# Patient Record
Sex: FEMALE | Race: WHITE
[De-identification: names, ages, dates, MRNs, and addresses within clinical notes are randomized per-mention and may not be internally consistent; named-entity substitution may affect disease eponyms.]

---

## 2017-05-31 ENCOUNTER — RECORD ABSTRACTING (OUTPATIENT)
Age: 82
End: 2017-05-31

## 2017-05-31 DIAGNOSIS — Z63.4 DISAPPEARANCE AND DEATH OF FAMILY MEMBER: ICD-10-CM

## 2017-05-31 DIAGNOSIS — K21.9 GASTRO-ESOPHAGEAL REFLUX DISEASE W/OUT ESOPHAGITIS: ICD-10-CM

## 2017-05-31 DIAGNOSIS — Z72.0 TOBACCO USE: ICD-10-CM

## 2017-05-31 DIAGNOSIS — Z82.49 FAMILY HISTORY OF ISCHEMIC HEART DISEASE AND OTHER DISEASES OF THE CIRCULATORY SYSTEM: ICD-10-CM

## 2017-05-31 DIAGNOSIS — Z72.3 LACK OF PHYSICAL EXERCISE: ICD-10-CM

## 2017-05-31 DIAGNOSIS — Z82.5 FAMILY HISTORY OF ASTHMA AND OTHER CHRONIC LOWER RESPIRATORY DISEASES: ICD-10-CM

## 2017-05-31 DIAGNOSIS — Z87.891 PERSONAL HISTORY OF NICOTINE DEPENDENCE: ICD-10-CM

## 2017-05-31 SDOH — SOCIAL STABILITY - SOCIAL INSECURITY: DISSAPEARANCE AND DEATH OF FAMILY MEMBER: Z63.4

## 2017-06-01 ENCOUNTER — APPOINTMENT (OUTPATIENT)
Dept: INTERNAL MEDICINE | Facility: CLINIC | Age: 82
End: 2017-06-01

## 2017-06-01 VITALS
OXYGEN SATURATION: 93 % | WEIGHT: 155 LBS | DIASTOLIC BLOOD PRESSURE: 56 MMHG | TEMPERATURE: 97.6 F | RESPIRATION RATE: 16 BRPM | HEIGHT: 58 IN | BODY MASS INDEX: 32.54 KG/M2 | HEART RATE: 66 BPM | SYSTOLIC BLOOD PRESSURE: 108 MMHG

## 2017-06-01 RX ORDER — SIMVASTATIN 20 MG/1
20 TABLET, FILM COATED ORAL
Qty: 90 | Refills: 0 | Status: ACTIVE | COMMUNITY
Start: 2016-04-21

## 2017-06-01 RX ORDER — CARVEDILOL 6.25 MG/1
6.25 TABLET, FILM COATED ORAL
Qty: 180 | Refills: 0 | Status: ACTIVE | COMMUNITY
Start: 2016-04-21

## 2017-06-01 RX ORDER — FAMOTIDINE 40 MG/1
40 TABLET, FILM COATED ORAL
Qty: 90 | Refills: 0 | Status: ACTIVE | COMMUNITY
Start: 2016-10-19

## 2017-12-04 ENCOUNTER — APPOINTMENT (OUTPATIENT)
Dept: INTERNAL MEDICINE | Facility: CLINIC | Age: 82
End: 2017-12-04
Payer: MEDICARE

## 2017-12-04 ENCOUNTER — NON-APPOINTMENT (OUTPATIENT)
Age: 82
End: 2017-12-04

## 2017-12-04 VITALS
SYSTOLIC BLOOD PRESSURE: 120 MMHG | BODY MASS INDEX: 32.53 KG/M2 | RESPIRATION RATE: 16 BRPM | OXYGEN SATURATION: 94 % | TEMPERATURE: 97.7 F | HEIGHT: 58 IN | DIASTOLIC BLOOD PRESSURE: 70 MMHG | WEIGHT: 154.98 LBS | HEART RATE: 78 BPM

## 2017-12-04 DIAGNOSIS — E78.2 MIXED HYPERLIPIDEMIA: ICD-10-CM

## 2017-12-04 PROCEDURE — 94060 EVALUATION OF WHEEZING: CPT

## 2017-12-04 PROCEDURE — 99214 OFFICE O/P EST MOD 30 MIN: CPT | Mod: 25

## 2018-04-09 ENCOUNTER — EMERGENCY (EMERGENCY)
Facility: HOSPITAL | Age: 83
LOS: 0 days | Discharge: HOME | End: 2018-04-09
Attending: EMERGENCY MEDICINE | Admitting: EMERGENCY MEDICINE
Payer: MEDICARE

## 2018-04-09 VITALS
TEMPERATURE: 98 F | OXYGEN SATURATION: 99 % | RESPIRATION RATE: 17 BRPM | DIASTOLIC BLOOD PRESSURE: 76 MMHG | SYSTOLIC BLOOD PRESSURE: 142 MMHG | HEART RATE: 71 BPM

## 2018-04-09 VITALS — WEIGHT: 111.99 LBS | HEIGHT: 61 IN

## 2018-04-09 DIAGNOSIS — I10 ESSENTIAL (PRIMARY) HYPERTENSION: ICD-10-CM

## 2018-04-09 DIAGNOSIS — N39.0 URINARY TRACT INFECTION, SITE NOT SPECIFIED: ICD-10-CM

## 2018-04-09 DIAGNOSIS — R10.32 LEFT LOWER QUADRANT PAIN: ICD-10-CM

## 2018-04-09 DIAGNOSIS — R11.0 NAUSEA: ICD-10-CM

## 2018-04-09 DIAGNOSIS — Z87.19 PERSONAL HISTORY OF OTHER DISEASES OF THE DIGESTIVE SYSTEM: ICD-10-CM

## 2018-04-09 DIAGNOSIS — K59.00 CONSTIPATION, UNSPECIFIED: ICD-10-CM

## 2018-04-09 LAB
ALBUMIN SERPL ELPH-MCNC: 3.4 G/DL — SIGNIFICANT CHANGE UP (ref 3.3–5)
ALP SERPL-CCNC: 55 U/L — SIGNIFICANT CHANGE UP (ref 40–120)
ALT FLD-CCNC: 40 U/L — SIGNIFICANT CHANGE UP (ref 12–78)
ANION GAP SERPL CALC-SCNC: 7 MMOL/L — SIGNIFICANT CHANGE UP (ref 5–17)
APPEARANCE UR: CLEAR — SIGNIFICANT CHANGE UP
AST SERPL-CCNC: 34 U/L — SIGNIFICANT CHANGE UP (ref 15–37)
BACTERIA # UR AUTO: (no result)
BASOPHILS # BLD AUTO: 0.03 K/UL — SIGNIFICANT CHANGE UP (ref 0–0.2)
BASOPHILS NFR BLD AUTO: 0.6 % — SIGNIFICANT CHANGE UP (ref 0–2)
BILIRUB SERPL-MCNC: 0.4 MG/DL — SIGNIFICANT CHANGE UP (ref 0.2–1.2)
BILIRUB UR-MCNC: NEGATIVE — SIGNIFICANT CHANGE UP
BUN SERPL-MCNC: 20 MG/DL — SIGNIFICANT CHANGE UP (ref 7–23)
CALCIUM SERPL-MCNC: 9.2 MG/DL — SIGNIFICANT CHANGE UP (ref 8.5–10.1)
CHLORIDE SERPL-SCNC: 103 MMOL/L — SIGNIFICANT CHANGE UP (ref 96–108)
CO2 SERPL-SCNC: 29 MMOL/L — SIGNIFICANT CHANGE UP (ref 22–31)
COLOR SPEC: YELLOW — SIGNIFICANT CHANGE UP
COMMENT - URINE: SIGNIFICANT CHANGE UP
CREAT SERPL-MCNC: 0.76 MG/DL — SIGNIFICANT CHANGE UP (ref 0.5–1.3)
DIFF PNL FLD: (no result)
EOSINOPHIL # BLD AUTO: 0.17 K/UL — SIGNIFICANT CHANGE UP (ref 0–0.5)
EOSINOPHIL NFR BLD AUTO: 3.2 % — SIGNIFICANT CHANGE UP (ref 0–6)
EPI CELLS # UR: SIGNIFICANT CHANGE UP
GLUCOSE SERPL-MCNC: 91 MG/DL — SIGNIFICANT CHANGE UP (ref 70–99)
GLUCOSE UR QL: NEGATIVE MG/DL — SIGNIFICANT CHANGE UP
HCT VFR BLD CALC: 41.7 % — SIGNIFICANT CHANGE UP (ref 34.5–45)
HGB BLD-MCNC: 14.5 G/DL — SIGNIFICANT CHANGE UP (ref 11.5–15.5)
IMM GRANULOCYTES NFR BLD AUTO: 0.2 % — SIGNIFICANT CHANGE UP (ref 0–1.5)
INR BLD: 1 RATIO — SIGNIFICANT CHANGE UP (ref 0.88–1.16)
KETONES UR-MCNC: (no result)
LEUKOCYTE ESTERASE UR-ACNC: (no result)
LIDOCAIN IGE QN: 127 U/L — SIGNIFICANT CHANGE UP (ref 73–393)
LYMPHOCYTES # BLD AUTO: 1.28 K/UL — SIGNIFICANT CHANGE UP (ref 1–3.3)
LYMPHOCYTES # BLD AUTO: 23.9 % — SIGNIFICANT CHANGE UP (ref 13–44)
MCHC RBC-ENTMCNC: 31.1 PG — SIGNIFICANT CHANGE UP (ref 27–34)
MCHC RBC-ENTMCNC: 34.8 GM/DL — SIGNIFICANT CHANGE UP (ref 32–36)
MCV RBC AUTO: 89.5 FL — SIGNIFICANT CHANGE UP (ref 80–100)
MONOCYTES # BLD AUTO: 0.63 K/UL — SIGNIFICANT CHANGE UP (ref 0–0.9)
MONOCYTES NFR BLD AUTO: 11.8 % — SIGNIFICANT CHANGE UP (ref 2–14)
NEUTROPHILS # BLD AUTO: 3.23 K/UL — SIGNIFICANT CHANGE UP (ref 1.8–7.4)
NEUTROPHILS NFR BLD AUTO: 60.3 % — SIGNIFICANT CHANGE UP (ref 43–77)
NITRITE UR-MCNC: NEGATIVE — SIGNIFICANT CHANGE UP
NRBC # BLD: 0 /100 WBCS — SIGNIFICANT CHANGE UP (ref 0–0)
PH UR: 5 — SIGNIFICANT CHANGE UP (ref 5–8)
PLATELET # BLD AUTO: 210 K/UL — SIGNIFICANT CHANGE UP (ref 150–400)
POTASSIUM SERPL-MCNC: 4.1 MMOL/L — SIGNIFICANT CHANGE UP (ref 3.5–5.3)
POTASSIUM SERPL-SCNC: 4.1 MMOL/L — SIGNIFICANT CHANGE UP (ref 3.5–5.3)
PROT SERPL-MCNC: 7.3 GM/DL — SIGNIFICANT CHANGE UP (ref 6–8.3)
PROT UR-MCNC: 15 MG/DL
PROTHROM AB SERPL-ACNC: 10.8 SEC — SIGNIFICANT CHANGE UP (ref 9.8–12.7)
RBC # BLD: 4.66 M/UL — SIGNIFICANT CHANGE UP (ref 3.8–5.2)
RBC # FLD: 13.1 % — SIGNIFICANT CHANGE UP (ref 10.3–14.5)
RBC CASTS # UR COMP ASSIST: (no result) /HPF (ref 0–4)
SODIUM SERPL-SCNC: 139 MMOL/L — SIGNIFICANT CHANGE UP (ref 135–145)
SP GR SPEC: 1.02 — SIGNIFICANT CHANGE UP (ref 1.01–1.02)
UROBILINOGEN FLD QL: 1 MG/DL
WBC # BLD: 5.35 K/UL — SIGNIFICANT CHANGE UP (ref 3.8–10.5)
WBC # FLD AUTO: 5.35 K/UL — SIGNIFICANT CHANGE UP (ref 3.8–10.5)
WBC UR QL: SIGNIFICANT CHANGE UP

## 2018-04-09 PROCEDURE — 99284 EMERGENCY DEPT VISIT MOD MDM: CPT

## 2018-04-09 PROCEDURE — 74177 CT ABD & PELVIS W/CONTRAST: CPT | Mod: 26

## 2018-04-09 RX ORDER — CEPHALEXIN 500 MG
1 CAPSULE ORAL
Qty: 14 | Refills: 0 | OUTPATIENT
Start: 2018-04-09 | End: 2018-04-15

## 2018-04-09 RX ORDER — CEPHALEXIN 500 MG
500 CAPSULE ORAL ONCE
Qty: 0 | Refills: 0 | Status: COMPLETED | OUTPATIENT
Start: 2018-04-09 | End: 2018-04-09

## 2018-04-09 RX ORDER — SODIUM CHLORIDE 9 MG/ML
1000 INJECTION INTRAMUSCULAR; INTRAVENOUS; SUBCUTANEOUS ONCE
Qty: 0 | Refills: 0 | Status: COMPLETED | OUTPATIENT
Start: 2018-04-09 | End: 2018-04-09

## 2018-04-09 RX ADMIN — SODIUM CHLORIDE 1000 MILLILITER(S): 9 INJECTION INTRAMUSCULAR; INTRAVENOUS; SUBCUTANEOUS at 18:11

## 2018-04-09 RX ADMIN — Medication 500 MILLIGRAM(S): at 19:18

## 2018-04-09 NOTE — ED ADULT NURSE NOTE - OBJECTIVE STATEMENT
Pt reports LLQ abd pain. Denies diarrhea or vomiting, but reports nausea and inability to tolerate food. Pt reports hx of diverticulitis in past. Denies any bloody bm. Denies hematuria or dysuria.

## 2018-04-09 NOTE — ED STATDOCS - OBJECTIVE STATEMENT
87 y/o female with PMHx of diverticulitis, s/p appy, s/p hysterectomy, HTN, unknown cardiac hx presents to the ED c/o LLQ abd pain x5 days. Pain only occurs upon palpitation. +constipation. +nausea. +chills. No fever, vomiting. Last BM was yesterday. Colonoscopy by Dr. Bai few years ago, pt states that "there was blockage." Sent into ED for further workup by PCP Dr. Soto. Cardio/Dr. Mejia.

## 2018-04-09 NOTE — ED ADULT TRIAGE NOTE - CHIEF COMPLAINT QUOTE
abdominal cramping/discomfort x5 days. +constipation. denies n/v/d. referred from primary care physician for further work up.

## 2018-04-09 NOTE — ED STATDOCS - PROGRESS NOTE DETAILS
Patient seen and evaluated, ED attending note and orders reviewed, will continue with patient follow up and care -Rajinder Rivero PA-C Reviewed all lab and CT results with patient.  No acute findings on CT.  Pain likely due to constipation from dehydration as apparent in ketones in urine.  Patient got some IV hydration, there is also some blood a leukocytes in urine, will treat for UTI and she will follow up outpatient -Rajinder Rivero PA-C

## 2018-04-09 NOTE — ED STATDOCS - CARE PLAN
Principal Discharge DX:	Left lower quadrant abdominal pain of unknown etiology  Secondary Diagnosis:	Urinary tract infection

## 2018-06-20 ENCOUNTER — CHART COPY (OUTPATIENT)
Age: 83
End: 2018-06-20

## 2018-06-27 ENCOUNTER — OTHER (OUTPATIENT)
Age: 83
End: 2018-06-27

## 2018-07-12 ENCOUNTER — APPOINTMENT (OUTPATIENT)
Dept: INTERNAL MEDICINE | Facility: CLINIC | Age: 83
End: 2018-07-12
Payer: MEDICARE

## 2018-07-12 ENCOUNTER — NON-APPOINTMENT (OUTPATIENT)
Age: 83
End: 2018-07-12

## 2018-07-12 VITALS
OXYGEN SATURATION: 96 % | TEMPERATURE: 98.3 F | HEART RATE: 72 BPM | WEIGHT: 156 LBS | RESPIRATION RATE: 16 BRPM | HEIGHT: 58 IN | DIASTOLIC BLOOD PRESSURE: 62 MMHG | BODY MASS INDEX: 32.75 KG/M2 | SYSTOLIC BLOOD PRESSURE: 110 MMHG

## 2018-07-12 PROCEDURE — 94060 EVALUATION OF WHEEZING: CPT

## 2018-07-12 PROCEDURE — 99214 OFFICE O/P EST MOD 30 MIN: CPT | Mod: 25

## 2018-07-12 RX ORDER — MONTELUKAST 10 MG/1
10 TABLET, FILM COATED ORAL
Qty: 90 | Refills: 2 | Status: DISCONTINUED | COMMUNITY
Start: 2017-12-04 | End: 2018-07-12

## 2018-07-12 RX ORDER — FLUTICASONE PROPIONATE AND SALMETEROL 50; 250 UG/1; UG/1
250-50 POWDER RESPIRATORY (INHALATION) TWICE DAILY
Qty: 3 | Refills: 3 | Status: DISCONTINUED | COMMUNITY
Start: 2017-12-04 | End: 2018-07-12

## 2018-09-14 ENCOUNTER — EMERGENCY (EMERGENCY)
Facility: HOSPITAL | Age: 83
LOS: 0 days | Discharge: HOME | End: 2018-09-14
Attending: EMERGENCY MEDICINE | Admitting: EMERGENCY MEDICINE
Payer: MEDICARE

## 2018-09-14 VITALS
DIASTOLIC BLOOD PRESSURE: 79 MMHG | TEMPERATURE: 98 F | HEART RATE: 83 BPM | WEIGHT: 169.98 LBS | SYSTOLIC BLOOD PRESSURE: 166 MMHG | RESPIRATION RATE: 18 BRPM | OXYGEN SATURATION: 97 % | HEIGHT: 60 IN

## 2018-09-14 VITALS
SYSTOLIC BLOOD PRESSURE: 133 MMHG | DIASTOLIC BLOOD PRESSURE: 71 MMHG | HEART RATE: 80 BPM | RESPIRATION RATE: 18 BRPM | OXYGEN SATURATION: 95 %
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
## 2018-09-14 DIAGNOSIS — S81.811A LACERATION WITHOUT FOREIGN BODY, RIGHT LOWER LEG, INITIAL ENCOUNTER: ICD-10-CM
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
## 2018-09-14 DIAGNOSIS — I10 ESSENTIAL (PRIMARY) HYPERTENSION: ICD-10-CM

## 2018-09-14 DIAGNOSIS — Z79.899 OTHER LONG TERM (CURRENT) DRUG THERAPY: ICD-10-CM

## 2018-09-14 PROBLEM — K57.92 DIVERTICULITIS OF INTESTINE, PART UNSPECIFIED, WITHOUT PERFORATION OR ABSCESS WITHOUT BLEEDING: Chronic | Status: ACTIVE | Noted: 2018-04-09

## 2018-09-14 PROCEDURE — 99284 EMERGENCY DEPT VISIT MOD MDM: CPT

## 2018-09-14 PROCEDURE — 12004 RPR S/N/AX/GEN/TRK7.6-12.5CM: CPT | Mod: RT

## 2018-09-14 RX ORDER — OXYCODONE HYDROCHLORIDE 5 MG/1
1 TABLET ORAL
Qty: 12 | Refills: 0 | OUTPATIENT
Start: 2018-09-14 | End: 2018-09-16

## 2018-09-14 RX ORDER — TETANUS TOXOID, REDUCED DIPHTHERIA TOXOID AND ACELLULAR PERTUSSIS VACCINE, ADSORBED 5; 2.5; 8; 8; 2.5 [IU]/.5ML; [IU]/.5ML; UG/.5ML; UG/.5ML; UG/.5ML
0.5 SUSPENSION INTRAMUSCULAR ONCE
Qty: 0 | Refills: 0 | Status: COMPLETED | OUTPATIENT
Start: 2018-09-14 | End: 2018-09-14

## 2018-09-14 RX ORDER — CEPHALEXIN 500 MG
1 CAPSULE ORAL
Qty: 21 | Refills: 0 | OUTPATIENT
Start: 2018-09-14 | End: 2018-09-20

## 2018-09-14 RX ORDER — ONDANSETRON 8 MG/1
4 TABLET, FILM COATED ORAL ONCE
Qty: 0 | Refills: 0 | Status: COMPLETED | OUTPATIENT
Start: 2018-09-14 | End: 2018-09-14

## 2018-09-14 RX ORDER — ONDANSETRON 8 MG/1
4 TABLET, FILM COATED ORAL ONCE
Qty: 0 | Refills: 0 | Status: DISCONTINUED | OUTPATIENT
Start: 2018-09-14 | End: 2018-09-14

## 2018-09-14 RX ORDER — OXYCODONE AND ACETAMINOPHEN 5; 325 MG/1; MG/1
2 TABLET ORAL ONCE
Qty: 0 | Refills: 0 | Status: DISCONTINUED | OUTPATIENT
Start: 2018-09-14 | End: 2018-09-14

## 2018-09-14 RX ADMIN — TETANUS TOXOID, REDUCED DIPHTHERIA TOXOID AND ACELLULAR PERTUSSIS VACCINE, ADSORBED 0.5 MILLILITER(S): 5; 2.5; 8; 8; 2.5 SUSPENSION INTRAMUSCULAR at 12:09

## 2018-09-14 RX ADMIN — OXYCODONE AND ACETAMINOPHEN 2 TABLET(S): 5; 325 TABLET ORAL at 12:09

## 2018-09-14 RX ADMIN — ONDANSETRON 4 MILLIGRAM(S): 8 TABLET, FILM COATED ORAL at 12:09

## 2018-09-14 NOTE — ED ADULT NURSE NOTE - NSIMPLEMENTINTERV_GEN_ALL_ED
Implemented All Fall with Harm Risk Interventions:  [place] to call system. Call bell, personal items and telephone within reach. Instruct patient to call for assistance. Room bathroom lighting operational. Non-slip footwear when patient is off stretcher. Physically safe environment: no spills, clutter or unnecessary equipment. Stretcher in lowest position, wheels locked, appropriate side rails in place. Provide visual cue, wrist band, yellow gown, etc. Monitor gait and stability. Monitor for mental status changes and reorient to person, place, and time. Review medications for side effects contributing to fall risk. Reinforce activity limits and safety measures with patient and family. Provide visual clues: red socks.

## 2018-09-14 NOTE — ED PROVIDER NOTE - PROGRESS NOTE DETAILS
Wound cleansed with copious NS , laceration repaired. ice to area x 24 hours; dressing to remain in place x 24 hours; then wound is to be cleaned with soap and water, dried well and rebandaged, after 48 hours patient can get wound wet in the shower, but sutures can never be drenched, they need to be completely dried after getting them wet; keep wound dry and clean, during daily activities try and be mindful that sutures are in place so that you do not reopen wound, wound should be checked in 2 days by your primary care doctor or return to the clinic and suture removal should be done in 8-12 days by your regular doctor or you may return to ED for suture removal; if any significant redness, swelling, purulent drainage, or any severe increase in pain or if fever occurs, please return to ED immediately. if there are any other changes or worsening of symptoms patient is to return to ED. -Radames Mohan PA-C

## 2018-09-14 NOTE — ED PROVIDER NOTE - CARDIAC, MLM
Normal rate, regular rhythm.  Heart sounds S1, S2.  No murmurs, rubs or gallops. +2 dorsalis pedis pulse.

## 2018-09-14 NOTE — ED ADULT TRIAGE NOTE - CHIEF COMPLAINT QUOTE
Patient comes to ED from MD office, pt was trying to get onto exam table, slipped and hit right shin causing laceration. no fall

## 2018-09-14 NOTE — ED PROVIDER NOTE - SKIN, MLM
Skin normal color for race, warm, dry and intact. No evidence of rash. 8 cm V shaped laceration. No active bleeding.

## 2018-09-14 NOTE — ED PROVIDER NOTE - OBJECTIVE STATEMENT
89 y/o female with a PMHx of diverticulitis, HTN presents to the ED c/o lacerations. Pt was at MD office, got up to sit on exam table, slipped, and fell. Pt hit right shin. +laceration on right shin. No other complaints at this time.

## 2018-09-14 NOTE — ED PROVIDER NOTE - ATTENDING CONTRIBUTION TO CARE
Attending Contribution to Care: I, Candi Flores, performed the initial face to face bedside interview with this patient regarding history of present illness, review of symptoms and relevant past medical, social and family history.  I completed an independent physical examination.  I was the initial provider who evaluated this patient and the history, physical, and MDM reflect this intial assessment. I have signed out the follow up of any pending tests after the original (i.e. labs, radiological studies) to the ACP with instructions to review any with instructions to review any concerning findings to me prior to discharge.  I have communicated the patient’s plan of care and disposition with the ACP.

## 2018-09-14 NOTE — ED ADULT NURSE NOTE - OBJECTIVE STATEMENT
Pt states she was at MD office when she banged right shin against exam table - bleeding controlled with dressing prior to arrival. Pt denies any other injuries.

## 2018-11-20 ENCOUNTER — INPATIENT (INPATIENT)
Facility: HOSPITAL | Age: 83
LOS: 4 days | Discharge: HOME | End: 2018-11-25
Attending: FAMILY MEDICINE | Admitting: FAMILY MEDICINE
Payer: MEDICARE

## 2018-11-20 VITALS — HEIGHT: 59 IN | WEIGHT: 149.91 LBS

## 2018-11-20 DIAGNOSIS — R09.89 OTHER SPECIFIED SYMPTOMS AND SIGNS INVOLVING THE CIRCULATORY AND RESPIRATORY SYSTEMS: ICD-10-CM

## 2018-11-20 DIAGNOSIS — Z90.89 ACQUIRED ABSENCE OF OTHER ORGANS: Chronic | ICD-10-CM

## 2018-11-20 DIAGNOSIS — Z90.49 ACQUIRED ABSENCE OF OTHER SPECIFIED PARTS OF DIGESTIVE TRACT: Chronic | ICD-10-CM

## 2018-11-20 LAB
ADD ON TEST-SPECIMEN IN LAB: SIGNIFICANT CHANGE UP
ALBUMIN SERPL ELPH-MCNC: 3.6 G/DL — SIGNIFICANT CHANGE UP (ref 3.3–5)
ALP SERPL-CCNC: 49 U/L — SIGNIFICANT CHANGE UP (ref 40–120)
ALT FLD-CCNC: 54 U/L — SIGNIFICANT CHANGE UP (ref 12–78)
ANION GAP SERPL CALC-SCNC: 7 MMOL/L — SIGNIFICANT CHANGE UP (ref 5–17)
AST SERPL-CCNC: 28 U/L — SIGNIFICANT CHANGE UP (ref 15–37)
BILIRUB SERPL-MCNC: 0.5 MG/DL — SIGNIFICANT CHANGE UP (ref 0.2–1.2)
BUN SERPL-MCNC: 32 MG/DL — HIGH (ref 7–23)
CALCIUM SERPL-MCNC: 8.9 MG/DL — SIGNIFICANT CHANGE UP (ref 8.5–10.1)
CHLORIDE SERPL-SCNC: 105 MMOL/L — SIGNIFICANT CHANGE UP (ref 96–108)
CO2 SERPL-SCNC: 29 MMOL/L — SIGNIFICANT CHANGE UP (ref 22–31)
CREAT SERPL-MCNC: 0.68 MG/DL — SIGNIFICANT CHANGE UP (ref 0.5–1.3)
ERYTHROCYTE [SEDIMENTATION RATE] IN BLOOD: 12 MM/HR — SIGNIFICANT CHANGE UP (ref 0–20)
GLUCOSE SERPL-MCNC: 92 MG/DL — SIGNIFICANT CHANGE UP (ref 70–99)
HCT VFR BLD CALC: 43.5 % — SIGNIFICANT CHANGE UP (ref 34.5–45)
HGB BLD-MCNC: 14.6 G/DL — SIGNIFICANT CHANGE UP (ref 11.5–15.5)
MCHC RBC-ENTMCNC: 31.9 PG — SIGNIFICANT CHANGE UP (ref 27–34)
MCHC RBC-ENTMCNC: 33.6 GM/DL — SIGNIFICANT CHANGE UP (ref 32–36)
MCV RBC AUTO: 95 FL — SIGNIFICANT CHANGE UP (ref 80–100)
NRBC # BLD: 0 /100 WBCS — SIGNIFICANT CHANGE UP (ref 0–0)
PLATELET # BLD AUTO: 221 K/UL — SIGNIFICANT CHANGE UP (ref 150–400)
POTASSIUM SERPL-MCNC: 4.7 MMOL/L — SIGNIFICANT CHANGE UP (ref 3.5–5.3)
POTASSIUM SERPL-SCNC: 4.7 MMOL/L — SIGNIFICANT CHANGE UP (ref 3.5–5.3)
PROT SERPL-MCNC: 7.3 GM/DL — SIGNIFICANT CHANGE UP (ref 6–8.3)
RBC # BLD: 4.58 M/UL — SIGNIFICANT CHANGE UP (ref 3.8–5.2)
RBC # FLD: 13 % — SIGNIFICANT CHANGE UP (ref 10.3–14.5)
SODIUM SERPL-SCNC: 141 MMOL/L — SIGNIFICANT CHANGE UP (ref 135–145)
WBC # BLD: 13.1 K/UL — HIGH (ref 3.8–10.5)
WBC # FLD AUTO: 13.1 K/UL — HIGH (ref 3.8–10.5)

## 2018-11-20 PROCEDURE — 73590 X-RAY EXAM OF LOWER LEG: CPT | Mod: 26,RT

## 2018-11-20 PROCEDURE — 93971 EXTREMITY STUDY: CPT | Mod: 26,RT

## 2018-11-20 PROCEDURE — 93010 ELECTROCARDIOGRAM REPORT: CPT

## 2018-11-20 PROCEDURE — 71045 X-RAY EXAM CHEST 1 VIEW: CPT | Mod: 26

## 2018-11-20 PROCEDURE — 99285 EMERGENCY DEPT VISIT HI MDM: CPT

## 2018-11-20 RX ORDER — PANTOPRAZOLE SODIUM 20 MG/1
40 TABLET, DELAYED RELEASE ORAL
Qty: 0 | Refills: 0 | Status: DISCONTINUED | OUTPATIENT
Start: 2018-11-20 | End: 2018-11-25

## 2018-11-20 RX ORDER — SODIUM CHLORIDE 9 MG/ML
1000 INJECTION INTRAMUSCULAR; INTRAVENOUS; SUBCUTANEOUS
Qty: 0 | Refills: 0 | Status: DISCONTINUED | OUTPATIENT
Start: 2018-11-20 | End: 2018-11-22

## 2018-11-20 RX ORDER — CEFTRIAXONE 500 MG/1
1 INJECTION, POWDER, FOR SOLUTION INTRAMUSCULAR; INTRAVENOUS EVERY 24 HOURS
Qty: 0 | Refills: 0 | Status: DISCONTINUED | OUTPATIENT
Start: 2018-11-21 | End: 2018-11-22

## 2018-11-20 RX ORDER — PYRIDOXINE HCL (VITAMIN B6) 100 MG
100 TABLET ORAL DAILY
Qty: 0 | Refills: 0 | Status: DISCONTINUED | OUTPATIENT
Start: 2018-11-20 | End: 2018-11-25

## 2018-11-20 RX ORDER — ACETAMINOPHEN 500 MG
650 TABLET ORAL EVERY 8 HOURS
Qty: 0 | Refills: 0 | Status: DISCONTINUED | OUTPATIENT
Start: 2018-11-20 | End: 2018-11-25

## 2018-11-20 RX ORDER — ONDANSETRON 8 MG/1
4 TABLET, FILM COATED ORAL EVERY 6 HOURS
Qty: 0 | Refills: 0 | Status: DISCONTINUED | OUTPATIENT
Start: 2018-11-20 | End: 2018-11-25

## 2018-11-20 RX ORDER — CEFTRIAXONE 500 MG/1
1 INJECTION, POWDER, FOR SOLUTION INTRAMUSCULAR; INTRAVENOUS ONCE
Qty: 0 | Refills: 0 | Status: COMPLETED | OUTPATIENT
Start: 2018-11-20 | End: 2018-11-20

## 2018-11-20 RX ORDER — LACTOBACILLUS ACIDOPHILUS 100MM CELL
1 CAPSULE ORAL DAILY
Qty: 0 | Refills: 0 | Status: DISCONTINUED | OUTPATIENT
Start: 2018-11-20 | End: 2018-11-25

## 2018-11-20 RX ORDER — OXYBUTYNIN CHLORIDE 5 MG
5 TABLET ORAL
Qty: 0 | Refills: 0 | Status: DISCONTINUED | OUTPATIENT
Start: 2018-11-20 | End: 2018-11-25

## 2018-11-20 RX ORDER — CARVEDILOL PHOSPHATE 80 MG/1
6.25 CAPSULE, EXTENDED RELEASE ORAL EVERY 12 HOURS
Qty: 0 | Refills: 0 | Status: DISCONTINUED | OUTPATIENT
Start: 2018-11-20 | End: 2018-11-25

## 2018-11-20 RX ORDER — CEFTRIAXONE 500 MG/1
INJECTION, POWDER, FOR SOLUTION INTRAMUSCULAR; INTRAVENOUS
Qty: 0 | Refills: 0 | Status: DISCONTINUED | OUTPATIENT
Start: 2018-11-20 | End: 2018-11-22

## 2018-11-20 RX ORDER — DONEPEZIL HYDROCHLORIDE 10 MG/1
10 TABLET, FILM COATED ORAL DAILY
Qty: 0 | Refills: 0 | Status: DISCONTINUED | OUTPATIENT
Start: 2018-11-20 | End: 2018-11-25

## 2018-11-20 RX ORDER — ENOXAPARIN SODIUM 100 MG/ML
40 INJECTION SUBCUTANEOUS EVERY 24 HOURS
Qty: 0 | Refills: 0 | Status: DISCONTINUED | OUTPATIENT
Start: 2018-11-20 | End: 2018-11-25

## 2018-11-20 RX ORDER — ACETAMINOPHEN 500 MG
1000 TABLET ORAL ONCE
Qty: 0 | Refills: 0 | Status: COMPLETED | OUTPATIENT
Start: 2018-11-20 | End: 2018-11-20

## 2018-11-20 RX ORDER — VANCOMYCIN HCL 1 G
1000 VIAL (EA) INTRAVENOUS ONCE
Qty: 0 | Refills: 0 | Status: COMPLETED | OUTPATIENT
Start: 2018-11-20 | End: 2018-11-20

## 2018-11-20 RX ORDER — SIMVASTATIN 20 MG/1
20 TABLET, FILM COATED ORAL AT BEDTIME
Qty: 0 | Refills: 0 | Status: DISCONTINUED | OUTPATIENT
Start: 2018-11-20 | End: 2018-11-25

## 2018-11-20 RX ORDER — FAMOTIDINE 10 MG/ML
40 INJECTION INTRAVENOUS AT BEDTIME
Qty: 0 | Refills: 0 | Status: DISCONTINUED | OUTPATIENT
Start: 2018-11-20 | End: 2018-11-25

## 2018-11-20 RX ORDER — CHOLECALCIFEROL (VITAMIN D3) 125 MCG
400 CAPSULE ORAL DAILY
Qty: 0 | Refills: 0 | Status: DISCONTINUED | OUTPATIENT
Start: 2018-11-20 | End: 2018-11-25

## 2018-11-20 RX ADMIN — Medication 650 MILLIGRAM(S): at 23:08

## 2018-11-20 RX ADMIN — Medication 1000 MILLIGRAM(S): at 17:30

## 2018-11-20 RX ADMIN — Medication 600 MILLIGRAM(S): at 17:20

## 2018-11-20 RX ADMIN — Medication 1000 MILLIGRAM(S): at 17:00

## 2018-11-20 RX ADMIN — CEFTRIAXONE 100 GRAM(S): 500 INJECTION, POWDER, FOR SOLUTION INTRAMUSCULAR; INTRAVENOUS at 21:00

## 2018-11-20 RX ADMIN — SIMVASTATIN 20 MILLIGRAM(S): 20 TABLET, FILM COATED ORAL at 23:10

## 2018-11-20 RX ADMIN — Medication 100 MILLIGRAM(S): at 16:50

## 2018-11-20 RX ADMIN — FAMOTIDINE 40 MILLIGRAM(S): 10 INJECTION INTRAVENOUS at 23:11

## 2018-11-20 RX ADMIN — ENOXAPARIN SODIUM 40 MILLIGRAM(S): 100 INJECTION SUBCUTANEOUS at 23:11

## 2018-11-20 RX ADMIN — Medication 250 MILLIGRAM(S): at 23:51

## 2018-11-20 NOTE — ED STATDOCS - SKIN, MLM
skin normal color for race, warm, dry and intact. RLE minimally erythematous, blanching, neuro intact. No vesicles. Oozing minimal purulent drainage.

## 2018-11-20 NOTE — H&P ADULT - NSHPPHYSICALEXAM_GEN_ALL_CORE
PHYSICAL EXAM:    Constitutional: NAD, awake and alert, well-developed  HEENT: PERR, EOMI, Normal Hearing, MMM  Neck: Soft and supple, No LAD, No JVD  Respiratory: Breath sounds are clear bilaterally, No wheezing, rales or rhonchi  Cardiovascular: S1 and S2, regular rate and rhythm, no Murmurs, gallops or rubs  Gastrointestinal: Bowel Sounds present, soft, nontender, nondistended, no guarding, no rebound  Extremities: No peripheral edema  Vascular: 2+ peripheral pulses  Neurological: A/O x 3, no focal deficits  Musculoskeletal: 5/5 strength b/l upper and lower extremities  Skin: No rashes, RLE shin erythema with some open wound and yellow discharge  rectal : deferred, not indicated

## 2018-11-20 NOTE — H&P ADULT - ASSESSMENT
89 y/o F with PMHx of CAD, h/o right sided Lung ca s/p RT , HLD, and GERD, CBP, overactive bladder presenting to the  with 1 days history of  lower right leg pain and redness .  Pt was seen in ED in 09/2018 with laceration to right shin s/p slip and fall. 19 stitches were placed which have since been removed. Last night, pt started to experience erythema and pain to site of wound on RLE. Seen by PMD Dr. Cuevas today who advised pt to come into ED for evaluation, pt was taking abx for recently dx UTI.  No falls or other trauma to the leg since original injury in September. Denies any abd pain, nausea, vomiting, but has poor appetite, no fevers, chills, no CP, SOB, numbness, or weakness. Patient reports occasional dry cough and some lung sounds on and off in last couple of weeks.    in ED - T(F): 98, Max: 98.2 HR: 67 ( (67 - 77) BP: 134/67  (134/67 - 147/72) RR: 19 (19 - 19) SpO2: 100%  (100% - 100%)WBC 13, BUN 32, Cr 0.68 EKG - sinus , RBBB, no ischemic changes, CXR - no infiltrates, s/p Clindamycin in ED     * RLE erythema and pain suspected acute cellulitis r/o DVT  - med-surg  - X ray - no fx  - doppler - pending  - IV fluids  - s/p Clinda in ED, c/w IV Ceftriaxone and Vanco  - f/u cultures , check UA, urine cx  - ID consult  - local wound care  - elevation  - trend WBC    * cough h/o lung CA s/p RT  - CXR - clear  - follows with Dr. Giron for serial CT , next schedule for december    * HTN  - c/w coreg    * HLD   - c/w statins    * overactive bladder, recent UTI  - c/w oxybutinin  - f/u UA, urine cx  - ceftriaxone will cover UTI     * Dementia  - c/w aricept    * GERD  - c/w PPI and h2 blocker    Advanced directives  - spend 15 min  - FULL code    DVT proph - lovenox  IMPROVE VTE Individual Risk Assessment    RISK                                                                Points    [  ] Previous VTE                                                  3    [  ] Thrombophilia                                               2    [  ] Lower limb paralysis                                      2        (unable to hold up >15 seconds)      [ x ] Current Cancer                                              2         (within 6 months)    [  ] Immobilization > 24 hrs                                1    [  ] ICU/CCU stay > 24 hours                              1    [ x ] Age > 60                                                      1    IMPROVE VTE Score _____3___    Time spend 72 minutes

## 2018-11-20 NOTE — ED STATDOCS - PROGRESS NOTE DETAILS
[id number]  wound care at [place] MARIAH Messer:   Patient has been seen, evaluated and orders have been written by the attending in intake. Patient is stable.  I will follow up the results of orders written and I will continue to evaluate/observe the patient.  Pt. to be DC with Clindamycin and wound care will contact her at home as per attending.  Line of demarkation drawn to site.  Olga Messer PA-C Dr. Wellington consulted and wants patient admitted cellulitis vs vasculitis.  Pt. agreeable.  Olga Messer PA-C

## 2018-11-20 NOTE — ED STATDOCS - MEDICAL DECISION MAKING DETAILS
89 y/o F with RLE mild cellulitis, no fevers or chills, otherwise feels well, sent in by PMD. Will obtain blood work, give IV Abx, and discuss with plastics or wound care.

## 2018-11-20 NOTE — H&P ADULT - NSHPREVIEWOFSYSTEMS_GEN_ALL_CORE
REVIEW OF SYSTEMS:    CONSTITUTIONAL: No weakness, fevers or chills  EYES/ENT: No visual changes;  No vertigo or throat pain   NECK: No pain or stiffness  RESPIRATORY: No cough, wheezing, hemoptysis; No shortness of breath  CARDIOVASCULAR: No chest pain or palpitations  GASTROINTESTINAL: No abdominal or epigastric pain. No nausea, vomiting, or hematemesis; No diarrhea or constipation. No melena or hematochezia.  GENITOURINARY: No dysuria, frequency or hematuria  NEUROLOGICAL: No numbness or weakness  SKIN: No itching, burning, rashes, right lower leg redness and pain   All other review of systems is negative unless indicated above.

## 2018-11-20 NOTE — ED STATDOCS - OBJECTIVE STATEMENT
89 y/o F with PMHx of CAD, HLD, and GERD presenting to the ED c/o lower right leg pain and erythema starting last night. Pt was seen in ED in 09/2018 with laceration to right shin s/p slip and fall. 19 stitches were placed which have since been removed. Last night, pt started to experience erythema and pain to site of wound on RLE. Seen by PMD Dr. Cuevas today who advised pt to come into ED for evaluation. No falls or other trauma to the leg since original injury in September. Denies any abd pain, N/V/D, fevers, chills, CP, SOB, numbness, or weakness. Pt has been in compliance with an Abx for the past few days to treat UTI. Pt unsure name of Abx but believes it may be Macrobid. Allergic to Lotrel and Sulfa.

## 2018-11-20 NOTE — H&P ADULT - PMH
CAD (coronary artery disease)    Chronic back pain    Diverticulitis    GERD (gastroesophageal reflux disease)    HTN (hypertension)    Lung cancer    Overactive bladder

## 2018-11-20 NOTE — ED STATDOCS - ATTENDING CONTRIBUTION TO CARE
I, Shayy Morales MD, personally saw the patient with ACP.  I have personally performed a face to face diagnostic evaluation on this patient.  I have reviewed the ACP note and agree with the history, exam, and plan of care, except as noted.

## 2018-11-21 LAB
ALBUMIN SERPL ELPH-MCNC: 2.7 G/DL — LOW (ref 3.3–5)
ALP SERPL-CCNC: 39 U/L — LOW (ref 40–120)
ALT FLD-CCNC: 39 U/L — SIGNIFICANT CHANGE UP (ref 12–78)
ANION GAP SERPL CALC-SCNC: 10 MMOL/L — SIGNIFICANT CHANGE UP (ref 5–17)
APPEARANCE UR: CLEAR — SIGNIFICANT CHANGE UP
AST SERPL-CCNC: 23 U/L — SIGNIFICANT CHANGE UP (ref 15–37)
BASOPHILS # BLD AUTO: 0.01 K/UL — SIGNIFICANT CHANGE UP (ref 0–0.2)
BASOPHILS NFR BLD AUTO: 0.1 % — SIGNIFICANT CHANGE UP (ref 0–2)
BILIRUB SERPL-MCNC: 0.5 MG/DL — SIGNIFICANT CHANGE UP (ref 0.2–1.2)
BILIRUB UR-MCNC: NEGATIVE — SIGNIFICANT CHANGE UP
BUN SERPL-MCNC: 26 MG/DL — HIGH (ref 7–23)
CALCIUM SERPL-MCNC: 8 MG/DL — LOW (ref 8.5–10.1)
CHLORIDE SERPL-SCNC: 106 MMOL/L — SIGNIFICANT CHANGE UP (ref 96–108)
CO2 SERPL-SCNC: 25 MMOL/L — SIGNIFICANT CHANGE UP (ref 22–31)
COLOR SPEC: YELLOW — SIGNIFICANT CHANGE UP
CREAT SERPL-MCNC: 0.6 MG/DL — SIGNIFICANT CHANGE UP (ref 0.5–1.3)
CRP SERPL-MCNC: <0.1 MG/DL — SIGNIFICANT CHANGE UP (ref 0–0.4)
DIFF PNL FLD: NEGATIVE — SIGNIFICANT CHANGE UP
EOSINOPHIL # BLD AUTO: 0.03 K/UL — SIGNIFICANT CHANGE UP (ref 0–0.5)
EOSINOPHIL NFR BLD AUTO: 0.3 % — SIGNIFICANT CHANGE UP (ref 0–6)
GLUCOSE SERPL-MCNC: 97 MG/DL — SIGNIFICANT CHANGE UP (ref 70–99)
GLUCOSE UR QL: NEGATIVE MG/DL — SIGNIFICANT CHANGE UP
HCT VFR BLD CALC: 36.8 % — SIGNIFICANT CHANGE UP (ref 34.5–45)
HGB BLD-MCNC: 12.4 G/DL — SIGNIFICANT CHANGE UP (ref 11.5–15.5)
IMM GRANULOCYTES NFR BLD AUTO: 0.3 % — SIGNIFICANT CHANGE UP (ref 0–1.5)
KETONES UR-MCNC: NEGATIVE — SIGNIFICANT CHANGE UP
LEUKOCYTE ESTERASE UR-ACNC: NEGATIVE — SIGNIFICANT CHANGE UP
LYMPHOCYTES # BLD AUTO: 1.74 K/UL — SIGNIFICANT CHANGE UP (ref 1–3.3)
LYMPHOCYTES # BLD AUTO: 17.3 % — SIGNIFICANT CHANGE UP (ref 13–44)
MAGNESIUM SERPL-MCNC: 2 MG/DL — SIGNIFICANT CHANGE UP (ref 1.6–2.6)
MCHC RBC-ENTMCNC: 31.3 PG — SIGNIFICANT CHANGE UP (ref 27–34)
MCHC RBC-ENTMCNC: 33.7 GM/DL — SIGNIFICANT CHANGE UP (ref 32–36)
MCV RBC AUTO: 92.9 FL — SIGNIFICANT CHANGE UP (ref 80–100)
MONOCYTES # BLD AUTO: 0.86 K/UL — SIGNIFICANT CHANGE UP (ref 0–0.9)
MONOCYTES NFR BLD AUTO: 8.5 % — SIGNIFICANT CHANGE UP (ref 2–14)
NEUTROPHILS # BLD AUTO: 7.39 K/UL — SIGNIFICANT CHANGE UP (ref 1.8–7.4)
NEUTROPHILS NFR BLD AUTO: 73.5 % — SIGNIFICANT CHANGE UP (ref 43–77)
NITRITE UR-MCNC: NEGATIVE — SIGNIFICANT CHANGE UP
NRBC # BLD: 0 /100 WBCS — SIGNIFICANT CHANGE UP (ref 0–0)
PH UR: 5 — SIGNIFICANT CHANGE UP (ref 5–8)
PHOSPHATE SERPL-MCNC: 3.1 MG/DL — SIGNIFICANT CHANGE UP (ref 2.5–4.5)
PLATELET # BLD AUTO: 179 K/UL — SIGNIFICANT CHANGE UP (ref 150–400)
POTASSIUM SERPL-MCNC: 4 MMOL/L — SIGNIFICANT CHANGE UP (ref 3.5–5.3)
POTASSIUM SERPL-SCNC: 4 MMOL/L — SIGNIFICANT CHANGE UP (ref 3.5–5.3)
PROT SERPL-MCNC: 5.9 GM/DL — LOW (ref 6–8.3)
PROT UR-MCNC: NEGATIVE MG/DL — SIGNIFICANT CHANGE UP
RBC # BLD: 3.96 M/UL — SIGNIFICANT CHANGE UP (ref 3.8–5.2)
RBC # FLD: 13 % — SIGNIFICANT CHANGE UP (ref 10.3–14.5)
SODIUM SERPL-SCNC: 141 MMOL/L — SIGNIFICANT CHANGE UP (ref 135–145)
SP GR SPEC: 1.02 — SIGNIFICANT CHANGE UP (ref 1.01–1.02)
TSH SERPL-MCNC: 1.4 UU/ML — SIGNIFICANT CHANGE UP (ref 0.34–4.82)
UROBILINOGEN FLD QL: NEGATIVE MG/DL — SIGNIFICANT CHANGE UP
WBC # BLD: 10.06 K/UL — SIGNIFICANT CHANGE UP (ref 3.8–10.5)
WBC # FLD AUTO: 10.06 K/UL — SIGNIFICANT CHANGE UP (ref 3.8–10.5)

## 2018-11-21 PROCEDURE — 73718 MRI LOWER EXTREMITY W/O DYE: CPT | Mod: 26,LT

## 2018-11-21 RX ORDER — ACETAMINOPHEN 500 MG
650 TABLET ORAL EVERY 6 HOURS
Qty: 0 | Refills: 0 | Status: DISCONTINUED | OUTPATIENT
Start: 2018-11-21 | End: 2018-11-25

## 2018-11-21 RX ORDER — METHENAMINE MANDELATE 1 G
1 TABLET ORAL
Qty: 0 | Refills: 0 | COMMUNITY

## 2018-11-21 RX ORDER — LANOLIN ALCOHOL/MO/W.PET/CERES
3 CREAM (GRAM) TOPICAL ONCE
Qty: 0 | Refills: 0 | Status: COMPLETED | OUTPATIENT
Start: 2018-11-21 | End: 2018-11-21

## 2018-11-21 RX ORDER — VANCOMYCIN HCL 1 G
750 VIAL (EA) INTRAVENOUS EVERY 12 HOURS
Qty: 0 | Refills: 0 | Status: DISCONTINUED | OUTPATIENT
Start: 2018-11-21 | End: 2018-11-25

## 2018-11-21 RX ADMIN — CARVEDILOL PHOSPHATE 6.25 MILLIGRAM(S): 80 CAPSULE, EXTENDED RELEASE ORAL at 18:27

## 2018-11-21 RX ADMIN — SIMVASTATIN 20 MILLIGRAM(S): 20 TABLET, FILM COATED ORAL at 23:24

## 2018-11-21 RX ADMIN — Medication 400 UNIT(S): at 12:14

## 2018-11-21 RX ADMIN — Medication 650 MILLIGRAM(S): at 23:55

## 2018-11-21 RX ADMIN — Medication 650 MILLIGRAM(S): at 23:25

## 2018-11-21 RX ADMIN — Medication 5 MILLIGRAM(S): at 06:44

## 2018-11-21 RX ADMIN — Medication 650 MILLIGRAM(S): at 06:47

## 2018-11-21 RX ADMIN — CARVEDILOL PHOSPHATE 6.25 MILLIGRAM(S): 80 CAPSULE, EXTENDED RELEASE ORAL at 06:44

## 2018-11-21 RX ADMIN — Medication 100 MILLIGRAM(S): at 12:18

## 2018-11-21 RX ADMIN — Medication 5 MILLIGRAM(S): at 18:27

## 2018-11-21 RX ADMIN — Medication 650 MILLIGRAM(S): at 00:35

## 2018-11-21 RX ADMIN — ENOXAPARIN SODIUM 40 MILLIGRAM(S): 100 INJECTION SUBCUTANEOUS at 23:26

## 2018-11-21 RX ADMIN — Medication 3 MILLIGRAM(S): at 23:24

## 2018-11-21 RX ADMIN — Medication 1 TABLET(S): at 12:14

## 2018-11-21 RX ADMIN — Medication 650 MILLIGRAM(S): at 12:13

## 2018-11-21 RX ADMIN — PANTOPRAZOLE SODIUM 40 MILLIGRAM(S): 20 TABLET, DELAYED RELEASE ORAL at 06:44

## 2018-11-21 RX ADMIN — DONEPEZIL HYDROCHLORIDE 10 MILLIGRAM(S): 10 TABLET, FILM COATED ORAL at 12:14

## 2018-11-21 RX ADMIN — SODIUM CHLORIDE 50 MILLILITER(S): 9 INJECTION INTRAMUSCULAR; INTRAVENOUS; SUBCUTANEOUS at 01:32

## 2018-11-21 RX ADMIN — Medication 250 MILLIGRAM(S): at 18:26

## 2018-11-21 RX ADMIN — FAMOTIDINE 40 MILLIGRAM(S): 10 INJECTION INTRAVENOUS at 23:24

## 2018-11-21 NOTE — PROGRESS NOTE ADULT - ASSESSMENT
88 y/o F with PMHx of CAD, h/o right sided Lung ca s/p RT , HLD, and GERD, CBP, overactive bladder presenting to the  with 1 days history of  lower right leg pain and redness .  Pt was seen in ED in 09/2018 with laceration to right shin s/p slip and fall. 19 stitches were placed which have since been removed. Last night, pt started to experience erythema and pain to site of wound on RLE. Seen by PMD Dr. Cuevas today who advised pt to come into ED for evaluation, pt was taking abx for recently dx UTI.  No falls or other trauma to the leg since original injury in September. Denies any abd pain, nausea, vomiting, but has poor appetite, no fevers, chills, no CP, SOB, numbness, or weakness. Patient reports occasional dry cough and some lung sounds on and off in last couple of weeks.    in ED - T(F): 98, Max: 98.2 HR: 67 ( (67 - 77) BP: 134/67  (134/67 - 147/72) RR: 19 (19 - 19) SpO2: 100%  (100% - 100%)WBC 13, BUN 32, Cr 0.68 EKG - sinus , RBBB, no ischemic changes, CXR - no infiltrates, s/p Clindamycin in ED     * RLE erythema and pain suspected acute cellulitis r/o DVT  - X ray - no fx  - doppler - neg for DVT  - s/p Clinda in ED, c/w IV Ceftriaxone and Vanco per ID.   - f/u cultures , check UA, urine cx  - ID consult appreciated: MRI ordered r/o OM  - local wound care  - elevation  - trend WBC    * cough h/o lung CA s/p RT  - CXR - clear  - follows with Dr. Giron for serial CT , next schedule for december    * HTN  - c/w coreg    * HLD   - c/w statins    * overactive bladder, recent UTI  - c/w oxybutinin  - f/u UA, urine cx, completed 5/7 days of Macrobid.   - ceftriaxone will cover UTI     * Dementia  - c/w aricept    * GERD  - c/w PPI and h2 blocker    Advanced directives  - FULL code    DVT proph - lovenox  Dispo: remain inpatient on IV abx.   Discussed on IDR.   Total time > 40 mins.

## 2018-11-21 NOTE — CONSULT NOTE ADULT - ASSESSMENT
88 y/o Female with h/o CAD, Lung Ca s/p RT, HLD, GERD, overactive bladder was admitted on 11/20 for lower right leg pain and redness x one day at a site of prior leg laceration.  Pt was seen in ED in 09/2018 with laceration to right shin s/p slip and fall and had 19 stitches placed which have since been removed. The night PTA, pt developed erythema and pain to site of prior RLE wound. Seen by PMD Dr. Cuevas and advised pt to come into ED for evaluation.  No falls or other trauma to the leg since original injury in September. In ER, she received clindamycin.    1. 89 y/o Female with h/o CAD, Lung Ca s/p RT, HLD, GERD, overactive bladder was admitted on 11/20 for lower right leg pain and redness x one day at a site of prior leg laceration.  Pt was seen in ED in 09/2018 with laceration to right shin s/p slip and fall and had 19 stitches placed which have since been removed. The right shin wound never healed completely; she had a persistent small opening on anterior shin area. The night PTA, pt developed erythema and pain to site of her RLE wound. Seen by PMD Dr. Cuevas and advised pt to come into ED for evaluation. In ER, she received clindamycin.    1. Right anterior shin cellulitis with open ulcer. 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  -concerned that her leg wound did not completely heal after her traumatic event more than 2 month ago  -obtain wound c/s  -start vancomycin 750 mg IV q12h and ceftriaxone 1 gm IV qd  -reason for abx use and side effects reviewed with patient; monitor BMP and vancomycin trough levels   -obtain MRI right lower leg with and without contrast  -local wound care  -old chart reviewed to assess prior cultures  -monitor temps  -f/u CBC  -supportive care  2. Other issues:   -care per medicine

## 2018-11-21 NOTE — CONSULT NOTE ADULT - SUBJECTIVE AND OBJECTIVE BOX
Patient is a 89y old  Female who presents with a chief complaint of right leg pain redness and swelling (2018 20:31)    HPI:  88 y/o Female with h/o CAD, Lung Ca s/p RT, HLD, GERD, overactive bladder was admitted on  for lower right leg pain and redness x one day at a site of prior leg laceration.  Pt was seen in ED in 2018 with laceration to right shin s/p slip and fall and had 19 stitches placed which have since been removed. The night PTA, pt developed erythema and pain to site of prior RLE wound. Seen by PMD Dr. Cuevas and advised pt to come into ED for evaluation.  No falls or other trauma to the leg since original injury in September. In ER, she received clindamycin.      PMH: as above  PSH: as above  Meds: per reconciliation sheet, noted below  MEDICATIONS  (STANDING):  acetaminophen   Tablet .. 650 milliGRAM(s) Oral every 8 hours  carvedilol 6.25 milliGRAM(s) Oral every 12 hours  cefTRIAXone   IVPB 1 Gram(s) IV Intermittent every 24 hours  cefTRIAXone   IVPB      cholecalciferol 400 Unit(s) Oral daily  donepezil 10 milliGRAM(s) Oral daily  enoxaparin Injectable 40 milliGRAM(s) SubCutaneous every 24 hours  famotidine    Tablet 40 milliGRAM(s) Oral at bedtime  lactobacillus acidophilus 1 Tablet(s) Oral daily  oxybutynin 5 milliGRAM(s) Oral two times a day  pantoprazole    Tablet 40 milliGRAM(s) Oral before breakfast  pyridoxine 100 milliGRAM(s) Oral daily  simvastatin 20 milliGRAM(s) Oral at bedtime  sodium chloride 0.9%. 1000 milliLiter(s) (50 mL/Hr) IV Continuous <Continuous>    MEDICATIONS  (PRN):  acetaminophen   Tablet .. 650 milliGRAM(s) Oral every 6 hours PRN Temp greater or equal to 38C (100.4F), Moderate Pain (4 - 6)  aluminum hydroxide/magnesium hydroxide/simethicone Suspension 30 milliLiter(s) Oral every 4 hours PRN Dyspepsia  ondansetron Injectable 4 milliGRAM(s) IV Push every 6 hours PRN Nausea    Allergies    Lotrel (Unknown)  sulfa drugs (Unknown)    Intolerances      Social: no smoking, no alcohol, no illegal drugs; no recent travel, no exposure to TB  FAMILY HISTORY:  Family history of cancer in father (Father)    no history of premature cardiovascular disease in first degree relatives    ROS: the patient denies fever, no chills, no HA, no seizures, no dizziness, no sore throat, no nasal congestion, no blurry vision, no CP, no palpitations, no SOB, no cough, no abdominal pain, no diarrhea, no N/V, no dysuria, no leg pain, no claudication, no rash, no joint aches, no rectal pain or bleeding, no night sweats  All other systems reviewed and are negative    Vital Signs Last 24 Hrs  T(C): 36.6 (2018 11:15), Max: 36.8 (2018 15:39)  T(F): 97.9 (2018 11:15), Max: 98.2 (2018 15:39)  HR: 83 (2018 11:15) (67 - 83)  BP: 137/48 (2018 11:15) (133/56 - 147/72)  BP(mean): --  RR: 17 (2018 11:15) (17 - 19)  SpO2: 97% (2018 11:15) (97% - 100%)  Daily Height in cm: 149.86 (2018 15:25)    Daily Weight in k.2 (2018 22:19)    PE:    Constitutional: frail looking  HEENT: NC/AT, EOMI, PERRLA, conjunctivae clear; ears and nose atraumatic; pharynx benign  Neck: supple; thyroid not palpable  Back: no tenderness  Respiratory: respiratory effort normal; clear to auscultation  Cardiovascular: S1S2 regular, no murmurs  Abdomen: soft, not tender, not distended, positive BS; no liver or spleen organomegaly  Genitourinary: no suprapubic tenderness  Lymphatic: no LN palpable  Musculoskeletal: no muscle tenderness, no joint swelling or tenderness  Extremities: no pedal edema  Neurological/ Psychiatric: AxOx3, judgement and insight normal; moving all extremities  Skin: no rashes; no palpable lesions    Labs: all available labs reviewed                        12.4   10.06 )-----------( 179      ( 2018 07:24 )             36.8     11-    141  |  106  |  26<H>  ----------------------------<  97  4.0   |  25  |  0.60    Ca    8.0<L>      2018 07:24  Phos  3.1       Mg     2.0         TPro  5.9<L>  /  Alb  2.7<L>  /  TBili  0.5  /  DBili  x   /  AST  23  /  ALT  39  /  AlkPhos  39<L>       LIVER FUNCTIONS - ( 2018 07:24 )  Alb: 2.7 g/dL / Pro: 5.9 gm/dL / ALK PHOS: 39 U/L / ALT: 39 U/L / AST: 23 U/L / GGT: x           Urinalysis Basic - ( 2018 07:00 )    Color: Yellow / Appearance: Clear / S.020 / pH: x  Gluc: x / Ketone: Negative  / Bili: Negative / Urobili: Negative mg/dL   Blood: x / Protein: Negative mg/dL / Nitrite: Negative   Leuk Esterase: Negative / RBC: x / WBC x   Sq Epi: x / Non Sq Epi: x / Bacteria: x          Radiology: all available radiological tests reviewed    Advanced directives addressed: full resuscitation Patient is a 89y old  Female who presents with a chief complaint of right leg pain redness and swelling (2018 20:31)    HPI:  89 y/o Female with h/o CAD, Lung Ca s/p RT, HLD, GERD, overactive bladder was admitted on  for lower right leg pain and redness x one day at a site of prior leg laceration.  Pt was seen in ED in 2018 with laceration to right shin s/p slip and fall and had 19 stitches placed which have since been removed. The right shin wound never healed completely; she had a persistent small opening on anterior shin area. The night PTA, pt developed erythema and pain to site of her RLE wound. Seen by PMD Dr. Cuevas and advised pt to come into ED for evaluation. In ER, she received clindamycin.      PMH: as above  PSH: as above  Meds: per reconciliation sheet, noted below  MEDICATIONS  (STANDING):  acetaminophen   Tablet .. 650 milliGRAM(s) Oral every 8 hours  carvedilol 6.25 milliGRAM(s) Oral every 12 hours  cefTRIAXone   IVPB 1 Gram(s) IV Intermittent every 24 hours  cefTRIAXone   IVPB      cholecalciferol 400 Unit(s) Oral daily  donepezil 10 milliGRAM(s) Oral daily  enoxaparin Injectable 40 milliGRAM(s) SubCutaneous every 24 hours  famotidine    Tablet 40 milliGRAM(s) Oral at bedtime  lactobacillus acidophilus 1 Tablet(s) Oral daily  oxybutynin 5 milliGRAM(s) Oral two times a day  pantoprazole    Tablet 40 milliGRAM(s) Oral before breakfast  pyridoxine 100 milliGRAM(s) Oral daily  simvastatin 20 milliGRAM(s) Oral at bedtime  sodium chloride 0.9%. 1000 milliLiter(s) (50 mL/Hr) IV Continuous <Continuous>    MEDICATIONS  (PRN):  acetaminophen   Tablet .. 650 milliGRAM(s) Oral every 6 hours PRN Temp greater or equal to 38C (100.4F), Moderate Pain (4 - 6)  aluminum hydroxide/magnesium hydroxide/simethicone Suspension 30 milliLiter(s) Oral every 4 hours PRN Dyspepsia  ondansetron Injectable 4 milliGRAM(s) IV Push every 6 hours PRN Nausea    Allergies    Lotrel (Unknown)  sulfa drugs (Unknown)    Intolerances      Social: no smoking, no alcohol, no illegal drugs; no recent travel, no exposure to TB  FAMILY HISTORY:  Family history of cancer in father (Father)    no history of premature cardiovascular disease in first degree relatives    ROS: the patient denies fever, no chills, no HA, no seizures, no dizziness, no sore throat, no nasal congestion, no blurry vision, no CP, no palpitations, no SOB, no cough, no abdominal pain, no diarrhea, no N/V, no dysuria, no leg pain, no claudication, has RLE rash, no joint aches, no rectal pain or bleeding, no night sweats  All other systems reviewed and are negative    Vital Signs Last 24 Hrs  T(C): 36.6 (2018 11:15), Max: 36.8 (2018 15:39)  T(F): 97.9 (2018 11:15), Max: 98.2 (2018 15:39)  HR: 83 (2018 11:15) (67 - 83)  BP: 137/48 (2018 11:15) (133/56 - 147/72)  BP(mean): --  RR: 17 (2018 11:15) (17 - 19)  SpO2: 97% (2018 11:15) (97% - 100%)  Daily Height in cm: 149.86 (2018 15:25)    Daily Weight in k.2 (2018 22:19)    PE:    Constitutional: frail looking  HEENT: NC/AT, EOMI, PERRLA, conjunctivae clear; ears and nose atraumatic; pharynx benign  Neck: supple; thyroid not palpable  Back: no tenderness  Respiratory: respiratory effort normal; clear to auscultation  Cardiovascular: S1S2 regular, no murmurs  Abdomen: soft, not tender, not distended, positive BS; no liver or spleen organomegaly  Genitourinary: no suprapubic tenderness  Lymphatic: no LN palpable  Musculoskeletal: no muscle tenderness, no joint swelling or tenderness  Right shin small open ulcer with scant serous discharge; surrounding erythema and edema  Extremities: no pedal edema  Neurological/ Psychiatric: AxOx3, judgement and insight normal; moving all extremities  Skin: no rashes; no palpable lesions    Labs: all available labs reviewed                        12.4   10.06 )-----------( 179      ( 2018 07:24 )             36.8     -    141  |  106  |  26<H>  ----------------------------<  97  4.0   |  25  |  0.60    Ca    8.0<L>      2018 07:24  Phos  3.1       Mg     2.0         TPro  5.9<L>  /  Alb  2.7<L>  /  TBili  0.5  /  DBili  x   /  AST  23  /  ALT  39  /  AlkPhos  39<L>       LIVER FUNCTIONS - ( 2018 07:24 )  Alb: 2.7 g/dL / Pro: 5.9 gm/dL / ALK PHOS: 39 U/L / ALT: 39 U/L / AST: 23 U/L / GGT: x           Urinalysis Basic - ( 2018 07:00 )    Color: Yellow / Appearance: Clear / S.020 / pH: x  Gluc: x / Ketone: Negative  / Bili: Negative / Urobili: Negative mg/dL   Blood: x / Protein: Negative mg/dL / Nitrite: Negative   Leuk Esterase: Negative / RBC: x / WBC x   Sq Epi: x / Non Sq Epi: x / Bacteria: x          Radiology: all available radiological tests reviewed    Advanced directives addressed: full resuscitation

## 2018-11-21 NOTE — PROGRESS NOTE ADULT - SUBJECTIVE AND OBJECTIVE BOX
cc; right leg infection    HPI: 88 y/o F with PMHx of CAD, h/o right sided Lung ca s/p RT, HLD, and GERD, CBP, overactive bladder presenting to the  with 1 days history of  lower right leg pain and redness .  Pt was seen in ED in 09/2018 with laceration to right shin s/p slip and fall. 19 stitches were placed which have since been removed. Last night, pt started to experience erythema and pain to site of wound on RLE. Seen by PMD Dr. Cuevas today who advised pt to come into ED for evaluation, pt was taking abx for recently dx UTI.  No falls or other trauma to the leg since original injury in September. Denies any abd pain, nausea, vomiting, but has poor appetite, no fevers, chills, no CP, SOB, numbness, or weakness. Patient reports occasional dry cough and some lung sounds on and off in last couple of weeks.    in ED - T(F): 98, Max: 98.2 HR: 67 ( (67 - 77) BP: 134/67  (134/67 - 147/72) RR: 19 (19 - 19) SpO2: 100%  (100% - 100%)WBC 13, BUN 32, Cr 0.68 EKG - sinus , RBBB, no ischemic changes, CXR - no infiltrates, s/p Clindamycin in ED     11/21: No fever or chills, seen with son at bedside, discussed case. Foot pain decreasing. No CP.    ROS; neg unless stated above.     Vital Signs Last 24 Hrs  T(C): 36.6 (21 Nov 2018 11:15), Max: 36.8 (20 Nov 2018 15:39)  T(F): 97.9 (21 Nov 2018 11:15), Max: 98.2 (20 Nov 2018 15:39)  HR: 83 (21 Nov 2018 11:15) (67 - 83)  BP: 137/48 (21 Nov 2018 11:15) (133/56 - 147/72)  BP(mean): --  RR: 17 (21 Nov 2018 11:15) (17 - 19)  SpO2: 97% (21 Nov 2018 11:15) (97% - 100%)    PHYSICAL EXAM:  Constitutional: NAD, awake and alert, well-developed elderly female  HEENT: PERR, EOMI, Normal Hearing, MMM  Neck: Soft and supple, No LAD, No JVD  Respiratory: Breath sounds are clear bilaterally, No wheezing, rales or rhonchi  Cardiovascular: S1 and S2, regular rate and rhythm, no Murmurs, gallops or rubs  Gastrointestinal: Bowel Sounds present, soft, nontender, nondistended, no guarding, no rebound  Extremities: No peripheral edema except right leg with small open ulcer and surrounding edema and erythema.   Vascular: 2+ peripheral pulses  Neurological: A/O x 3, no focal deficits  Musculoskeletal: 5/5 strength b/l upper and lower extremities  Skin: No rashes    MEDICATIONS  (STANDING):  acetaminophen   Tablet .. 650 milliGRAM(s) Oral every 8 hours  carvedilol 6.25 milliGRAM(s) Oral every 12 hours  cefTRIAXone   IVPB 1 Gram(s) IV Intermittent every 24 hours  cefTRIAXone   IVPB      cholecalciferol 400 Unit(s) Oral daily  donepezil 10 milliGRAM(s) Oral daily  enoxaparin Injectable 40 milliGRAM(s) SubCutaneous every 24 hours  famotidine    Tablet 40 milliGRAM(s) Oral at bedtime  lactobacillus acidophilus 1 Tablet(s) Oral daily  oxybutynin 5 milliGRAM(s) Oral two times a day  pantoprazole    Tablet 40 milliGRAM(s) Oral before breakfast  pyridoxine 100 milliGRAM(s) Oral daily  simvastatin 20 milliGRAM(s) Oral at bedtime  sodium chloride 0.9%. 1000 milliLiter(s) (50 mL/Hr) IV Continuous <Continuous>  vancomycin  IVPB 750 milliGRAM(s) IV Intermittent every 12 hours    LABS: All Labs Reviewed:                        12.4   10.06 )-----------( 179      ( 21 Nov 2018 07:24 )             36.8     11-21    141  |  106  |  26<H>  ----------------------------<  97  4.0   |  25  |  0.60    Ca    8.0<L>      21 Nov 2018 07:24  Phos  3.1     11-21  Mg     2.0     11-21    TPro  5.9<L>  /  Alb  2.7<L>  /  TBili  0.5  /  DBili  x   /  AST  23  /  ALT  39  /  AlkPhos  39<L>  11-21    Blood Culture: pending  Xray of Tib/Fib negative for fracture  Doppler US Right foot: neg for DVT

## 2018-11-22 LAB
CULTURE RESULTS: NO GROWTH — SIGNIFICANT CHANGE UP
SPECIMEN SOURCE: SIGNIFICANT CHANGE UP

## 2018-11-22 RX ORDER — LANOLIN ALCOHOL/MO/W.PET/CERES
3 CREAM (GRAM) TOPICAL ONCE
Qty: 0 | Refills: 0 | Status: COMPLETED | OUTPATIENT
Start: 2018-11-22 | End: 2018-11-22

## 2018-11-22 RX ORDER — CEFTRIAXONE 500 MG/1
1000 INJECTION, POWDER, FOR SOLUTION INTRAMUSCULAR; INTRAVENOUS EVERY 24 HOURS
Qty: 0 | Refills: 0 | Status: DISCONTINUED | OUTPATIENT
Start: 2018-11-22 | End: 2018-11-24

## 2018-11-22 RX ADMIN — Medication 650 MILLIGRAM(S): at 06:22

## 2018-11-22 RX ADMIN — CEFTRIAXONE 1000 MILLIGRAM(S): 500 INJECTION, POWDER, FOR SOLUTION INTRAMUSCULAR; INTRAVENOUS at 00:49

## 2018-11-22 RX ADMIN — Medication 650 MILLIGRAM(S): at 23:58

## 2018-11-22 RX ADMIN — SIMVASTATIN 20 MILLIGRAM(S): 20 TABLET, FILM COATED ORAL at 23:57

## 2018-11-22 RX ADMIN — CARVEDILOL PHOSPHATE 6.25 MILLIGRAM(S): 80 CAPSULE, EXTENDED RELEASE ORAL at 18:27

## 2018-11-22 RX ADMIN — Medication 250 MILLIGRAM(S): at 18:27

## 2018-11-22 RX ADMIN — Medication 5 MILLIGRAM(S): at 18:27

## 2018-11-22 RX ADMIN — Medication 650 MILLIGRAM(S): at 05:52

## 2018-11-22 RX ADMIN — CARVEDILOL PHOSPHATE 6.25 MILLIGRAM(S): 80 CAPSULE, EXTENDED RELEASE ORAL at 05:53

## 2018-11-22 RX ADMIN — Medication 1 TABLET(S): at 12:39

## 2018-11-22 RX ADMIN — Medication 250 MILLIGRAM(S): at 05:52

## 2018-11-22 RX ADMIN — FAMOTIDINE 40 MILLIGRAM(S): 10 INJECTION INTRAVENOUS at 23:58

## 2018-11-22 RX ADMIN — Medication 400 UNIT(S): at 12:37

## 2018-11-22 RX ADMIN — DONEPEZIL HYDROCHLORIDE 10 MILLIGRAM(S): 10 TABLET, FILM COATED ORAL at 12:36

## 2018-11-22 RX ADMIN — Medication 100 MILLIGRAM(S): at 12:37

## 2018-11-22 RX ADMIN — ENOXAPARIN SODIUM 40 MILLIGRAM(S): 100 INJECTION SUBCUTANEOUS at 23:59

## 2018-11-22 RX ADMIN — Medication 3 MILLIGRAM(S): at 23:58

## 2018-11-22 RX ADMIN — Medication 5 MILLIGRAM(S): at 05:51

## 2018-11-22 RX ADMIN — PANTOPRAZOLE SODIUM 40 MILLIGRAM(S): 20 TABLET, DELAYED RELEASE ORAL at 05:51

## 2018-11-22 NOTE — PROGRESS NOTE ADULT - SUBJECTIVE AND OBJECTIVE BOX
cc; right leg infection    HPI: 88 y/o F with PMHx of CAD, h/o right sided Lung ca s/p RT, HLD, and GERD, CBP, overactive bladder presenting to the  with 1 days history of  lower right leg pain and redness .  Pt was seen in ED in 09/2018 with laceration to right shin s/p slip and fall. 19 stitches were placed which have since been removed. Last night, pt started to experience erythema and pain to site of wound on RLE. Seen by PMD Dr. Cuevas today who advised pt to come into ED for evaluation, pt was taking abx for recently dx UTI.  No falls or other trauma to the leg since original injury in September. Denies any abd pain, nausea, vomiting, but has poor appetite, no fevers, chills, no CP, SOB, numbness, or weakness. Patient reports occasional dry cough and some lung sounds on and off in last couple of weeks.    in ED - T(F): 98, Max: 98.2 HR: 67 ( (67 - 77) BP: 134/67  (134/67 - 147/72) RR: 19 (19 - 19) SpO2: 100%  (100% - 100%)WBC 13, BUN 32, Cr 0.68 EKG - sinus , RBBB, no ischemic changes, CXR - no infiltrates, s/p Clindamycin in ED     11/21: No fever or chills, seen with son at bedside, discussed case. Foot pain decreasing. No CP.    11/22: Eager for dc but right leg still swollen, denies pain around open ulcer. No fevers.     ROS; neg unless stated above.     Vital Signs Last 24 Hrs  T(C): 36.8 (22 Nov 2018 05:38), Max: 36.8 (22 Nov 2018 05:38)  T(F): 98.2 (22 Nov 2018 05:38), Max: 98.2 (22 Nov 2018 05:38)  HR: 75 (22 Nov 2018 05:38) (75 - 89)  BP: 127/59 (22 Nov 2018 05:38) (127/59 - 145/67)  BP(mean): --  RR: 17 (22 Nov 2018 05:38) (17 - 17)  SpO2: 97% (22 Nov 2018 05:38) (97% - 97%)    PHYSICAL EXAM:  Constitutional: NAD, awake and alert, well-developed elderly female  HEENT: PERR, EOMI, Normal Hearing, MMM  Neck: Soft and supple, No LAD, No JVD  Respiratory: Breath sounds are clear bilaterally, No wheezing, rales or rhonchi  Cardiovascular: S1 and S2, regular rate and rhythm, no Murmurs, gallops or rubs  Gastrointestinal: Bowel Sounds present, soft, nontender, nondistended, no guarding, no rebound  Extremities: No peripheral edema except right leg with small open ulcer and surrounding edema and erythema.   Vascular: 2+ peripheral pulses  Neurological: A/O x 3, no focal deficits  Musculoskeletal: 5/5 strength b/l upper and lower extremities  Skin: No rashes    MEDICATIONS  (STANDING):  acetaminophen   Tablet .. 650 milliGRAM(s) Oral every 8 hours  carvedilol 6.25 milliGRAM(s) Oral every 12 hours  cefTRIAXone Injectable. 1000 milliGRAM(s) IV Push every 24 hours  cholecalciferol 400 Unit(s) Oral daily  donepezil 10 milliGRAM(s) Oral daily  enoxaparin Injectable 40 milliGRAM(s) SubCutaneous every 24 hours  famotidine    Tablet 40 milliGRAM(s) Oral at bedtime  lactobacillus acidophilus 1 Tablet(s) Oral daily  oxybutynin 5 milliGRAM(s) Oral two times a day  pantoprazole    Tablet 40 milliGRAM(s) Oral before breakfast  pyridoxine 100 milliGRAM(s) Oral daily  simvastatin 20 milliGRAM(s) Oral at bedtime  vancomycin  IVPB 750 milliGRAM(s) IV Intermittent every 12 hours    LABS: All Labs Reviewed:                        12.4   10.06 )-----------( 179      ( 21 Nov 2018 07:24 )             36.8     11-21    141  |  106  |  26<H>  ----------------------------<  97  4.0   |  25  |  0.60    Ca    8.0<L>      21 Nov 2018 07:24  Phos  3.1     11-21  Mg     2.0     11-21    TPro  5.9<L>  /  Alb  2.7<L>  /  TBili  0.5  /  DBili  x   /  AST  23  /  ALT  39  /  AlkPhos  39<L>  11-21    Blood Culture: NEGATIVE  Xray of Tib/Fib negative for fracture  Doppler US Right foot: neg for DVT  MRI: no evidence of OM

## 2018-11-22 NOTE — PROGRESS NOTE ADULT - ASSESSMENT
89 y/o Female with h/o CAD, Lung Ca s/p RT, HLD, GERD, overactive bladder was admitted on 11/20 for lower right leg pain and redness x one day at a site of prior leg laceration.  Pt was seen in ED in 09/2018 with laceration to right shin s/p slip and fall and had 19 stitches placed which have since been removed. The right shin wound never healed completely; she had a persistent small opening on anterior shin area. The night PTA, pt developed erythema and pain to site of her RLE wound. Seen by PMD Dr. Cuevas and advised pt to come into ED for evaluation. In ER, she received clindamycin.    1. Right anterior shin cellulitis with open ulcer.   -right lower leg pain is improving  -MRI reviewed - no evidence of OM.  -concerned that her leg wound did not completely heal after her traumatic event more than 2 month ago  -wound c/s was not collected, even though order was placed and RN was told to collect culture  -on vancomycin 750 mg IV q12h and ceftriaxone 1 gm IV qd # 2  -tolerating abx well so far; no side effects noted  -obtain vancomycin trough level  -continue IV abx coverage for now  -local wound care  -monitor temps  -f/u CBC  -supportive care  2. Other issues:   -care per medicine

## 2018-11-22 NOTE — PROGRESS NOTE ADULT - SUBJECTIVE AND OBJECTIVE BOX
Date of service: 18 @ 11:02    Lying in bed in NAD  She is eager to go home  Right shin and lower led pain is improving    ROS: no fever or chills; denies dizziness, no HA, no SOB or cough, no abdominal pain, no diarrhea or constipation; no dysuria    MEDICATIONS  (STANDING):  acetaminophen   Tablet .. 650 milliGRAM(s) Oral every 8 hours  carvedilol 6.25 milliGRAM(s) Oral every 12 hours  cefTRIAXone Injectable. 1000 milliGRAM(s) IV Push every 24 hours  cholecalciferol 400 Unit(s) Oral daily  donepezil 10 milliGRAM(s) Oral daily  enoxaparin Injectable 40 milliGRAM(s) SubCutaneous every 24 hours  famotidine    Tablet 40 milliGRAM(s) Oral at bedtime  lactobacillus acidophilus 1 Tablet(s) Oral daily  oxybutynin 5 milliGRAM(s) Oral two times a day  pantoprazole    Tablet 40 milliGRAM(s) Oral before breakfast  pyridoxine 100 milliGRAM(s) Oral daily  simvastatin 20 milliGRAM(s) Oral at bedtime  vancomycin  IVPB 750 milliGRAM(s) IV Intermittent every 12 hours      Vital Signs Last 24 Hrs  T(C): 36.8 (2018 05:38), Max: 36.8 (2018 05:38)  T(F): 98.2 (2018 05:38), Max: 98.2 (2018 05:38)  HR: 75 (2018 05:38) (75 - 89)  BP: 127/59 (2018 05:38) (127/59 - 145/67)  BP(mean): --  RR: 17 (2018 05:38) (17 - 17)  SpO2: 97% (2018 05:38) (97% - 97%)    Physical Exam:      Constitutional: frail looking  HEENT: NC/AT, EOMI, PERRLA, conjunctivae clear  Neck: supple; thyroid not palpable  Back: no tenderness  Respiratory: respiratory effort normal; clear to auscultation  Cardiovascular: S1S2 regular, no murmurs  Abdomen: soft, not tender, not distended, positive BS  Genitourinary: no suprapubic tenderness  Lymphatic: no LN palpable  Musculoskeletal: no muscle tenderness, no joint swelling or tenderness  Right shin small open ulcer with scant serous discharge; surrounding erythema and edema  Extremities: no pedal edema  Neurological/ Psychiatric: AxOx3, moving all extremities  Skin: no rashes; no palpable lesions    Labs: reviewed                        12.4   10.06 )-----------( 179      ( 2018 07:24 )             36.8     -    141  |  106  |  26<H>  ----------------------------<  97  4.0   |  25  |  0.60    Ca    8.0<L>      2018 07:24  Phos  3.1       Mg     2.0         TPro  5.9<L>  /  Alb  2.7<L>  /  TBili  0.5  /  DBili  x   /  AST  23  /  ALT  39  /  AlkPhos  39<L>       LIVER FUNCTIONS - ( 2018 07:24 )  Alb: 2.7 g/dL / Pro: 5.9 gm/dL / ALK PHOS: 39 U/L / ALT: 39 U/L / AST: 23 U/L / GGT: x           Urinalysis Basic - ( 2018 07:00 )    Color: Yellow / Appearance: Clear / S.020 / pH: x  Gluc: x / Ketone: Negative  / Bili: Negative / Urobili: Negative mg/dL   Blood: x / Protein: Negative mg/dL / Nitrite: Negative   Leuk Esterase: Negative / RBC: x / WBC x   Sq Epi: x / Non Sq Epi: x / Bacteria: x      Culture - Blood (collected 2018 16:46)  Source: .Blood None  Preliminary Report (2018 22:01):    No growth to date.    Culture - Blood (collected 2018 16:46)  Source: .Blood None  Preliminary Report (2018 22:01):    No growth to date.    Culture - Urine (collected 2018 07:00)  Source: .Urine Clean Catch (Midstream)  Final Report (2018 08:51):    No growth          Radiology: all available radiological tests reviewed    < from: MR Lower Ext Non-joint No Cont, Left (18 @ 21:54) >   Open wound anterior to the midshaft of the tibia with   adjacent soft tissue edema and mild intramuscular edema within the   anterior muscle compartment. There is no soft tissue or intramuscular   abscess. These findings are compatible with cellulitis and reactive   myositis.  There is no evidence of osteomyelitis.    < end of copied text >      Advanced directives addressed: full resuscitation

## 2018-11-22 NOTE — PROGRESS NOTE ADULT - ASSESSMENT
89 y/o F with PMHx of CAD, h/o right sided Lung ca s/p RT , HLD, and GERD, CBP, overactive bladder presenting to the  with 1 days history of  lower right leg pain and redness .  Pt was seen in ED in 09/2018 with laceration to right shin s/p slip and fall. 19 stitches were placed which have since been removed. Last night, pt started to experience erythema and pain to site of wound on RLE. Seen by PMD Dr. Cuevas today who advised pt to come into ED for evaluation, pt was taking abx for recently dx UTI.  No falls or other trauma to the leg since original injury in September. Denies any abd pain, nausea, vomiting, but has poor appetite, no fevers, chills, no CP, SOB, numbness, or weakness. Patient reports occasional dry cough and some lung sounds on and off in last couple of weeks.    in ED - T(F): 98, Max: 98.2 HR: 67 ( (67 - 77) BP: 134/67  (134/67 - 147/72) RR: 19 (19 - 19) SpO2: 100%  (100% - 100%)WBC 13, BUN 32, Cr 0.68 EKG - sinus , RBBB, no ischemic changes, CXR - no infiltrates, s/p Clindamycin in ED     * RLE erythema and pain suspected acute cellulitis r/o DVT  - X ray - no fx  - doppler - neg for DVT  - s/p Clinda in ED, c/w IV Ceftriaxone and Vanco per day #2.   - f/u wound cultures , neg urine ctx.   - ID consult appreciated: MRI without evidence of OM  - local wound care  - elevation  - NEED WOUND CTX and results prior to discharge.     * cough h/o lung CA s/p RT  - CXR - clear  - follows with Dr. Giron for serial CT , next schedule for december    * HTN  - c/w coreg    * HLD   - c/w statins    * overactive bladder, recent UTI  - c/w oxybutinin  - f/u UA, urine cx, completed 5/7 days of Macrobid.   - ceftriaxone will cover UTI     * Dementia  - c/w aricept    * GERD  - c/w PPI and h2 blocker    Advanced directives  - FULL code    DVT proph - lovenox  Dispo: remain inpatient on IV abx.   Discussed w/ Dr. Dacosta (ID). Tentative dc home in 1-2 days. Lives alone, independent with ADL's  Total time > 40 mins.

## 2018-11-23 LAB
ANION GAP SERPL CALC-SCNC: 6 MMOL/L — SIGNIFICANT CHANGE UP (ref 5–17)
BUN SERPL-MCNC: 27 MG/DL — HIGH (ref 7–23)
CALCIUM SERPL-MCNC: 8.1 MG/DL — LOW (ref 8.5–10.1)
CHLORIDE SERPL-SCNC: 107 MMOL/L — SIGNIFICANT CHANGE UP (ref 96–108)
CO2 SERPL-SCNC: 30 MMOL/L — SIGNIFICANT CHANGE UP (ref 22–31)
CREAT SERPL-MCNC: 0.8 MG/DL — SIGNIFICANT CHANGE UP (ref 0.5–1.3)
GLUCOSE SERPL-MCNC: 109 MG/DL — HIGH (ref 70–99)
POTASSIUM SERPL-MCNC: 4.4 MMOL/L — SIGNIFICANT CHANGE UP (ref 3.5–5.3)
POTASSIUM SERPL-SCNC: 4.4 MMOL/L — SIGNIFICANT CHANGE UP (ref 3.5–5.3)
SODIUM SERPL-SCNC: 143 MMOL/L — SIGNIFICANT CHANGE UP (ref 135–145)
VANCOMYCIN TROUGH SERPL-MCNC: 13.3 UG/ML — SIGNIFICANT CHANGE UP (ref 10–20)

## 2018-11-23 RX ORDER — LANOLIN ALCOHOL/MO/W.PET/CERES
3 CREAM (GRAM) TOPICAL AT BEDTIME
Qty: 0 | Refills: 0 | Status: DISCONTINUED | OUTPATIENT
Start: 2018-11-23 | End: 2018-11-25

## 2018-11-23 RX ADMIN — PANTOPRAZOLE SODIUM 40 MILLIGRAM(S): 20 TABLET, DELAYED RELEASE ORAL at 05:35

## 2018-11-23 RX ADMIN — DONEPEZIL HYDROCHLORIDE 10 MILLIGRAM(S): 10 TABLET, FILM COATED ORAL at 12:11

## 2018-11-23 RX ADMIN — CARVEDILOL PHOSPHATE 6.25 MILLIGRAM(S): 80 CAPSULE, EXTENDED RELEASE ORAL at 09:25

## 2018-11-23 RX ADMIN — Medication 650 MILLIGRAM(S): at 00:28

## 2018-11-23 RX ADMIN — FAMOTIDINE 40 MILLIGRAM(S): 10 INJECTION INTRAVENOUS at 22:29

## 2018-11-23 RX ADMIN — Medication 400 UNIT(S): at 12:10

## 2018-11-23 RX ADMIN — Medication 250 MILLIGRAM(S): at 17:49

## 2018-11-23 RX ADMIN — ENOXAPARIN SODIUM 40 MILLIGRAM(S): 100 INJECTION SUBCUTANEOUS at 22:29

## 2018-11-23 RX ADMIN — CEFTRIAXONE 1000 MILLIGRAM(S): 500 INJECTION, POWDER, FOR SOLUTION INTRAMUSCULAR; INTRAVENOUS at 00:29

## 2018-11-23 RX ADMIN — CARVEDILOL PHOSPHATE 6.25 MILLIGRAM(S): 80 CAPSULE, EXTENDED RELEASE ORAL at 17:49

## 2018-11-23 RX ADMIN — SIMVASTATIN 20 MILLIGRAM(S): 20 TABLET, FILM COATED ORAL at 22:28

## 2018-11-23 RX ADMIN — Medication 5 MILLIGRAM(S): at 05:34

## 2018-11-23 RX ADMIN — Medication 100 MILLIGRAM(S): at 12:10

## 2018-11-23 RX ADMIN — Medication 5 MILLIGRAM(S): at 17:49

## 2018-11-23 RX ADMIN — Medication 650 MILLIGRAM(S): at 06:05

## 2018-11-23 RX ADMIN — CEFTRIAXONE 1000 MILLIGRAM(S): 500 INJECTION, POWDER, FOR SOLUTION INTRAMUSCULAR; INTRAVENOUS at 23:23

## 2018-11-23 RX ADMIN — Medication 3 MILLIGRAM(S): at 22:28

## 2018-11-23 RX ADMIN — Medication 250 MILLIGRAM(S): at 05:36

## 2018-11-23 RX ADMIN — Medication 1 TABLET(S): at 12:11

## 2018-11-23 RX ADMIN — Medication 650 MILLIGRAM(S): at 14:06

## 2018-11-23 RX ADMIN — Medication 650 MILLIGRAM(S): at 05:35

## 2018-11-23 RX ADMIN — Medication 650 MILLIGRAM(S): at 00:00

## 2018-11-23 NOTE — PROGRESS NOTE ADULT - SUBJECTIVE AND OBJECTIVE BOX
CC: right leg pain redness and swelling (22 Nov 2018 11:01)    HPI:  89 y/o F with PMHx of CAD, h/o right sided Lung ca s/p RT, HLD, and GERD, CBP, overactive bladder presenting to the  with 1 days history of  lower right leg pain and redness .  Pt was seen in ED in 09/2018 with laceration to right shin s/p slip and fall. 19 stitches were placed which have since been removed. Last night, pt started to experience erythema and pain to site of wound on RLE. Seen by PMD Dr. Cuevas today who advised pt to come into ED for evaluation, pt was taking abx for recently dx UTI.  No falls or other trauma to the leg since original injury in September. Denies any abd pain, nausea, vomiting, but has poor appetite, no fevers, chills, no CP, SOB, numbness, or weakness. Patient reports occasional dry cough and some lung sounds on and off in last couple of weeks.    in ED - T(F): 98, Max: 98.2 HR: 67 ( (67 - 77) BP: 134/67  (134/67 - 147/72) RR: 19 (19 - 19) SpO2: 100%  (100% - 100%)WBC 13, BUN 32, Cr 0.68 EKG - sinus , RBBB, no ischemic changes, CXR - no infiltrates, s/p Clindamycin in ED (20 Nov 2018 20:31)    INTERVAL HPI/OVERNIGHT EVENTS:    Vital Signs Last 24 Hrs  T(C): 36.4 (23 Nov 2018 05:06), Max: 36.5 (22 Nov 2018 20:47)  T(F): 97.5 (23 Nov 2018 05:06), Max: 97.7 (22 Nov 2018 20:47)  HR: 75 (23 Nov 2018 09:24) (68 - 77)  BP: 123/50 (23 Nov 2018 09:24) (102/41 - 141/50)  BP(mean): --  RR: 16 (23 Nov 2018 05:06) (16 - 17)  SpO2: 96% (23 Nov 2018 05:06) (96% - 98%)  I&O's Detail    23 Nov 2018 07:01  -  23 Nov 2018 12:11  --------------------------------------------------------  IN:    Oral Fluid: 360 mL  Total IN: 360 mL    OUT:  Total OUT: 0 mL    Total NET: 360 mL        REVIEW OF SYSTEMS:    CONSTITUTIONAL: No weakness, fevers or chills  EYES/ENT: No visual changes;  No vertigo or throat pain   NECK: No pain or stiffness  RESPIRATORY: No cough, wheezing, hemoptysis; No shortness of breath  CARDIOVASCULAR: No chest pain or palpitations  GASTROINTESTINAL: No abdominal or epigastric pain. No nausea, vomiting, or hematemesis; No diarrhea or constipation. No melena or hematochezia.  GENITOURINARY: No dysuria, frequency or hematuria  NEUROLOGICAL: No numbness or weakness  SKIN: No itching, burning, rashes, or lesions   All other review of systems is negative unless indicated above.  PHYSICAL EXAM:    General: Well developed; well nourished; in no acute distress  Eyes: PERRLA, EOMI; conjunctiva and sclera clear  Head: Normocephalic; atraumatic  ENMT: No nasal discharge; airway clear  Neck: Supple; non tender; no masses  Respiratory: No wheezes, rales or rhonchi  Cardiovascular: Regular rate and rhythm. S1 and S2 Normal; No murmurs, gallops or rubs  Gastrointestinal: Soft non-tender non-distended; Normal bowel sounds  Genitourinary: No costovertebral angle tenderness  Extremities: Normal range of motion, No clubbing, cyanosis or edema  Vascular: Peripheral pulses palpable 2+ bilaterally  Neurological: Alert and oriented x4  Skin: Warm and dry. No acute rash  Lymph Nodes: No acute cervical adenopathy  Musculoskeletal: Normal gait, tone, without deformities  Psychiatric: Cooperative and appropriate        23 Nov 2018 04:36    143    |  107    |  27     ----------------------------<  109    4.4     |  30     |  0.80     Ca    8.1        23 Nov 2018 04:36        CAPILLARY BLOOD GLUCOSE              MEDICATIONS  (STANDING):  acetaminophen   Tablet .. 650 milliGRAM(s) Oral every 8 hours  carvedilol 6.25 milliGRAM(s) Oral every 12 hours  cefTRIAXone Injectable. 1000 milliGRAM(s) IV Push every 24 hours  cholecalciferol 400 Unit(s) Oral daily  donepezil 10 milliGRAM(s) Oral daily  enoxaparin Injectable 40 milliGRAM(s) SubCutaneous every 24 hours  famotidine    Tablet 40 milliGRAM(s) Oral at bedtime  lactobacillus acidophilus 1 Tablet(s) Oral daily  oxybutynin 5 milliGRAM(s) Oral two times a day  pantoprazole    Tablet 40 milliGRAM(s) Oral before breakfast  pyridoxine 100 milliGRAM(s) Oral daily  simvastatin 20 milliGRAM(s) Oral at bedtime  vancomycin  IVPB 750 milliGRAM(s) IV Intermittent every 12 hours    MEDICATIONS  (PRN):  acetaminophen   Tablet .. 650 milliGRAM(s) Oral every 6 hours PRN Temp greater or equal to 38C (100.4F), Moderate Pain (4 - 6)  aluminum hydroxide/magnesium hydroxide/simethicone Suspension 30 milliLiter(s) Oral every 4 hours PRN Dyspepsia  ondansetron Injectable 4 milliGRAM(s) IV Push every 6 hours PRN Nausea      RADIOLOGY & ADDITIONAL TESTS: CC: right leg pain redness and swelling (22 Nov 2018 11:01)    HPI:  89 y/o F with PMHx of CAD, h/o right sided Lung ca s/p RT, HLD, and GERD, CBP, overactive bladder presenting to the  with 1 days history of  lower right leg pain and redness .  Pt was seen in ED in 09/2018 with laceration to right shin s/p slip and fall. 19 stitches were placed which have since been removed. Last night, pt started to experience erythema and pain to site of wound on RLE. Seen by PMD Dr. Cuevas today who advised pt to come into ED for evaluation, pt was taking abx for recently dx UTI.  No falls or other trauma to the leg since original injury in September. Denies any abd pain, nausea, vomiting, but has poor appetite, no fevers, chills, no CP, SOB, numbness, or weakness. Patient reports occasional dry cough and some lung sounds on and off in last couple of weeks.    in ED - T(F): 98, Max: 98.2 HR: 67 ( (67 - 77) BP: 134/67  (134/67 - 147/72) RR: 19 (19 - 19) SpO2: 100%  (100% - 100%)WBC 13, BUN 32, Cr 0.68 EKG - sinus , RBBB, no ischemic changes, CXR - no infiltrates, s/p Clindamycin in ED (20 Nov 2018 20:31)    INTERVAL HPI/ OVERNIGHT EVENTS: Chart reviewed, Pt was seen and examined,  OOB to chair, comfortable,  mildly confused. Has no complains, states that leg is better. Daughter in law at bedside. POC discussed       Vital Signs Last 24 Hrs  T(C): 36.8 (23 Nov 2018 12:37), Max: 36.8 (23 Nov 2018 12:37)  T(F): 98.3 (23 Nov 2018 12:37), Max: 98.3 (23 Nov 2018 12:37)  HR: 74 (23 Nov 2018 12:37) (68 - 77)  BP: 149/60 (23 Nov 2018 12:37) (102/41 - 149/60)  RR: 16 (23 Nov 2018 12:37) (16 - 17)  SpO2: 98% (23 Nov 2018 12:37) (96% - 98%)      REVIEW OF SYSTEMS:  All other review of systems is negative unless indicated above.      PHYSICAL EXAM:    General: Well developed; well nourished; in no acute distress  Eyes: PERRLA, EOMI; conjunctiva and sclera clear  Head: Normocephalic; atraumatic  ENMT: No nasal discharge; airway clear  Neck: Supple; non tender; no masses  Respiratory: Good air entry. No wheezes, rales or rhonchi  Cardiovascular: Regular rate and rhythm. S1 and S2 Normal; No murmurs  Gastrointestinal: Soft non-tender non-distended; Normal bowel sounds  Genitourinary: No costovertebral angle tenderness  Extremities: Preserved range of motion, No edema. LLE with dressing D/C/I   Vascular: Peripheral pulses palpable 2+ bilaterally  Neurological: Alert and oriented x2, non focal   Skin: Warm and dry. No acute rash  Lymph Nodes: No acute cervical adenopathy  Musculoskeletal: Normal muscle , tone, without deformities  Psychiatric: Cooperative and appropriate    LABS:     23 Nov 2018 04:36    143    |  107    |  27     ----------------------------<  109    4.4     |  30     |  0.80     Ca    8.1        23 Nov 2018 04:36        MEDICATIONS  (STANDING):  acetaminophen   Tablet .. 650 milliGRAM(s) Oral every 8 hours  carvedilol 6.25 milliGRAM(s) Oral every 12 hours  cefTRIAXone Injectable. 1000 milliGRAM(s) IV Push every 24 hours  cholecalciferol 400 Unit(s) Oral daily  donepezil 10 milliGRAM(s) Oral daily  enoxaparin Injectable 40 milliGRAM(s) SubCutaneous every 24 hours  famotidine    Tablet 40 milliGRAM(s) Oral at bedtime  lactobacillus acidophilus 1 Tablet(s) Oral daily  oxybutynin 5 milliGRAM(s) Oral two times a day  pantoprazole    Tablet 40 milliGRAM(s) Oral before breakfast  pyridoxine 100 milliGRAM(s) Oral daily  simvastatin 20 milliGRAM(s) Oral at bedtime  vancomycin  IVPB 750 milliGRAM(s) IV Intermittent every 12 hours    MEDICATIONS  (PRN):  acetaminophen   Tablet .. 650 milliGRAM(s) Oral every 6 hours PRN Temp greater or equal to 38C (100.4F), Moderate Pain (4 - 6)  aluminum hydroxide/magnesium hydroxide/simethicone Suspension 30 milliLiter(s) Oral every 4 hours PRN Dyspepsia  ondansetron Injectable 4 milliGRAM(s) IV Push every 6 hours PRN Nausea      RADIOLOGY & ADDITIONAL TESTS:

## 2018-11-23 NOTE — PROGRESS NOTE ADULT - ASSESSMENT
89 y/o Female with h/o CAD, Lung Ca s/p RT, HLD, GERD, overactive bladder was admitted on 11/20 for lower right leg pain and redness x one day at a site of prior leg laceration.  Pt was seen in ED in 09/2018 with laceration to right shin s/p slip and fall and had 19 stitches placed which have since been removed. The right shin wound never healed completely; she had a persistent small opening on anterior shin area. The night PTA, pt developed erythema and pain to site of her RLE wound. Seen by PMD Dr. Cuevas and advised pt to come into ED for evaluation. In ER, she received clindamycin.    1. Right anterior shin cellulitis with open ulcer.   -right lower leg pain is improving slowly  -would still with scant drainage  -MRI reviewed - no evidence of OM.  -concerned that her leg wound did not completely heal after her traumatic event more than 2 month ago  -wound c/s was not collected, even though order was placed and RN was told to collect culture  -on vancomycin 750 mg IV q12h and ceftriaxone 1 gm IV qd # 3  -tolerating abx well so far; no side effects noted  -vancomycin trough level is therapeutic  -continue IV abx coverage for now  -local wound care  -monitor temps  -f/u CBC  -supportive care  2. Other issues:   -care per medicine

## 2018-11-23 NOTE — PROGRESS NOTE ADULT - SUBJECTIVE AND OBJECTIVE BOX
Date of service: 18 @ 14:43    OOB to chair  Right lower leg feels less swollen  No new complaints    ROS: no fever or chills; denies dizziness, no HA, no SOB or cough, no abdominal pain, no diarrhea or constipation; no dysuria, no legs pain, no new rashes    MEDICATIONS  (STANDING):  acetaminophen   Tablet .. 650 milliGRAM(s) Oral every 8 hours  carvedilol 6.25 milliGRAM(s) Oral every 12 hours  cefTRIAXone Injectable. 1000 milliGRAM(s) IV Push every 24 hours  cholecalciferol 400 Unit(s) Oral daily  donepezil 10 milliGRAM(s) Oral daily  enoxaparin Injectable 40 milliGRAM(s) SubCutaneous every 24 hours  famotidine    Tablet 40 milliGRAM(s) Oral at bedtime  lactobacillus acidophilus 1 Tablet(s) Oral daily  oxybutynin 5 milliGRAM(s) Oral two times a day  pantoprazole    Tablet 40 milliGRAM(s) Oral before breakfast  pyridoxine 100 milliGRAM(s) Oral daily  simvastatin 20 milliGRAM(s) Oral at bedtime  vancomycin  IVPB 750 milliGRAM(s) IV Intermittent every 12 hours      Vital Signs Last 24 Hrs  T(C): 36.8 (2018 12:37), Max: 36.8 (2018 12:37)  T(F): 98.3 (2018 12:37), Max: 98.3 (2018 12:37)  HR: 74 (2018 12:37) (68 - 77)  BP: 149/60 (2018 12:37) (102/41 - 149/60)  BP(mean): --  RR: 16 (2018 12:37) (16 - 17)  SpO2: 98% (2018 12:37) (96% - 98%)    Physical Exam:      Constitutional: frail looking  HEENT: NC/AT, EOMI, PERRLA, conjunctivae clear  Neck: supple; thyroid not palpable  Back: no tenderness  Respiratory: respiratory effort normal; clear to auscultation  Cardiovascular: S1S2 regular, no murmurs  Abdomen: soft, not tender, not distended, positive BS  Genitourinary: no suprapubic tenderness  Lymphatic: no LN palpable  Musculoskeletal: no muscle tenderness, no joint swelling or tenderness  Right shin small open ulcer with scant serous discharge; surrounding erythema and edema - improving  Extremities: no pedal edema  Neurological/ Psychiatric: AxOx3, moving all extremities  Skin: no rashes; no palpable lesions    Labs: reviewed        143  |  107  |  27<H>  ----------------------------<  109<H>  4.4   |  30  |  0.80    Ca    8.1<L>      2018 04:36      Vancomycin Level, Trough: 13.3 ug/mL ( @ 04:36)                          12.4   10.06 )-----------( 179      ( 2018 07:24 )             36.8         141  |  106  |  26<H>  ----------------------------<  97  4.0   |  25  |  0.60    Ca    8.0<L>      2018 07:24  Phos  3.1       Mg     2.0         TPro  5.9<L>  /  Alb  2.7<L>  /  TBili  0.5  /  DBili  x   /  AST  23  /  ALT  39  /  AlkPhos  39<L>       LIVER FUNCTIONS - ( 2018 07:24 )  Alb: 2.7 g/dL / Pro: 5.9 gm/dL / ALK PHOS: 39 U/L / ALT: 39 U/L / AST: 23 U/L / GGT: x           Urinalysis Basic - ( 2018 07:00 )    Color: Yellow / Appearance: Clear / S.020 / pH: x  Gluc: x / Ketone: Negative  / Bili: Negative / Urobili: Negative mg/dL   Blood: x / Protein: Negative mg/dL / Nitrite: Negative   Leuk Esterase: Negative / RBC: x / WBC x   Sq Epi: x / Non Sq Epi: x / Bacteria: x      Culture - Blood (collected 2018 16:46)  Source: .Blood None  Preliminary Report (2018 22:01):    No growth to date.    Culture - Blood (collected 2018 16:46)  Source: .Blood None  Preliminary Report (2018 22:01):    No growth to date.    Culture - Urine (collected 2018 07:00)  Source: .Urine Clean Catch (Midstream)  Final Report (2018 08:51):    No growth          Radiology: all available radiological tests reviewed    < from: MR Lower Ext Non-joint No Cont, Left (18 @ 21:54) >   Open wound anterior to the midshaft of the tibia with   adjacent soft tissue edema and mild intramuscular edema within the   anterior muscle compartment. There is no soft tissue or intramuscular   abscess. These findings are compatible with cellulitis and reactive   myositis.  There is no evidence of osteomyelitis.    < end of copied text >      Advanced directives addressed: full resuscitation

## 2018-11-23 NOTE — PROGRESS NOTE ADULT - ASSESSMENT
88 y/o F with PMHx of CAD, h/o right sided Lung ca s/p RT , HLD, and GERD, CBP, overactive bladder presenting to the  with 1 days history of  lower right leg pain and redness .  Pt was seen in ED in 09/2018 with laceration to right shin s/p slip and fall. 19 stitches were placed which have since been removed. Last night, pt started to experience erythema and pain to site of wound on RLE. Seen by PMD Dr. Cuevas today who advised pt to come into ED for evaluation, pt was taking abx for recently dx UTI.  No falls or other trauma to the leg since original injury in September. Denies any abd pain, nausea, vomiting, but has poor appetite, no fevers, chills, no CP, SOB, numbness, or weakness. Patient reports occasional dry cough and some lung sounds on and off in last couple of weeks.    in ED - T(F): 98, Max: 98.2 HR: 67 ( (67 - 77) BP: 134/67  (134/67 - 147/72) RR: 19 (19 - 19) SpO2: 100%  (100% - 100%)WBC 13, BUN 32, Cr 0.68 EKG - sinus , RBBB, no ischemic changes, CXR - no infiltrates, s/p Clindamycin in ED     * RLE erythema and pain suspected acute cellulitis r/o DVT  - X ray - no fx  - doppler - neg for DVT  - s/p Clinda in ED, c/w IV Ceftriaxone and Vanco per day #2.   - f/u wound cultures , neg urine ctx.   - ID consult appreciated: MRI without evidence of OM  - local wound care  - elevation  - NEED WOUND CTX and results prior to discharge.     * cough h/o lung CA s/p RT  - CXR - clear  - follows with Dr. Giron for serial CT , next schedule for december    * HTN  - c/w coreg    * HLD   - c/w statins    * overactive bladder, recent UTI  - c/w oxybutinin  - f/u UA, urine cx, completed 5/7 days of Macrobid.   - ceftriaxone will cover UTI     * Dementia  - c/w aricept    * GERD  - c/w PPI and h2 blocker    Advanced directives  - FULL code    DVT proph - lovenox 88 y/o F with PMHx of CAD, h/o right sided Lung ca s/p RT , HLD, and GERD, CBP, overactive bladder admitted for:       * RLE erythema and pain suspected acute cellulitis r/o DVT  - X ray - no fx  - doppler - neg for DVT  - s/p Clinda in ED, c/w IV Ceftriaxone and Vanco per day #3.   - f/u wound cultures , neg urine and Blood Cx MRI without evidence of OM  - local wound care  - elevation  - D/w DR Dacosta     * cough h/o lung CA s/p RT  - CXR - clear  - follows with Dr. Giron for serial CT , next schedule for december    * HTN  - c/w coreg    * HLD   - c/w statins    * overactive bladder, recent UTI  - c/w Oxybutynin  - urine cx : NG,  completed 5/7 days of Macrobid.   - ceftriaxone will cover UTI     * Dementia  - c/w aricept    * GERD  - c/w PPI and h2 blocker    Advanced directives  - FULL code    DVT proph - lovenox

## 2018-11-24 RX ORDER — CEFEPIME 1 G/1
2000 INJECTION, POWDER, FOR SOLUTION INTRAMUSCULAR; INTRAVENOUS EVERY 12 HOURS
Qty: 0 | Refills: 0 | Status: DISCONTINUED | OUTPATIENT
Start: 2018-11-24 | End: 2018-11-25

## 2018-11-24 RX ADMIN — Medication 650 MILLIGRAM(S): at 07:09

## 2018-11-24 RX ADMIN — Medication 650 MILLIGRAM(S): at 06:24

## 2018-11-24 RX ADMIN — Medication 250 MILLIGRAM(S): at 18:33

## 2018-11-24 RX ADMIN — Medication 650 MILLIGRAM(S): at 21:31

## 2018-11-24 RX ADMIN — Medication 3 MILLIGRAM(S): at 23:00

## 2018-11-24 RX ADMIN — CARVEDILOL PHOSPHATE 6.25 MILLIGRAM(S): 80 CAPSULE, EXTENDED RELEASE ORAL at 18:33

## 2018-11-24 RX ADMIN — Medication 400 UNIT(S): at 12:00

## 2018-11-24 RX ADMIN — CEFEPIME 100 MILLIGRAM(S): 1 INJECTION, POWDER, FOR SOLUTION INTRAMUSCULAR; INTRAVENOUS at 14:43

## 2018-11-24 RX ADMIN — CEFEPIME 100 MILLIGRAM(S): 1 INJECTION, POWDER, FOR SOLUTION INTRAMUSCULAR; INTRAVENOUS at 18:33

## 2018-11-24 RX ADMIN — Medication 1 TABLET(S): at 12:00

## 2018-11-24 RX ADMIN — Medication 650 MILLIGRAM(S): at 22:01

## 2018-11-24 RX ADMIN — ENOXAPARIN SODIUM 40 MILLIGRAM(S): 100 INJECTION SUBCUTANEOUS at 22:59

## 2018-11-24 RX ADMIN — Medication 250 MILLIGRAM(S): at 06:14

## 2018-11-24 RX ADMIN — PANTOPRAZOLE SODIUM 40 MILLIGRAM(S): 20 TABLET, DELAYED RELEASE ORAL at 06:15

## 2018-11-24 RX ADMIN — Medication 650 MILLIGRAM(S): at 13:34

## 2018-11-24 RX ADMIN — Medication 5 MILLIGRAM(S): at 18:33

## 2018-11-24 RX ADMIN — SIMVASTATIN 20 MILLIGRAM(S): 20 TABLET, FILM COATED ORAL at 21:31

## 2018-11-24 RX ADMIN — Medication 5 MILLIGRAM(S): at 06:15

## 2018-11-24 RX ADMIN — CARVEDILOL PHOSPHATE 6.25 MILLIGRAM(S): 80 CAPSULE, EXTENDED RELEASE ORAL at 06:15

## 2018-11-24 RX ADMIN — FAMOTIDINE 40 MILLIGRAM(S): 10 INJECTION INTRAVENOUS at 21:30

## 2018-11-24 RX ADMIN — Medication 100 MILLIGRAM(S): at 12:00

## 2018-11-24 RX ADMIN — DONEPEZIL HYDROCHLORIDE 10 MILLIGRAM(S): 10 TABLET, FILM COATED ORAL at 12:00

## 2018-11-24 NOTE — PROGRESS NOTE ADULT - ASSESSMENT
89 y/o F with PMHx of CAD, h/o right sided Lung ca s/p RT , HLD, and GERD, CBP, overactive bladder admitted for:       * RLE erythema and pain suspected acute cellulitis r/o DVT  - X ray - no fx  - doppler - neg for DVT  - s/p Clinda in ED, c/w IV Ceftriaxone and Vanco per day #3.   - f/u wound cultures , neg urine and Blood Cx MRI without evidence of OM  - local wound care  - elevation  - D/w DR Dacosta     * cough h/o lung CA s/p RT  - CXR - clear  - follows with Dr. Giron for serial CT , next schedule for december    * HTN  - c/w coreg    * HLD   - c/w statins    * overactive bladder, recent UTI  - c/w Oxybutynin  - urine cx : NG,  completed 5/7 days of Macrobid.   - ceftriaxone will cover UTI     * Dementia  - c/w aricept    * GERD  - c/w PPI and h2 blocker    Advanced directives  - FULL code    DVT proph - lovenox 89 y/o F with PMHx of CAD, h/o right sided Lung ca s/p RT , HLD, and GERD, CBP, overactive bladder admitted for:       * RLE erythema and pain suspected acute cellulitis   - X ray - no fx  - doppler - neg for DVT  - MRI: neg for OM   - s/p Clinda in ED, on IV Ceftriaxone and Vanco per day #4.   - f/u wound cultures: + PSAE  -  neg urine and Blood Cx   - C/w local wound care  - elevation  - D/w DR Dacosta results of CX, switch ABXS to Cefepime, d/c Vanco     * cough h/o lung CA s/p RT  - CXR - clear  - follows with Dr. Giron for serial CT , next schedule for december    * HTN  - c/w coreg    * HLD   - c/w statins    * overactive bladder, recent UTI  - c/w Oxybutynin  - urine cx : NG,  completed 5/7 days of Macrobid.   - ceftriaxone will cover UTI     * Dementia  - c/w aricept    * GERD  - c/w PPI and h2 blocker    Advanced directives  - FULL code    DVT proph - lovenox    Dispo: C/w IV Abxs, f/u final wound Cx results

## 2018-11-24 NOTE — PROGRESS NOTE ADULT - SUBJECTIVE AND OBJECTIVE BOX
CC: right leg pain redness and swelling (22 Nov 2018 11:01)    HPI:  89 y/o F with PMHx of CAD, h/o right sided Lung ca s/p RT, HLD, and GERD, CBP, overactive bladder presenting to the  with 1 days history of  lower right leg pain and redness .  Pt was seen in ED in 09/2018 with laceration to right shin s/p slip and fall. 19 stitches were placed which have since been removed. Last night, pt started to experience erythema and pain to site of wound on RLE. Seen by PMD Dr. Cuevas today who advised pt to come into ED for evaluation, pt was taking abx for recently dx UTI.  No falls or other trauma to the leg since original injury in September. Denies any abd pain, nausea, vomiting, but has poor appetite, no fevers, chills, no CP, SOB, numbness, or weakness. Patient reports occasional dry cough and some lung sounds on and off in last couple of weeks.    in ED - T(F): 98, Max: 98.2 HR: 67 ( (67 - 77) BP: 134/67  (134/67 - 147/72) RR: 19 (19 - 19) SpO2: 100%  (100% - 100%)WBC 13, BUN 32, Cr 0.68 EKG - sinus , RBBB, no ischemic changes, CXR - no infiltrates, s/p Clindamycin in ED (20 Nov 2018 20:31)    INTERVAL HPI/ OVERNIGHT EVENTS: Chart reviewed, Pt was seen and examined,  OOB to chair, comfortable,  mildly confused. Has no complains, states that leg is better. Daughter in law at bedside. POC discussed       Vital Signs Last 24 Hrs  T(C): 36.6 (24 Nov 2018 11:22), Max: 36.8 (23 Nov 2018 20:44)  T(F): 97.9 (24 Nov 2018 11:22), Max: 98.2 (23 Nov 2018 20:44)  HR: 93 (24 Nov 2018 11:22) (77 - 93)  BP: 147/71 (24 Nov 2018 11:22) (115/43 - 147/71)    RR: 16 (24 Nov 2018 11:22) (16 - 18)  SpO2: 97% (24 Nov 2018 11:22) (97% - 100%)      REVIEW OF SYSTEMS:  All other review of systems is negative unless indicated above.      PHYSICAL EXAM:    General: Well developed; well nourished; in no acute distress  Eyes: PERRLA, EOMI; conjunctiva and sclera clear  Head: Normocephalic; atraumatic  ENMT: No nasal discharge; airway clear  Neck: Supple; non tender; no masses  Respiratory: Good air entry. No wheezes, rales or rhonchi  Cardiovascular: Regular rate and rhythm. S1 and S2 Normal; No murmurs  Gastrointestinal: Soft non-tender non-distended; Normal bowel sounds  Genitourinary: No costovertebral angle tenderness  Extremities: Preserved range of motion, No edema. LLE with dressing D/C/I   Vascular: Peripheral pulses palpable 2+ bilaterally  Neurological: Alert and oriented x2, non focal   Skin: Warm and dry. No acute rash  Lymph Nodes: No acute cervical adenopathy  Musculoskeletal: Normal muscle , tone, without deformities  Psychiatric: Cooperative and appropriate    LABS:     23 Nov 2018 04:36    143    |  107    |  27     ----------------------------<  109    4.4     |  30     |  0.80     Ca    8.1        23 Nov 2018 04:36        MEDICATIONS  (STANDING):  acetaminophen   Tablet .. 650 milliGRAM(s) Oral every 8 hours  carvedilol 6.25 milliGRAM(s) Oral every 12 hours  cefTRIAXone Injectable. 1000 milliGRAM(s) IV Push every 24 hours  cholecalciferol 400 Unit(s) Oral daily  donepezil 10 milliGRAM(s) Oral daily  enoxaparin Injectable 40 milliGRAM(s) SubCutaneous every 24 hours  famotidine    Tablet 40 milliGRAM(s) Oral at bedtime  lactobacillus acidophilus 1 Tablet(s) Oral daily  oxybutynin 5 milliGRAM(s) Oral two times a day  pantoprazole    Tablet 40 milliGRAM(s) Oral before breakfast  pyridoxine 100 milliGRAM(s) Oral daily  simvastatin 20 milliGRAM(s) Oral at bedtime  vancomycin  IVPB 750 milliGRAM(s) IV Intermittent every 12 hours    MEDICATIONS  (PRN):  acetaminophen   Tablet .. 650 milliGRAM(s) Oral every 6 hours PRN Temp greater or equal to 38C (100.4F), Moderate Pain (4 - 6)  aluminum hydroxide/magnesium hydroxide/simethicone Suspension 30 milliLiter(s) Oral every 4 hours PRN Dyspepsia  ondansetron Injectable 4 milliGRAM(s) IV Push every 6 hours PRN Nausea      RADIOLOGY & ADDITIONAL TESTS: CC: right leg pain redness and swelling (22 Nov 2018 11:01)    HPI:  89 y/o F with PMHx of CAD, h/o right sided Lung ca s/p RT, HLD, and GERD, CBP, overactive bladder presenting to the  with 1 days history of  lower right leg pain and redness .  Pt was seen in ED in 09/2018 with laceration to right shin s/p slip and fall. 19 stitches were placed which have since been removed. Last night, pt started to experience erythema and pain to site of wound on RLE. Seen by PMD Dr. Cuevas today who advised pt to come into ED for evaluation, pt was taking abx for recently dx UTI.  No falls or other trauma to the leg since original injury in September. Denies any abd pain, nausea, vomiting, but has poor appetite, no fevers, chills, no CP, SOB, numbness, or weakness. Patient reports occasional dry cough and some lung sounds on and off in last couple of weeks.    in ED - T(F): 98, Max: 98.2 HR: 67 ( (67 - 77) BP: 134/67  (134/67 - 147/72) RR: 19 (19 - 19) SpO2: 100%  (100% - 100%)WBC 13, BUN 32, Cr 0.68 EKG - sinus , RBBB, no ischemic changes, CXR - no infiltrates, s/p Clindamycin in ED (20 Nov 2018 20:31)    INTERVAL HPI/ OVERNIGHT EVENTS: Pt was seen and examined,  OOB to chair,  reports no complains.  Pt upset that still in the hospital. Results of Cx and plan discuss.     Vital Signs Last 24 Hrs  T(C): 36.6 (24 Nov 2018 11:22), Max: 36.8 (23 Nov 2018 20:44)  T(F): 97.9 (24 Nov 2018 11:22), Max: 98.2 (23 Nov 2018 20:44)  HR: 93 (24 Nov 2018 11:22) (77 - 93)  BP: 147/71 (24 Nov 2018 11:22) (115/43 - 147/71)  RR: 16 (24 Nov 2018 11:22) (16 - 18)  SpO2: 97% (24 Nov 2018 11:22) (97% - 100%)      REVIEW OF SYSTEMS:  All other review of systems is negative unless indicated above.      PHYSICAL EXAM:    General: Well developed; well nourished; in no acute distress  Eyes: PERRLA, EOMI; conjunctiva and sclera clear  Head: Normocephalic; atraumatic  ENMT: No nasal discharge; airway clear  Neck: Supple; non tender; no masses  Respiratory: Good air entry. No wheezes, rales or rhonchi  Cardiovascular: Regular rate and rhythm. S1 and S2 Normal; No murmurs  Gastrointestinal: Soft non-tender non-distended; Normal bowel sounds  Genitourinary: No costovertebral angle tenderness  Extremities: Preserved range of motion, No edema. LLE with area of edema and mild erythema, scar with opening, + purulent draining   Vascular: Peripheral pulses palpable 2+ bilaterally  Neurological: Alert and oriented x2, non focal   Skin: Warm and dry. No acute rash  Lymph Nodes: No acute cervical adenopathy  Musculoskeletal: Normal muscle , tone, without deformities  Psychiatric: Cooperative and appropriate    LABS:     11-23    143  |  107  |  27<H>  ----------------------------<  109<H>  4.4   |  30  |  0.80    Ca    8.1<L>      23 Nov 2018 04:36          23 Nov 2018 04:36    143    |  107    |  27     ----------------------------<  109    4.4     |  30     |  0.80     Ca    8.1        23 Nov 2018 04:36    Culture - Abscess with Gram Stain (11.21.18 @ 05:45)    Specimen Source: .Abscess right shin/COPAN    Culture Results:   Moderate Pseudomonas aeruginosa        MEDICATIONS  (STANDING):  acetaminophen   Tablet .. 650 milliGRAM(s) Oral every 8 hours  carvedilol 6.25 milliGRAM(s) Oral every 12 hours  cefepime   IVPB 2000 milliGRAM(s) IV Intermittent every 12 hours  cholecalciferol 400 Unit(s) Oral daily  donepezil 10 milliGRAM(s) Oral daily  enoxaparin Injectable 40 milliGRAM(s) SubCutaneous every 24 hours  famotidine    Tablet 40 milliGRAM(s) Oral at bedtime  lactobacillus acidophilus 1 Tablet(s) Oral daily  oxybutynin 5 milliGRAM(s) Oral two times a day  pantoprazole    Tablet 40 milliGRAM(s) Oral before breakfast  pyridoxine 100 milliGRAM(s) Oral daily  simvastatin 20 milliGRAM(s) Oral at bedtime  vancomycin  IVPB 750 milliGRAM(s) IV Intermittent every 12 hours    MEDICATIONS  (PRN):  acetaminophen   Tablet .. 650 milliGRAM(s) Oral every 6 hours PRN Temp greater or equal to 38C (100.4F), Moderate Pain (4 - 6)  aluminum hydroxide/magnesium hydroxide/simethicone Suspension 30 milliLiter(s) Oral every 4 hours PRN Dyspepsia  melatonin 3 milliGRAM(s) Oral at bedtime PRN Insomnia  ondansetron Injectable 4 milliGRAM(s) IV Push every 6 hours PRN Nausea        RADIOLOGY & ADDITIONAL TESTS:

## 2018-11-24 NOTE — PROGRESS NOTE ADULT - ASSESSMENT
88 y/o Female with h/o CAD, Lung Ca s/p RT, HLD, GERD, overactive bladder was admitted on 11/20 for lower right leg pain and redness x one day at a site of prior leg laceration.  Pt was seen in ED in 09/2018 with laceration to right shin s/p slip and fall and had 19 stitches placed which have since been removed. The right shin wound never healed completely; she had a persistent small opening on anterior shin area. The night PTA, pt developed erythema and pain to site of her RLE wound. Seen by PMD Dr. Cuevas and advised pt to come into ED for evaluation. In ER, she received clindamycin.    1. Right anterior shin cellulitis with open ulcer with PSAE.   -right lower leg pain is improving slowly  -wound still with scant drainage  -MRI reviewed - no evidence of OM.  -concerned that her leg wound did not completely heal after her traumatic event more than 2 month ago  -wound c/s was not collected, even though order was placed and RN was told to collect culture  -on vancomycin 750 mg IV q12h and ceftriaxone 1 gm IV qd # 4  -tolerating abx well so far; no side effects noted  -d/c ceftriaxone   -start cefepime 2 gm IV q12h  -continue IV abx coverage   -local wound care  -monitor temps  -f/u CBC  -supportive care  2. Other issues:   -care per medicine

## 2018-11-24 NOTE — PROGRESS NOTE ADULT - SUBJECTIVE AND OBJECTIVE BOX
Date of service: 18 @ 14:12    OOB to chair  Right shin area is tender and scant drainage from small wound opening    ROS: no fever or chills; denies dizziness, no HA, no SOB or cough, no abdominal pain, no diarrhea or constipation; no dysuria, no legs pain    MEDICATIONS  (STANDING):  acetaminophen   Tablet .. 650 milliGRAM(s) Oral every 8 hours  carvedilol 6.25 milliGRAM(s) Oral every 12 hours  cefepime   IVPB 2000 milliGRAM(s) IV Intermittent every 12 hours  cholecalciferol 400 Unit(s) Oral daily  donepezil 10 milliGRAM(s) Oral daily  enoxaparin Injectable 40 milliGRAM(s) SubCutaneous every 24 hours  famotidine    Tablet 40 milliGRAM(s) Oral at bedtime  lactobacillus acidophilus 1 Tablet(s) Oral daily  oxybutynin 5 milliGRAM(s) Oral two times a day  pantoprazole    Tablet 40 milliGRAM(s) Oral before breakfast  pyridoxine 100 milliGRAM(s) Oral daily  simvastatin 20 milliGRAM(s) Oral at bedtime  vancomycin  IVPB 750 milliGRAM(s) IV Intermittent every 12 hours      Vital Signs Last 24 Hrs  T(C): 36.6 (2018 11:22), Max: 36.8 (2018 20:44)  T(F): 97.9 (2018 11:22), Max: 98.2 (2018 20:44)  HR: 93 (2018 11:22) (77 - 93)  BP: 147/71 (2018 11:22) (115/43 - 147/71)  BP(mean): --  RR: 16 (2018 11:22) (16 - 18)  SpO2: 97% (2018 11:22) (97% - 100%)    Physical Exam:      Constitutional: frail looking  HEENT: NC/AT, EOMI, PERRLA, conjunctivae clear  Neck: supple; thyroid not palpable  Back: no tenderness  Respiratory: respiratory effort normal; clear to auscultation  Cardiovascular: S1S2 regular, no murmurs  Abdomen: soft, not tender, not distended, positive BS  Genitourinary: no suprapubic tenderness  Lymphatic: no LN palpable  Musculoskeletal: no muscle tenderness, no joint swelling or tenderness  Right shin small open ulcer with scant serous discharge; surrounding erythema and edema - improving  Extremities: no pedal edema  Neurological/ Psychiatric: AxOx3, moving all extremities  Skin: no rashes; no palpable lesions    Labs: reviewed        143  |  107  |  27<H>  ----------------------------<  109<H>  4.4   |  30  |  0.80    Ca    8.1<L>      2018 04:36      Vancomycin Level, Trough: 13.3 ug/mL ( @ 04:36)                          12.4   10.06 )-----------( 179      ( 2018 07:24 )             36.8         141  |  106  |  26<H>  ----------------------------<  97  4.0   |  25  |  0.60    Ca    8.0<L>      2018 07:24  Phos  3.1       Mg     2.0         TPro  5.9<L>  /  Alb  2.7<L>  /  TBili  0.5  /  DBili  x   /  AST  23  /  ALT  39  /  AlkPhos  39<L>       LIVER FUNCTIONS - ( 2018 07:24 )  Alb: 2.7 g/dL / Pro: 5.9 gm/dL / ALK PHOS: 39 U/L / ALT: 39 U/L / AST: 23 U/L / GGT: x           Urinalysis Basic - ( 2018 07:00 )    Color: Yellow / Appearance: Clear / S.020 / pH: x  Gluc: x / Ketone: Negative  / Bili: Negative / Urobili: Negative mg/dL   Blood: x / Protein: Negative mg/dL / Nitrite: Negative   Leuk Esterase: Negative / RBC: x / WBC x   Sq Epi: x / Non Sq Epi: x / Bacteria: x      Culture - Blood (collected 2018 16:46)  Source: .Blood None  Preliminary Report (2018 22:01):    No growth to date.    Culture - Blood (collected 2018 16:46)  Source: .Blood None  Preliminary Report (2018 22:01):    No growth to date.    Culture - Urine (collected 2018 07:00)  Source: .Urine Clean Catch (Midstream)  Final Report (2018 08:51):    No growth    Culture - Abscess with Gram Stain (18 @ 05:45)    Specimen Source: .Abscess right shin/COPAN    Culture Results:   Moderate Pseudomonas aeruginosa          Radiology: all available radiological tests reviewed    < from: MR Lower Ext Non-joint No Cont, Left (18 @ 21:54) >   Open wound anterior to the midshaft of the tibia with   adjacent soft tissue edema and mild intramuscular edema within the   anterior muscle compartment. There is no soft tissue or intramuscular   abscess. These findings are compatible with cellulitis and reactive   myositis.  There is no evidence of osteomyelitis.    < end of copied text >      Advanced directives addressed: full resuscitation

## 2018-11-25 ENCOUNTER — TRANSCRIPTION ENCOUNTER (OUTPATIENT)
Age: 83
End: 2018-11-25

## 2018-11-25 VITALS
RESPIRATION RATE: 16 BRPM | OXYGEN SATURATION: 97 % | TEMPERATURE: 98 F | HEART RATE: 69 BPM | SYSTOLIC BLOOD PRESSURE: 119 MMHG | DIASTOLIC BLOOD PRESSURE: 65 MMHG

## 2018-11-25 LAB
-  AMIKACIN: SIGNIFICANT CHANGE UP
-  AZTREONAM: SIGNIFICANT CHANGE UP
-  CEFEPIME: SIGNIFICANT CHANGE UP
-  CEFTAZIDIME: SIGNIFICANT CHANGE UP
-  CIPROFLOXACIN: SIGNIFICANT CHANGE UP
-  GENTAMICIN: SIGNIFICANT CHANGE UP
-  IMIPENEM: SIGNIFICANT CHANGE UP
-  LEVOFLOXACIN: SIGNIFICANT CHANGE UP
-  MEROPENEM: SIGNIFICANT CHANGE UP
-  PIPERACILLIN/TAZOBACTAM: SIGNIFICANT CHANGE UP
-  TOBRAMYCIN: SIGNIFICANT CHANGE UP
CULTURE RESULTS: SIGNIFICANT CHANGE UP
CULTURE RESULTS: SIGNIFICANT CHANGE UP
METHOD TYPE: SIGNIFICANT CHANGE UP
SPECIMEN SOURCE: SIGNIFICANT CHANGE UP
SPECIMEN SOURCE: SIGNIFICANT CHANGE UP

## 2018-11-25 RX ORDER — MOXIFLOXACIN HYDROCHLORIDE TABLETS, 400 MG 400 MG/1
1 TABLET, FILM COATED ORAL
Qty: 20 | Refills: 0
Start: 2018-11-25 | End: 2018-12-04

## 2018-11-25 RX ORDER — PANTOPRAZOLE SODIUM 20 MG/1
1 TABLET, DELAYED RELEASE ORAL
Qty: 0 | Refills: 0 | DISCHARGE
Start: 2018-11-25

## 2018-11-25 RX ORDER — PANTOPRAZOLE SODIUM 20 MG/1
1 TABLET, DELAYED RELEASE ORAL
Qty: 0 | Refills: 0 | COMMUNITY

## 2018-11-25 RX ADMIN — Medication 650 MILLIGRAM(S): at 13:35

## 2018-11-25 RX ADMIN — PANTOPRAZOLE SODIUM 40 MILLIGRAM(S): 20 TABLET, DELAYED RELEASE ORAL at 05:31

## 2018-11-25 RX ADMIN — Medication 400 UNIT(S): at 12:11

## 2018-11-25 RX ADMIN — CEFEPIME 100 MILLIGRAM(S): 1 INJECTION, POWDER, FOR SOLUTION INTRAMUSCULAR; INTRAVENOUS at 05:32

## 2018-11-25 RX ADMIN — Medication 1 TABLET(S): at 12:11

## 2018-11-25 RX ADMIN — Medication 100 MILLIGRAM(S): at 12:11

## 2018-11-25 RX ADMIN — Medication 650 MILLIGRAM(S): at 05:31

## 2018-11-25 RX ADMIN — Medication 5 MILLIGRAM(S): at 05:30

## 2018-11-25 RX ADMIN — Medication 250 MILLIGRAM(S): at 06:28

## 2018-11-25 RX ADMIN — CARVEDILOL PHOSPHATE 6.25 MILLIGRAM(S): 80 CAPSULE, EXTENDED RELEASE ORAL at 05:31

## 2018-11-25 RX ADMIN — DONEPEZIL HYDROCHLORIDE 10 MILLIGRAM(S): 10 TABLET, FILM COATED ORAL at 12:11

## 2018-11-25 NOTE — DISCHARGE NOTE ADULT - HOSPITAL COURSE
89 y/o F with PMHx of CAD, h/o right sided Lung ca s/p RT, HLD, and GERD, CBP, overactive bladder  was sent  to the  by   PCP due to   lower right leg pain and redness and suspected infection.  Pt was seen in ED in 09/2018 with laceration to right shin s/p slip and fall. 19 stitches were placed which have since been removed. pt reports that had part of her wound never closed. Night before admission pt started to experience erythema and pain to site of wound on RLE.  No falls or other trauma to the leg since original injury in September. No reported fevers.   in ED - T(F): 98, Max: 98.2 HR: 67 ( (67 - 77) BP: 134/67  (134/67 - 147/72) RR: 19 (19 - 19) SpO2: 100%  (100% - 100%)WBC 13, BUN 32, Cr 0.68 EKG - sinus , RBBB, no ischemic changes, CXR - no infiltrates, s/p Clindamycin in ED. Pt was started on IV abxs and evaluated by Dr Dacosta. Also BCXwere sent, NG. Wound Cx + Pseudomonas,  Abxs were adjusted. MRI was negative for OM. Pt clinically improved and today reports no complains, anxious to be discharged.  Abxs, follow up and d/c discussed  with Pt and sons at bedside.     Vital Signs Last 24 Hrs  T(C): 36.6 (25 Nov 2018 11:45), Max: 36.8 (24 Nov 2018 21:13)  T(F): 97.9 (25 Nov 2018 11:45), Max: 98.2 (24 Nov 2018 21:13)  HR: 69 (25 Nov 2018 11:45) (69 - 80)  BP: 119/65 (25 Nov 2018 11:45) (115/48 - 151/76)  RR: 16 (25 Nov 2018 11:45) (16 - 16)  SpO2: 97% (25 Nov 2018 11:45) (97% - 98%)    PHYSICAL EXAM:  General: Well developed; well nourished; in no acute distress  Eyes: PERRLA, EOMI; conjunctiva and sclera clear  Head: Normocephalic; atraumatic  ENMT: No nasal discharge; airway clear  Neck: Supple; non tender; no masses  Respiratory: Good air entry. No wheezes, rales or rhonchi  Cardiovascular: Regular rate and rhythm. S1 and S2 Normal; No murmurs  Gastrointestinal: Soft non-tender non-distended; Normal bowel sounds  Genitourinary: No costovertebral angle tenderness  Extremities: Preserved range of motion, No edema. LLE with area of edema and  erythema significantly improved.   Vascular: Peripheral pulses palpable 2+ bilaterally  Neurological: Alert and oriented x2, non focal   Skin: Warm and dry. No acute rash  Lymph Nodes: No acute cervical adenopathy  Musculoskeletal: Normal muscle , tone, without deformities  Psychiatric: Cooperative and appropriate    PLAN:     * RLE erythema and pain suspected acute bacterial  cellulitis due to Pseudomonas   - X ray - no fx  - doppler - neg for DVT  - MRI: neg for OM   - s/p Clinda in ED, was on IV Ceftriaxone and Vanco, was then changed to Cefepime day 2.   - f/u wound cultures: + PSAE, sensitive to Cipro   -  neg urine and Blood Cx   - C/w local wound care  - elevation  - D/w DR Dacosta, will d/c on Cipro x 10 days     * cough h/o lung CA s/p RT  - CXR - clear  - F/u with  Dr. Giron for  upcoming CT , scheduled  for december    * HTN  - Bp stable   - c/w coreg    * HLD   - c/w statins    * overactive bladder, recent UTI  - c/w Oxybutynin  - urine cx : NG,  completed 5/7 days of Macrobid outPt   I     * Dementia  - c/w aricept    * GERD  - c/w PPI and h2 blocker      DVT proph - lovenox    Dispo: stable for d/c home today   Total time 40min   D/c note to be faxed over to PCP

## 2018-11-25 NOTE — PROGRESS NOTE ADULT - ASSESSMENT
89 y/o Female with h/o CAD, Lung Ca s/p RT, HLD, GERD, overactive bladder was admitted on 11/20 for lower right leg pain and redness x one day at a site of prior leg laceration.  Pt was seen in ED in 09/2018 with laceration to right shin s/p slip and fall and had 19 stitches placed which have since been removed. The right shin wound never healed completely; she had a persistent small opening on anterior shin area. The night PTA, pt developed erythema and pain to site of her RLE wound. Seen by PMD Dr. Cuevas and advised pt to come into ED for evaluation. In ER, she received clindamycin.    1. Right anterior shin cellulitis with open ulcer with PSAE.   -right lower leg pain is improving slowly  -wound still with scant drainage  -MRI reviewed - no evidence of OM.  -concerned that her leg wound did not completely heal after her traumatic event more than 2 month ago  -wound c/s was not collected, even though order was placed and RN was told to collect culture  -on vancomycin 750 mg IV q12h # 5 and cefepime # 2  -tolerating abx well so far; no side effects noted  -may change abx to cipro 500 mg PO q12h for 10 more days  -continue IV abx coverage   -local wound care  -f/u with Dr. Cuevas as outpatient  -monitor temps  -f/u CBC  -supportive care  2. Other issues:   -care per medicine

## 2018-11-25 NOTE — PROGRESS NOTE ADULT - REASON FOR ADMISSION
right leg pain redness and swelling

## 2018-11-25 NOTE — DISCHARGE NOTE ADULT - PATIENT PORTAL LINK FT
You can access the [facility], offered by [facility], by registering with the following website: [email or web address]

## 2018-11-25 NOTE — DISCHARGE NOTE ADULT - MEDICATION SUMMARY - MEDICATIONS TO STOP TAKING
I will STOP taking the medications listed below when I get home from the hospital:    Keflex 500 mg oral capsule  -- 1 cap(s) by mouth 3 times a day   -- Finish all this medication unless otherwise directed by prescriber.    nitrofurantoin macrocrystals 100 mg oral capsule  -- 1 cap(s) by mouth 2 times a day  *Started 7 day course on 11/14/18. Not completed

## 2018-11-25 NOTE — DISCHARGE NOTE ADULT - CARE PLAN
Principal Discharge DX:	Cellulitis of lower leg  Goal:	resolve  Assessment and plan of treatment:	Complete oral abxs

## 2018-11-25 NOTE — DISCHARGE NOTE ADULT - CARE PROVIDER_API CALL
Chuck Cuevas), Medicine  [address] [place] [address]  Phone: [phone]  Fax: [phone]    Theo Giron), Internal Medicine; Pulmonary Disease  [address] [place] [address]  Phone: [phone]  Fax: [phone]

## 2018-11-25 NOTE — PROGRESS NOTE ADULT - PROVIDER SPECIALTY LIST ADULT
Hospitalist
Infectious Disease
Hospitalist
Infectious Disease

## 2018-11-25 NOTE — PROGRESS NOTE ADULT - SUBJECTIVE AND OBJECTIVE BOX
Date of service: 18 @ 14:35    OOB to chair  Feel better  Right shin open wound with less discharge and less erythema    ROS: no fever or chills; denies dizziness, no HA, no SOB or cough, no abdominal pain, no diarrhea or constipation; no dysuria, no legs pain, no new rashes    MEDICATIONS  (STANDING):  acetaminophen   Tablet .. 650 milliGRAM(s) Oral every 8 hours  carvedilol 6.25 milliGRAM(s) Oral every 12 hours  cefepime   IVPB 2000 milliGRAM(s) IV Intermittent every 12 hours  cholecalciferol 400 Unit(s) Oral daily  donepezil 10 milliGRAM(s) Oral daily  enoxaparin Injectable 40 milliGRAM(s) SubCutaneous every 24 hours  famotidine    Tablet 40 milliGRAM(s) Oral at bedtime  lactobacillus acidophilus 1 Tablet(s) Oral daily  oxybutynin 5 milliGRAM(s) Oral two times a day  pantoprazole    Tablet 40 milliGRAM(s) Oral before breakfast  pyridoxine 100 milliGRAM(s) Oral daily  simvastatin 20 milliGRAM(s) Oral at bedtime  vancomycin  IVPB 750 milliGRAM(s) IV Intermittent every 12 hours      Vital Signs Last 24 Hrs  T(C): 36.6 (2018 11:45), Max: 36.8 (2018 21:13)  T(F): 97.9 (2018 11:45), Max: 98.2 (2018 21:13)  HR: 69 (2018 11:45) (69 - 80)  BP: 119/65 (2018 11:45) (115/48 - 151/76)  BP(mean): --  RR: 16 (2018 11:45) (16 - 16)  SpO2: 97% (2018 11:45) (97% - 98%)    Physical Exam:      Constitutional: frail looking  HEENT: NC/AT, EOMI, PERRLA, conjunctivae clear  Neck: supple; thyroid not palpable  Back: no tenderness  Respiratory: respiratory effort normal; clear to auscultation  Cardiovascular: S1S2 regular, no murmurs  Abdomen: soft, not tender, not distended, positive BS  Genitourinary: no suprapubic tenderness  Lymphatic: no LN palpable  Musculoskeletal: no muscle tenderness, no joint swelling or tenderness  Right shin small open ulcer with scant serous discharge; surrounding erythema and edema - improving slowly  Extremities: no pedal edema  Neurological/ Psychiatric: AxOx3, moving all extremities  Skin: no rashes; no palpable lesions    Labs: reviewed      Vancomycin Level, Trough: 13.3 ug/mL ( @ 04:36)                          12.4   10.06 )-----------( 179      ( 2018 07:24 )             36.8         141  |  106  |  26<H>  ----------------------------<  97  4.0   |  25  |  0.60    Ca    8.0<L>      2018 07:24  Phos  3.1       Mg     2.0         TPro  5.9<L>  /  Alb  2.7<L>  /  TBili  0.5  /  DBili  x   /  AST  23  /  ALT  39  /  AlkPhos  39<L>       LIVER FUNCTIONS - ( 2018 07:24 )  Alb: 2.7 g/dL / Pro: 5.9 gm/dL / ALK PHOS: 39 U/L / ALT: 39 U/L / AST: 23 U/L / GGT: x           Urinalysis Basic - ( 2018 07:00 )    Color: Yellow / Appearance: Clear / S.020 / pH: x  Gluc: x / Ketone: Negative  / Bili: Negative / Urobili: Negative mg/dL   Blood: x / Protein: Negative mg/dL / Nitrite: Negative   Leuk Esterase: Negative / RBC: x / WBC x   Sq Epi: x / Non Sq Epi: x / Bacteria: x    Culture - Abscess with Gram Stain (collected 2018 05:45)  Source: .Abscess right shin/COPAN  Preliminary Report (2018 10:29):    Moderate Pseudomonas aeruginosa  Organism: Pseudomonas aeruginosa (2018 08:58)  Organism: Pseudomonas aeruginosa (2018 08:58)      -  Amikacin: S <=8      -  Aztreonam: S <=4      -  Cefepime: S <=2      -  Ceftazidime: S 4      -  Ciprofloxacin: S <=0.5      -  Gentamicin: S 4      -  Imipenem: S 2      -  Levofloxacin: S <=1      -  Meropenem: S <=1      -  Piperacillin/Tazobactam: S <=8      -  Tobramycin: S <=2      Method Type: DOUG    Culture - Blood (collected 2018 16:46)  Source: .Blood None  Preliminary Report (2018 22:01):    No growth to date.    Culture - Blood (collected 2018 16:46)  Source: .Blood None  Preliminary Report (2018 22:01):    No growth to date.    Culture - Urine (collected 2018 07:00)  Source: .Urine Clean Catch (Midstream)  Final Report (2018 08:51):    No growth          Radiology: all available radiological tests reviewed    < from: MR Lower Ext Non-joint No Cont, Left (11.21.18 @ 21:54) >   Open wound anterior to the midshaft of the tibia with   adjacent soft tissue edema and mild intramuscular edema within the   anterior muscle compartment. There is no soft tissue or intramuscular   abscess. These findings are compatible with cellulitis and reactive   myositis.  There is no evidence of osteomyelitis.    < end of copied text >      Advanced directives addressed: full resuscitation

## 2018-11-25 NOTE — DISCHARGE NOTE ADULT - MEDICATION SUMMARY - MEDICATIONS TO TAKE
I will START or STAY ON the medications listed below when I get home from the hospital:    Tylenol 8 Hour 650 mg oral tablet, extended release  -- 1 tab(s) by mouth 2 times a day  -- Indication: For pain    Tylenol 325 mg oral tablet  -- 1 tab(s) by mouth every 4 hours, As Needed  -- Indication: For pain    simvastatin 20 mg oral tablet  -- 1 tab(s) by mouth once a day (at bedtime)  -- Indication: For CAD (coronary artery disease)    carvedilol 6.25 mg oral tablet  -- 1 tab(s) by mouth 2 times a day  -- Indication: For CAD (coronary artery disease)    donepezil 10 mg oral tablet  -- 1 tab(s) by mouth once a day  -- Indication: For dementia     famotidine 40 mg oral tablet  -- 1 tab(s) by mouth once a day  -- Indication: For GERD (gastroesophageal reflux disease)    Flax Seed Oil oral capsule  -- 100 milligram(s) by mouth once a day  -- Indication: For Supplement     Fiber Choice 1.5 g oral tablet, chewable  -- 1 tab(s) by mouth once a day  -- Indication: For Constipation     Glucosamine & Chondroitin with MSM oral tablet  -- 1 tab(s) by mouth once a day  -- Indication: For Supplement     Fish Oil 1000 mg oral capsule  -- 1 cap(s) by mouth once a day  -- Indication: For Supplement     Bacid (LAC) oral tablet  -- 1 tab(s) by mouth once a day  -- Indication: For Supplement     pantoprazole 40 mg oral delayed release tablet  -- 1 tab(s) by mouth once a day  -- Indication: For GERD (gastroesophageal reflux disease)    Cipro 500 mg oral tablet  -- 1 tab(s) by mouth 2 times a day   -- Avoid prolonged or excessive exposure to direct and/or artificial sunlight while taking this medication.  Check with your doctor before becoming pregnant.  Do not take dairy products, antacids, or iron preparations within one hour of this medication.  Finish all this medication unless otherwise directed by prescriber.  Medication should be taken with plenty of water.    -- Indication: For Cellulitis of lower leg    oxybutynin 5 mg oral tablet  -- 1 tab(s) by mouth 2 times a day  -- Indication: For Incontonence     Vitamin B6 100 mg oral tablet  -- 1 tab(s) by mouth once a day  -- Indication: For Supplement     Vitamin D3 400 intl units oral tablet  -- 1 tab(s) by mouth once a day  -- Indication: For Supplement     Vitamin C 1000 mg oral tablet  -- 1 tab(s) by mouth once a day  -- Indication: For Supplement     vitamin E 400 intl units oral capsule  -- 1 cap(s) by mouth once a day  -- Indication: For Supplement

## 2018-11-26 PROBLEM — K21.9 GASTRO-ESOPHAGEAL REFLUX DISEASE WITHOUT ESOPHAGITIS: Chronic | Status: ACTIVE | Noted: 2018-11-20

## 2018-11-26 PROBLEM — M54.9 DORSALGIA, UNSPECIFIED: Chronic | Status: ACTIVE | Noted: 2018-11-20

## 2018-11-26 PROBLEM — N32.81 OVERACTIVE BLADDER: Chronic | Status: ACTIVE | Noted: 2018-11-20

## 2018-11-26 PROBLEM — I25.10 ATHEROSCLEROTIC HEART DISEASE OF NATIVE CORONARY ARTERY WITHOUT ANGINA PECTORIS: Chronic | Status: ACTIVE | Noted: 2018-11-21

## 2018-11-26 PROBLEM — C34.90 MALIGNANT NEOPLASM OF UNSPECIFIED PART OF UNSPECIFIED BRONCHUS OR LUNG: Chronic | Status: ACTIVE | Noted: 2018-11-20

## 2018-11-28 DIAGNOSIS — I25.10 ATHEROSCLEROTIC HEART DISEASE OF NATIVE CORONARY ARTERY WITHOUT ANGINA PECTORIS: ICD-10-CM

## 2018-11-28 DIAGNOSIS — E78.5 HYPERLIPIDEMIA, UNSPECIFIED: ICD-10-CM

## 2018-11-28 DIAGNOSIS — N39.0 URINARY TRACT INFECTION, SITE NOT SPECIFIED: ICD-10-CM

## 2018-11-28 DIAGNOSIS — N32.81 OVERACTIVE BLADDER: ICD-10-CM

## 2018-11-28 DIAGNOSIS — F03.90 UNSPECIFIED DEMENTIA WITHOUT BEHAVIORAL DISTURBANCE: ICD-10-CM

## 2018-11-28 DIAGNOSIS — Z85.118 PERSONAL HISTORY OF OTHER MALIGNANT NEOPLASM OF BRONCHUS AND LUNG: ICD-10-CM

## 2018-11-28 DIAGNOSIS — L03.115 CELLULITIS OF RIGHT LOWER LIMB: ICD-10-CM

## 2018-11-28 DIAGNOSIS — Z92.3 PERSONAL HISTORY OF IRRADIATION: ICD-10-CM

## 2018-11-28 DIAGNOSIS — I10 ESSENTIAL (PRIMARY) HYPERTENSION: ICD-10-CM

## 2018-11-28 DIAGNOSIS — K21.9 GASTRO-ESOPHAGEAL REFLUX DISEASE WITHOUT ESOPHAGITIS: ICD-10-CM

## 2018-11-28 DIAGNOSIS — Z88.2 ALLERGY STATUS TO SULFONAMIDES: ICD-10-CM

## 2018-11-28 LAB
CULTURE RESULTS: SIGNIFICANT CHANGE UP
ORGANISM # SPEC MICROSCOPIC CNT: SIGNIFICANT CHANGE UP
ORGANISM # SPEC MICROSCOPIC CNT: SIGNIFICANT CHANGE UP
SPECIMEN SOURCE: SIGNIFICANT CHANGE UP

## 2018-12-03 ENCOUNTER — NON-APPOINTMENT (OUTPATIENT)
Age: 83
End: 2018-12-03

## 2018-12-03 ENCOUNTER — APPOINTMENT (OUTPATIENT)
Dept: INTERNAL MEDICINE | Facility: CLINIC | Age: 83
End: 2018-12-03
Payer: MEDICARE

## 2018-12-03 VITALS
DIASTOLIC BLOOD PRESSURE: 62 MMHG | HEART RATE: 90 BPM | TEMPERATURE: 97.4 F | SYSTOLIC BLOOD PRESSURE: 124 MMHG | BODY MASS INDEX: 32.75 KG/M2 | WEIGHT: 156 LBS | HEIGHT: 58 IN | OXYGEN SATURATION: 95 % | RESPIRATION RATE: 14 BRPM

## 2018-12-03 PROCEDURE — 94060 EVALUATION OF WHEEZING: CPT

## 2018-12-03 PROCEDURE — 99214 OFFICE O/P EST MOD 30 MIN: CPT | Mod: 25

## 2019-01-14 ENCOUNTER — APPOINTMENT (OUTPATIENT)
Dept: INTERNAL MEDICINE | Facility: CLINIC | Age: 84
End: 2019-01-14

## 2019-05-06 ENCOUNTER — EMERGENCY (EMERGENCY)
Facility: HOSPITAL | Age: 84
LOS: 0 days | Discharge: HOME | End: 2019-05-06
Attending: EMERGENCY MEDICINE | Admitting: EMERGENCY MEDICINE
Payer: MEDICARE

## 2019-05-06 VITALS
DIASTOLIC BLOOD PRESSURE: 58 MMHG | TEMPERATURE: 98 F | RESPIRATION RATE: 17 BRPM | OXYGEN SATURATION: 94 % | HEIGHT: 58 IN | SYSTOLIC BLOOD PRESSURE: 131 MMHG | WEIGHT: 147.93 LBS | HEART RATE: 94 BPM

## 2019-05-06 VITALS
RESPIRATION RATE: 16 BRPM | OXYGEN SATURATION: 96 % | HEART RATE: 87 BPM | DIASTOLIC BLOOD PRESSURE: 66 MMHG | SYSTOLIC BLOOD PRESSURE: 138 MMHG | TEMPERATURE: 99 F

## 2019-05-06 DIAGNOSIS — Z90.89 ACQUIRED ABSENCE OF OTHER ORGANS: Chronic | ICD-10-CM

## 2019-05-06 DIAGNOSIS — I10 ESSENTIAL (PRIMARY) HYPERTENSION: ICD-10-CM

## 2019-05-06 DIAGNOSIS — Z87.891 PERSONAL HISTORY OF NICOTINE DEPENDENCE: ICD-10-CM

## 2019-05-06 DIAGNOSIS — Z90.49 ACQUIRED ABSENCE OF OTHER SPECIFIED PARTS OF DIGESTIVE TRACT: Chronic | ICD-10-CM

## 2019-05-06 DIAGNOSIS — N39.0 URINARY TRACT INFECTION, SITE NOT SPECIFIED: ICD-10-CM

## 2019-05-06 DIAGNOSIS — Z85.118 PERSONAL HISTORY OF OTHER MALIGNANT NEOPLASM OF BRONCHUS AND LUNG: ICD-10-CM

## 2019-05-06 DIAGNOSIS — R10.9 UNSPECIFIED ABDOMINAL PAIN: ICD-10-CM

## 2019-05-06 DIAGNOSIS — K21.9 GASTRO-ESOPHAGEAL REFLUX DISEASE WITHOUT ESOPHAGITIS: ICD-10-CM

## 2019-05-06 DIAGNOSIS — I25.10 ATHEROSCLEROTIC HEART DISEASE OF NATIVE CORONARY ARTERY WITHOUT ANGINA PECTORIS: ICD-10-CM

## 2019-05-06 LAB
ALBUMIN SERPL ELPH-MCNC: 3.8 G/DL — SIGNIFICANT CHANGE UP (ref 3.3–5)
ALP SERPL-CCNC: 63 U/L — SIGNIFICANT CHANGE UP (ref 40–120)
ALT FLD-CCNC: 24 U/L — SIGNIFICANT CHANGE UP (ref 12–78)
ANION GAP SERPL CALC-SCNC: 6 MMOL/L — SIGNIFICANT CHANGE UP (ref 5–17)
APPEARANCE UR: CLEAR — SIGNIFICANT CHANGE UP
AST SERPL-CCNC: 20 U/L — SIGNIFICANT CHANGE UP (ref 15–37)
BACTERIA # UR AUTO: ABNORMAL
BASOPHILS # BLD AUTO: 0.05 K/UL — SIGNIFICANT CHANGE UP (ref 0–0.2)
BASOPHILS NFR BLD AUTO: 0.4 % — SIGNIFICANT CHANGE UP (ref 0–2)
BILIRUB SERPL-MCNC: 0.7 MG/DL — SIGNIFICANT CHANGE UP (ref 0.2–1.2)
BILIRUB UR-MCNC: NEGATIVE — SIGNIFICANT CHANGE UP
BUN SERPL-MCNC: 24 MG/DL — HIGH (ref 7–23)
CALCIUM SERPL-MCNC: 9.3 MG/DL — SIGNIFICANT CHANGE UP (ref 8.5–10.1)
CHLORIDE SERPL-SCNC: 106 MMOL/L — SIGNIFICANT CHANGE UP (ref 96–108)
CO2 SERPL-SCNC: 29 MMOL/L — SIGNIFICANT CHANGE UP (ref 22–31)
COLOR SPEC: YELLOW — SIGNIFICANT CHANGE UP
CREAT SERPL-MCNC: 0.75 MG/DL — SIGNIFICANT CHANGE UP (ref 0.5–1.3)
DIFF PNL FLD: ABNORMAL
EOSINOPHIL # BLD AUTO: 0.1 K/UL — SIGNIFICANT CHANGE UP (ref 0–0.5)
EOSINOPHIL NFR BLD AUTO: 0.8 % — SIGNIFICANT CHANGE UP (ref 0–6)
EPI CELLS # UR: ABNORMAL
GLUCOSE SERPL-MCNC: 103 MG/DL — HIGH (ref 70–99)
GLUCOSE UR QL: NEGATIVE MG/DL — SIGNIFICANT CHANGE UP
HCT VFR BLD CALC: 44.5 % — SIGNIFICANT CHANGE UP (ref 34.5–45)
HGB BLD-MCNC: 14.1 G/DL — SIGNIFICANT CHANGE UP (ref 11.5–15.5)
IMM GRANULOCYTES NFR BLD AUTO: 0.4 % — SIGNIFICANT CHANGE UP (ref 0–1.5)
KETONES UR-MCNC: NEGATIVE — SIGNIFICANT CHANGE UP
LACTATE SERPL-SCNC: 1.3 MMOL/L — SIGNIFICANT CHANGE UP (ref 0.7–2)
LEUKOCYTE ESTERASE UR-ACNC: ABNORMAL
LIDOCAIN IGE QN: 71 U/L — LOW (ref 73–393)
LYMPHOCYTES # BLD AUTO: 1.3 K/UL — SIGNIFICANT CHANGE UP (ref 1–3.3)
LYMPHOCYTES # BLD AUTO: 10.1 % — LOW (ref 13–44)
MCHC RBC-ENTMCNC: 29.8 PG — SIGNIFICANT CHANGE UP (ref 27–34)
MCHC RBC-ENTMCNC: 31.7 GM/DL — LOW (ref 32–36)
MCV RBC AUTO: 94.1 FL — SIGNIFICANT CHANGE UP (ref 80–100)
MONOCYTES # BLD AUTO: 0.65 K/UL — SIGNIFICANT CHANGE UP (ref 0–0.9)
MONOCYTES NFR BLD AUTO: 5.1 % — SIGNIFICANT CHANGE UP (ref 2–14)
NEUTROPHILS # BLD AUTO: 10.72 K/UL — HIGH (ref 1.8–7.4)
NEUTROPHILS NFR BLD AUTO: 83.2 % — HIGH (ref 43–77)
NITRITE UR-MCNC: NEGATIVE — SIGNIFICANT CHANGE UP
NRBC # BLD: 0 /100 WBCS — SIGNIFICANT CHANGE UP (ref 0–0)
PH UR: 5 — SIGNIFICANT CHANGE UP (ref 5–8)
PLATELET # BLD AUTO: 226 K/UL — SIGNIFICANT CHANGE UP (ref 150–400)
POTASSIUM SERPL-MCNC: 4 MMOL/L — SIGNIFICANT CHANGE UP (ref 3.5–5.3)
POTASSIUM SERPL-SCNC: 4 MMOL/L — SIGNIFICANT CHANGE UP (ref 3.5–5.3)
PROT SERPL-MCNC: 7.9 GM/DL — SIGNIFICANT CHANGE UP (ref 6–8.3)
PROT UR-MCNC: 15 MG/DL
RBC # BLD: 4.73 M/UL — SIGNIFICANT CHANGE UP (ref 3.8–5.2)
RBC # FLD: 13.2 % — SIGNIFICANT CHANGE UP (ref 10.3–14.5)
RBC CASTS # UR COMP ASSIST: ABNORMAL /HPF (ref 0–4)
SODIUM SERPL-SCNC: 141 MMOL/L — SIGNIFICANT CHANGE UP (ref 135–145)
SP GR SPEC: 1.02 — SIGNIFICANT CHANGE UP (ref 1.01–1.02)
UROBILINOGEN FLD QL: NEGATIVE MG/DL — SIGNIFICANT CHANGE UP
WBC # BLD: 12.87 K/UL — HIGH (ref 3.8–10.5)
WBC # FLD AUTO: 12.87 K/UL — HIGH (ref 3.8–10.5)
WBC UR QL: ABNORMAL

## 2019-05-06 PROCEDURE — 74177 CT ABD & PELVIS W/CONTRAST: CPT | Mod: 26

## 2019-05-06 PROCEDURE — 99284 EMERGENCY DEPT VISIT MOD MDM: CPT | Mod: 25

## 2019-05-06 RX ORDER — CEPHALEXIN 500 MG
1 CAPSULE ORAL
Qty: 20 | Refills: 0
Start: 2019-05-06 | End: 2019-05-15

## 2019-05-06 RX ORDER — CEFTRIAXONE 500 MG/1
1 INJECTION, POWDER, FOR SOLUTION INTRAMUSCULAR; INTRAVENOUS ONCE
Qty: 0 | Refills: 0 | Status: DISCONTINUED | OUTPATIENT
Start: 2019-05-06 | End: 2019-05-06

## 2019-05-06 RX ORDER — SODIUM CHLORIDE 9 MG/ML
1000 INJECTION INTRAMUSCULAR; INTRAVENOUS; SUBCUTANEOUS ONCE
Qty: 0 | Refills: 0 | Status: COMPLETED | OUTPATIENT
Start: 2019-05-06 | End: 2019-05-06

## 2019-05-06 RX ORDER — CEFTRIAXONE 500 MG/1
1000 INJECTION, POWDER, FOR SOLUTION INTRAMUSCULAR; INTRAVENOUS ONCE
Qty: 0 | Refills: 0 | Status: DISCONTINUED | OUTPATIENT
Start: 2019-05-06 | End: 2019-05-06

## 2019-05-06 RX ADMIN — SODIUM CHLORIDE 2000 MILLILITER(S): 9 INJECTION INTRAMUSCULAR; INTRAVENOUS; SUBCUTANEOUS at 16:03

## 2019-05-06 NOTE — ED STATDOCS - PROGRESS NOTE DETAILS
89 y/o F presents with abd pain x 1 day. pt reports LLQ pain moderate-severe in intensity since this morning, worse when moving. Denies fever/chills, n/v/d, CP, SOB, urinary sx or other complaints at this time. -Radames Mohan PA-C Results reviewed and discussed with pt, pt reports feeling much better. Will give dose of rocephin in ed and dc with keflex. Discussed importance of close FU with PMD/urology.  Pt asked to return to ED immediately for any new or concerning sx or worsening sx. Pt acknowledges and understands plan. -Radames Mohan PA-C

## 2019-05-06 NOTE — ED ADULT NURSE NOTE - OBJECTIVE STATEMENT
pt presents to ED c/o worsening LLQ pain, denies nausea/vomiting. hx diverticulitis states this feels similar.

## 2019-05-06 NOTE — ED STATDOCS - CARE PROVIDER_API CALL
Kurt Ashford)  Urology  [address] [facility], [address]nd [place] [address]  Phone: [phone]  Fax: [phone]  Follow Up Time: 4-6 Days

## 2019-05-06 NOTE — ED STATDOCS - OBJECTIVE STATEMENT
89 y/o female with a PMHx of CAD, chronic back pain, diverticulitis, GERD, HTN, Lung CA, overactive bladder, h/o appendectomy presents to the ED BIBA c/o abd pain since this morning. Pt states she slept fine and woke up with abd pain. Spoke to Dr. Bridges who sent the pt to  for possible diverticulitis. Former smoker. PMD: Dr. Chuck Bridges.

## 2019-05-06 NOTE — ED STATDOCS - ATTENDING CONTRIBUTION TO CARE
I, Violeta Rodriguez MD,  performed the initial face to face bedside interview with this patient regarding history of present illness, review of symptoms and relevant past medical, social and family history.  I completed an independent physical examination.  I was the initial provider who evaluated this patient. I have signed out the follow up of any pending tests (i.e. labs, radiological studies) to the ACP.  I have communicated the patient’s plan of care and disposition with the ACP.  The history, relevant review of systems, past medical and surgical history, medical decision making, and physical examination was documented by the scribe in my presence and I attest to the accuracy of the documentation.

## 2019-05-06 NOTE — ED STATDOCS - CARE PROVIDERS DIRECT ADDRESSES
[email or web address]

## 2019-05-06 NOTE — ED ADULT NURSE NOTE - NSIMPLEMENTINTERV_GEN_ALL_ED
Implemented All Universal Safety Interventions:  [place] to call system. Call bell, personal items and telephone within reach. Instruct patient to call for assistance. Room bathroom lighting operational. Non-slip footwear when patient is off stretcher. Physically safe environment: no spills, clutter or unnecessary equipment. Stretcher in lowest position, wheels locked, appropriate side rails in place.

## 2019-05-07 LAB
-  COAGULASE NEGATIVE STAPHYLOCOCCUS: SIGNIFICANT CHANGE UP
GRAM STN FLD: SIGNIFICANT CHANGE UP
METHOD TYPE: SIGNIFICANT CHANGE UP
SPECIMEN SOURCE: SIGNIFICANT CHANGE UP

## 2019-05-07 NOTE — ED POST DISCHARGE NOTE - RESULT SUMMARY
+blood culture.  Reviewed results with patient who reports she feels well, is afebrile.  Still recommended she return to ED ASAP for repeat blood work, she verbalized understanding and states she does not drive so she needs a ride -Rajinder Rivero PA-C +blood culture.  Reviewed results with patient who reports she feels well, is afebrile.  Still recommended she return to ED ASAP for repeat blood work, she verbalized understanding and states she does not drive so she needs a ride so she will have to call her son and may not be able to come until tomorrow.  Stressed importance of prompt return to ED -Rajinder Rivero PA-C

## 2019-05-08 ENCOUNTER — EMERGENCY (EMERGENCY)
Facility: HOSPITAL | Age: 84
LOS: 0 days | Discharge: HOME | End: 2019-05-08
Attending: EMERGENCY MEDICINE | Admitting: EMERGENCY MEDICINE
Payer: MEDICARE

## 2019-05-08 VITALS
OXYGEN SATURATION: 99 % | TEMPERATURE: 98 F | HEART RATE: 71 BPM | DIASTOLIC BLOOD PRESSURE: 64 MMHG | SYSTOLIC BLOOD PRESSURE: 120 MMHG | RESPIRATION RATE: 16 BRPM

## 2019-05-08 VITALS — WEIGHT: 147.93 LBS

## 2019-05-08 DIAGNOSIS — I10 ESSENTIAL (PRIMARY) HYPERTENSION: ICD-10-CM

## 2019-05-08 DIAGNOSIS — I25.10 ATHEROSCLEROTIC HEART DISEASE OF NATIVE CORONARY ARTERY WITHOUT ANGINA PECTORIS: ICD-10-CM

## 2019-05-08 DIAGNOSIS — Z90.49 ACQUIRED ABSENCE OF OTHER SPECIFIED PARTS OF DIGESTIVE TRACT: Chronic | ICD-10-CM

## 2019-05-08 DIAGNOSIS — R79.89 OTHER SPECIFIED ABNORMAL FINDINGS OF BLOOD CHEMISTRY: ICD-10-CM

## 2019-05-08 DIAGNOSIS — Z85.118 PERSONAL HISTORY OF OTHER MALIGNANT NEOPLASM OF BRONCHUS AND LUNG: ICD-10-CM

## 2019-05-08 DIAGNOSIS — Z88.2 ALLERGY STATUS TO SULFONAMIDES: ICD-10-CM

## 2019-05-08 DIAGNOSIS — N32.81 OVERACTIVE BLADDER: ICD-10-CM

## 2019-05-08 DIAGNOSIS — M54.9 DORSALGIA, UNSPECIFIED: ICD-10-CM

## 2019-05-08 DIAGNOSIS — Z90.89 ACQUIRED ABSENCE OF OTHER ORGANS: Chronic | ICD-10-CM

## 2019-05-08 DIAGNOSIS — K21.9 GASTRO-ESOPHAGEAL REFLUX DISEASE WITHOUT ESOPHAGITIS: ICD-10-CM

## 2019-05-08 DIAGNOSIS — G89.29 OTHER CHRONIC PAIN: ICD-10-CM

## 2019-05-08 LAB
ALBUMIN SERPL ELPH-MCNC: 3.2 G/DL — LOW (ref 3.3–5)
ALP SERPL-CCNC: 53 U/L — SIGNIFICANT CHANGE UP (ref 40–120)
ALT FLD-CCNC: 21 U/L — SIGNIFICANT CHANGE UP (ref 12–78)
ANION GAP SERPL CALC-SCNC: 5 MMOL/L — SIGNIFICANT CHANGE UP (ref 5–17)
AST SERPL-CCNC: 18 U/L — SIGNIFICANT CHANGE UP (ref 15–37)
BASOPHILS # BLD AUTO: 0.05 K/UL — SIGNIFICANT CHANGE UP (ref 0–0.2)
BASOPHILS NFR BLD AUTO: 0.9 % — SIGNIFICANT CHANGE UP (ref 0–2)
BILIRUB SERPL-MCNC: 0.8 MG/DL — SIGNIFICANT CHANGE UP (ref 0.2–1.2)
BUN SERPL-MCNC: 16 MG/DL — SIGNIFICANT CHANGE UP (ref 7–23)
CALCIUM SERPL-MCNC: 8.6 MG/DL — SIGNIFICANT CHANGE UP (ref 8.5–10.1)
CHLORIDE SERPL-SCNC: 106 MMOL/L — SIGNIFICANT CHANGE UP (ref 96–108)
CO2 SERPL-SCNC: 27 MMOL/L — SIGNIFICANT CHANGE UP (ref 22–31)
CREAT SERPL-MCNC: 0.81 MG/DL — SIGNIFICANT CHANGE UP (ref 0.5–1.3)
CULTURE RESULTS: SIGNIFICANT CHANGE UP
EOSINOPHIL # BLD AUTO: 0.19 K/UL — SIGNIFICANT CHANGE UP (ref 0–0.5)
EOSINOPHIL NFR BLD AUTO: 3.4 % — SIGNIFICANT CHANGE UP (ref 0–6)
GLUCOSE SERPL-MCNC: 108 MG/DL — HIGH (ref 70–99)
HCT VFR BLD CALC: 36.5 % — SIGNIFICANT CHANGE UP (ref 34.5–45)
HGB BLD-MCNC: 11.8 G/DL — SIGNIFICANT CHANGE UP (ref 11.5–15.5)
IMM GRANULOCYTES NFR BLD AUTO: 0.2 % — SIGNIFICANT CHANGE UP (ref 0–1.5)
LYMPHOCYTES # BLD AUTO: 1.27 K/UL — SIGNIFICANT CHANGE UP (ref 1–3.3)
LYMPHOCYTES # BLD AUTO: 22.5 % — SIGNIFICANT CHANGE UP (ref 13–44)
MCHC RBC-ENTMCNC: 30.4 PG — SIGNIFICANT CHANGE UP (ref 27–34)
MCHC RBC-ENTMCNC: 32.3 GM/DL — SIGNIFICANT CHANGE UP (ref 32–36)
MCV RBC AUTO: 94.1 FL — SIGNIFICANT CHANGE UP (ref 80–100)
MONOCYTES # BLD AUTO: 0.56 K/UL — SIGNIFICANT CHANGE UP (ref 0–0.9)
MONOCYTES NFR BLD AUTO: 9.9 % — SIGNIFICANT CHANGE UP (ref 2–14)
NEUTROPHILS # BLD AUTO: 3.56 K/UL — SIGNIFICANT CHANGE UP (ref 1.8–7.4)
NEUTROPHILS NFR BLD AUTO: 63.1 % — SIGNIFICANT CHANGE UP (ref 43–77)
NRBC # BLD: 0 /100 WBCS — SIGNIFICANT CHANGE UP (ref 0–0)
ORGANISM # SPEC MICROSCOPIC CNT: SIGNIFICANT CHANGE UP
ORGANISM # SPEC MICROSCOPIC CNT: SIGNIFICANT CHANGE UP
PLATELET # BLD AUTO: 190 K/UL — SIGNIFICANT CHANGE UP (ref 150–400)
POTASSIUM SERPL-MCNC: 3.8 MMOL/L — SIGNIFICANT CHANGE UP (ref 3.5–5.3)
POTASSIUM SERPL-SCNC: 3.8 MMOL/L — SIGNIFICANT CHANGE UP (ref 3.5–5.3)
PROT SERPL-MCNC: 6.9 GM/DL — SIGNIFICANT CHANGE UP (ref 6–8.3)
RBC # BLD: 3.88 M/UL — SIGNIFICANT CHANGE UP (ref 3.8–5.2)
RBC # FLD: 13.2 % — SIGNIFICANT CHANGE UP (ref 10.3–14.5)
SODIUM SERPL-SCNC: 138 MMOL/L — SIGNIFICANT CHANGE UP (ref 135–145)
SPECIMEN SOURCE: SIGNIFICANT CHANGE UP
WBC # BLD: 5.64 K/UL — SIGNIFICANT CHANGE UP (ref 3.8–10.5)
WBC # FLD AUTO: 5.64 K/UL — SIGNIFICANT CHANGE UP (ref 3.8–10.5)

## 2019-05-08 PROCEDURE — 99284 EMERGENCY DEPT VISIT MOD MDM: CPT

## 2019-05-08 RX ORDER — CEPHALEXIN 500 MG
500 CAPSULE ORAL ONCE
Qty: 0 | Refills: 0 | Status: COMPLETED | OUTPATIENT
Start: 2019-05-08 | End: 2019-05-08

## 2019-05-08 RX ADMIN — Medication 500 MILLIGRAM(S): at 09:48

## 2019-05-08 NOTE — ED ADULT NURSE NOTE - CHIEF COMPLAINT QUOTE
pt states she received a phone call last night from an RN at [place] telling her to come to ED for abnormal lab work, pt states she was seen in ED 2 days ago for abdominal pain, now feels well. pt denies fevers and states she is unsure what was abnormal in her lab work.

## 2019-05-08 NOTE — ED PROVIDER NOTE - OBJECTIVE STATEMENT
88 y/o female with a PMHx of CAD, chronic back pain, diverticulitis, GERD, HTN, Lung CA, overactive bladder, h/o appendectomy presents to the ED for abnormal lab work. Pt was seen in [facility] two days ago for abd pain. Pt had a CAT scan done and was diagnosed with diverticulosis and a kidney infection. Pt's urine was positive and pt was diagnosed with a UTI as well. Pt was sent home with an abx. Pt states she has only taken one dose of her abx this morning. Pt presents today due to one of her blood cultures taken from two days ago showed coagulate negative staff. Pt was told to come back to the ED due to this result. Currently, pt denies fevers, chills. GI: Dr. Lucero.

## 2019-05-08 NOTE — ED PROVIDER NOTE - CARE PROVIDER_API CALL
Chuck Cuevas (MD)  [place] [address]  Phone: [phone]  Fax: [phone]  Follow Up Time: 1-3 Days

## 2019-05-08 NOTE — ED PROVIDER NOTE - CLINICAL SUMMARY MEDICAL DECISION MAKING FREE TEXT BOX
Elderly female presents to the ED for repeat blood cultures as one of two cultures grew coagulate negative staff. Will obtain repeat blood cultures, give IV abx. Pt appears well, will discharge and advise pt for strict Follow up with PMD

## 2019-05-08 NOTE — ED ADULT TRIAGE NOTE - CHIEF COMPLAINT QUOTE
pt states she received a phone call last night from an RN at [place] telling her to come to ED for abnormal lab work, pt states she was seen in ED 2 days ago for abdominal pain, now feels well. pt denies fevers and states she is unsure what was abnormal in her lab work.

## 2019-05-08 NOTE — ED ADULT NURSE NOTE - NSSUHOSCREENINGYN_ED_ALL_ED
Patient Education        Child's Well Visit, 6 Months: Care Instructions  Your Care Instructions    Your baby's bond with you and other caregivers will be very strong by now. He or she may be shy around strangers and may hold on to familiar people. It is normal for a baby to feel safer to crawl and explore with people he or she knows. At six months, your baby may use his or her voice to make new sounds or playful screams. He or she may sit with support. Your baby may begin to feed himself or herself. Your baby may start to scoot or crawl when lying on his or her tummy. Follow-up care is a key part of your child's treatment and safety. Be sure to make and go to all appointments, and call your doctor if your child is having problems. It's also a good idea to know your child's test results and keep a list of the medicines your child takes. How can you care for your child at home? Feeding  · Keep breastfeeding for at least 12 months to prevent colds and ear infections. · If you do not breastfeed, give your baby a formula with iron. · Use a spoon to feed your baby plain baby foods at 2 or 3 meals a day. · When you offer a new food to your baby, wait 2 to 3 days in between each new food. Watch for a rash, diarrhea, breathing problems, or gas. These may be signs of a food or milk allergy. · Let your baby decide how much to eat. · Do not give your baby honey in the first year of life. Honey can make your baby sick. · Offer water when your child is thirsty. Juice does not have the valuable fiber that whole fruit has. Do not give your baby soda pop, juice, fast food, or sweets. Safety  · Put your baby to sleep on his or her back, not on the side or tummy. This reduces the risk of SIDS. Use a firm, flat mattress. Do not put pillows in the crib. Do not use crib bumpers. · Use a car seat for every ride. Install it properly in the back seat facing backward.  If you have questions about car seats, call the [facility] [address] [place] [address] at [phone]. · Tell your doctor if your child spends a lot of time in a house built before 1978. The paint may have lead in it, which can be harmful. · Keep the number for Poison Control (1-347.209.3342) in or near your phone. · Do not use walkers, which can easily tip over and lead to serious injury. · Avoid burns. Turn water temperature down, and always check it before baths. Do not drink or hold hot liquids near your baby. Immunizations  · Most babies get a dose of important vaccines at their 6-month checkup. Make sure that your baby gets the recommended childhood vaccines for illnesses, such as whooping cough and diphtheria. These vaccines will help keep your baby healthy and prevent the spread of disease. Your baby needs all doses to be protected. When should you call for help? Watch closely for changes in your child's health, and be sure to contact your doctor if:    · You are concerned that your child is not growing or developing normally.     · You are worried about your child's behavior.     · You need more information about how to care for your child, or you have questions or concerns. Where can you learn more? Go to [email or web address].[email or web address] org and sign in to your [employer] account. Enter D015 in the [facility] box to learn more about \"Child's Well Visit, 6 Months: Care Instructions. \"     If you do not have an account, please click on the \"Sign Up Now\" link. Current as of: May 12, 2017  Content Version: 11.6  © 6429-7264 [employer]. Care instructions adapted under license by [facility] ([facility]). If you have questions about a medical condition or this instruction, always ask your healthcare professional. [place] [address] any warranty or liability for your use of this information. Yes - the patient is able to be screened

## 2019-05-09 LAB
-  AMIKACIN: SIGNIFICANT CHANGE UP
-  AMIKACIN: SIGNIFICANT CHANGE UP
-  AMPICILLIN/SULBACTAM: SIGNIFICANT CHANGE UP
-  AMPICILLIN/SULBACTAM: SIGNIFICANT CHANGE UP
-  AMPICILLIN: SIGNIFICANT CHANGE UP
-  AMPICILLIN: SIGNIFICANT CHANGE UP
-  AZTREONAM: SIGNIFICANT CHANGE UP
-  AZTREONAM: SIGNIFICANT CHANGE UP
-  CEFAZOLIN: SIGNIFICANT CHANGE UP
-  CEFAZOLIN: SIGNIFICANT CHANGE UP
-  CEFEPIME: SIGNIFICANT CHANGE UP
-  CEFEPIME: SIGNIFICANT CHANGE UP
-  CEFOXITIN: SIGNIFICANT CHANGE UP
-  CEFOXITIN: SIGNIFICANT CHANGE UP
-  CEFTRIAXONE: SIGNIFICANT CHANGE UP
-  CEFTRIAXONE: SIGNIFICANT CHANGE UP
-  CIPROFLOXACIN: SIGNIFICANT CHANGE UP
-  CIPROFLOXACIN: SIGNIFICANT CHANGE UP
-  ERTAPENEM: SIGNIFICANT CHANGE UP
-  ERTAPENEM: SIGNIFICANT CHANGE UP
-  GENTAMICIN: SIGNIFICANT CHANGE UP
-  GENTAMICIN: SIGNIFICANT CHANGE UP
-  IMIPENEM: SIGNIFICANT CHANGE UP
-  IMIPENEM: SIGNIFICANT CHANGE UP
-  LEVOFLOXACIN: SIGNIFICANT CHANGE UP
-  LEVOFLOXACIN: SIGNIFICANT CHANGE UP
-  MEROPENEM: SIGNIFICANT CHANGE UP
-  MEROPENEM: SIGNIFICANT CHANGE UP
-  NITROFURANTOIN: SIGNIFICANT CHANGE UP
-  NITROFURANTOIN: SIGNIFICANT CHANGE UP
-  PIPERACILLIN/TAZOBACTAM: SIGNIFICANT CHANGE UP
-  PIPERACILLIN/TAZOBACTAM: SIGNIFICANT CHANGE UP
-  TIGECYCLINE: SIGNIFICANT CHANGE UP
-  TIGECYCLINE: SIGNIFICANT CHANGE UP
-  TOBRAMYCIN: SIGNIFICANT CHANGE UP
-  TOBRAMYCIN: SIGNIFICANT CHANGE UP
-  TRIMETHOPRIM/SULFAMETHOXAZOLE: SIGNIFICANT CHANGE UP
-  TRIMETHOPRIM/SULFAMETHOXAZOLE: SIGNIFICANT CHANGE UP
CULTURE RESULTS: SIGNIFICANT CHANGE UP
METHOD TYPE: SIGNIFICANT CHANGE UP
METHOD TYPE: SIGNIFICANT CHANGE UP
ORGANISM # SPEC MICROSCOPIC CNT: SIGNIFICANT CHANGE UP
SPECIMEN SOURCE: SIGNIFICANT CHANGE UP

## 2019-05-10 ENCOUNTER — EMERGENCY (EMERGENCY)
Facility: HOSPITAL | Age: 84
LOS: 0 days | Discharge: HOME | End: 2019-05-10
Attending: STUDENT IN AN ORGANIZED HEALTH CARE EDUCATION/TRAINING PROGRAM | Admitting: STUDENT IN AN ORGANIZED HEALTH CARE EDUCATION/TRAINING PROGRAM
Payer: MEDICARE

## 2019-05-10 VITALS — HEIGHT: 58 IN | WEIGHT: 147.93 LBS

## 2019-05-10 VITALS
DIASTOLIC BLOOD PRESSURE: 51 MMHG | SYSTOLIC BLOOD PRESSURE: 110 MMHG | HEART RATE: 62 BPM | RESPIRATION RATE: 17 BRPM | TEMPERATURE: 98 F | OXYGEN SATURATION: 99 %

## 2019-05-10 DIAGNOSIS — Z90.89 ACQUIRED ABSENCE OF OTHER ORGANS: Chronic | ICD-10-CM

## 2019-05-10 DIAGNOSIS — Z90.49 ACQUIRED ABSENCE OF OTHER SPECIFIED PARTS OF DIGESTIVE TRACT: Chronic | ICD-10-CM

## 2019-05-10 DIAGNOSIS — I25.10 ATHEROSCLEROTIC HEART DISEASE OF NATIVE CORONARY ARTERY WITHOUT ANGINA PECTORIS: ICD-10-CM

## 2019-05-10 DIAGNOSIS — N32.81 OVERACTIVE BLADDER: ICD-10-CM

## 2019-05-10 DIAGNOSIS — I10 ESSENTIAL (PRIMARY) HYPERTENSION: ICD-10-CM

## 2019-05-10 DIAGNOSIS — Z85.118 PERSONAL HISTORY OF OTHER MALIGNANT NEOPLASM OF BRONCHUS AND LUNG: ICD-10-CM

## 2019-05-10 DIAGNOSIS — Z88.2 ALLERGY STATUS TO SULFONAMIDES: ICD-10-CM

## 2019-05-10 DIAGNOSIS — R78.81 BACTEREMIA: ICD-10-CM

## 2019-05-10 DIAGNOSIS — K21.9 GASTRO-ESOPHAGEAL REFLUX DISEASE WITHOUT ESOPHAGITIS: ICD-10-CM

## 2019-05-10 DIAGNOSIS — Z04.89 ENCOUNTER FOR EXAMINATION AND OBSERVATION FOR OTHER SPECIFIED REASONS: ICD-10-CM

## 2019-05-10 PROCEDURE — 99283 EMERGENCY DEPT VISIT LOW MDM: CPT

## 2019-05-10 NOTE — ED STATDOCS - OBJECTIVE STATEMENT
88 y/o F with PMHx of CAD, HTN, GERD, chronic back pain, lung CA presenting to the ED for call back of positive blood culture. Pt is on PO abx for diverticulitis. 88 y/o F with PMHx of CAD, HTN, GERD, chronic back pain, lung CA presenting to the ED for call back of positive blood culture. Pt is on PO abx for diverticulitis. Pt was in [facility] 4 days ago for diverticulosis and was d/c with abx. Pt then came back 2 days ago for positive blood cultures. Pt then received a call this morning stating that her blood cultures were positive. Denies fever, or chills. PMD: Dr. Guerrero. 88 y/o F with PMHx of CAD, HTN, GERD, chronic back pain, lung CA presenting to the ED for call back of positive blood culture. Pt is on PO abx for diverticulitis. Pt was in [facility] 4 days ago for diverticulosis and was d/c with abx. Pt then came back 2 days ago for positive blood cultures. Pt then received a call this morning stating that her blood cultures were positive. Denies fever, pain, or chills. PMD: Dr. Guerrero. 89 y/o F with PMHx of CAD, HTN, GERD, chronic back pain, lung CA presenting to the ED for call back of positive blood culture. Pt is on PO abx for diverticulitis. Pt was in [facility] 4 days ago for diverticulosis and was d/c with abx. Pt then came back 2 days ago for positive blood cultures. Had repeat blood cultures sent; Pt then received a call this morning stating that her blood cultures were positive. Denies fever, pain, or chills. PMD: Dr. Guerrero.

## 2019-05-10 NOTE — ED STATDOCS - ATTENDING CONTRIBUTION TO CARE
I, Stacy Akhtar DO,  performed the initial face to face bedside interview with this patient regarding history of present illness, review of symptoms and relevant past medical, social and family history.  I completed an independent physical examination.  I was the initial provider who evaluated this patient. I have signed out the follow up of any pending tests (i.e. labs, radiological studies) to the ACP.  I have communicated the patient’s plan of care and disposition with the ACP.  The history, relevant review of systems, past medical and surgical history, medical decision making, and physical examination was documented by the scribe in my presence and I attest to the accuracy of the documentation.

## 2019-05-10 NOTE — ED ADULT NURSE NOTE - OBJECTIVE STATEMENT
Arrived to ED due to call received to come to ED with C/O positive blood cultures. Patient on antibiotics for diverticulitis.

## 2019-05-10 NOTE — ED STATDOCS - CLINICAL SUMMARY MEDICAL DECISION MAKING FREE TEXT BOX
89 F mistakenly called for positive blood cultures. Pt had cultures 2 days ago that were evaluated, repeat cultures are negative. Pt with no complaints. Pt to be d/c home at this time.

## 2019-05-11 LAB
CULTURE RESULTS: SIGNIFICANT CHANGE UP
SPECIMEN SOURCE: SIGNIFICANT CHANGE UP

## 2019-05-13 LAB
CULTURE RESULTS: SIGNIFICANT CHANGE UP
CULTURE RESULTS: SIGNIFICANT CHANGE UP
SPECIMEN SOURCE: SIGNIFICANT CHANGE UP
SPECIMEN SOURCE: SIGNIFICANT CHANGE UP

## 2019-06-06 ENCOUNTER — NON-APPOINTMENT (OUTPATIENT)
Age: 84
End: 2019-06-06

## 2019-06-06 ENCOUNTER — APPOINTMENT (OUTPATIENT)
Dept: INTERNAL MEDICINE | Facility: CLINIC | Age: 84
End: 2019-06-06
Payer: MEDICARE

## 2019-06-06 VITALS
DIASTOLIC BLOOD PRESSURE: 86 MMHG | WEIGHT: 144 LBS | RESPIRATION RATE: 20 BRPM | BODY MASS INDEX: 31.07 KG/M2 | OXYGEN SATURATION: 94 % | SYSTOLIC BLOOD PRESSURE: 126 MMHG | HEART RATE: 82 BPM | HEIGHT: 57 IN | TEMPERATURE: 98.5 F

## 2019-06-06 DIAGNOSIS — J98.11 ATELECTASIS: ICD-10-CM

## 2019-06-06 PROCEDURE — 94060 EVALUATION OF WHEEZING: CPT

## 2019-06-06 PROCEDURE — 99214 OFFICE O/P EST MOD 30 MIN: CPT | Mod: 25

## 2019-06-06 RX ORDER — CYCLOBENZAPRINE HYDROCHLORIDE 10 MG/1
10 TABLET, FILM COATED ORAL
Qty: 15 | Refills: 0 | Status: DISCONTINUED | COMMUNITY
Start: 2017-03-23 | End: 2019-06-06

## 2019-06-06 RX ORDER — ALBUTEROL SULFATE 90 UG/1
108 (90 BASE) AEROSOL, METERED RESPIRATORY (INHALATION) EVERY 6 HOURS
Refills: 0 | Status: DISCONTINUED | COMMUNITY
End: 2019-06-06

## 2019-06-06 NOTE — PHYSICAL EXAM
[Normal Appearance] : normal appearance [General Appearance - Well Developed] : well developed [General Appearance - Well Nourished] : well nourished [Well Groomed] : well groomed [General Appearance - In No Acute Distress] : no acute distress [No Deformities] : no deformities [Normal Conjunctiva] : the conjunctiva exhibited no abnormalities [Eyelids - No Xanthelasma] : the eyelids demonstrated no xanthelasmas [Normal Oropharynx] : normal oropharynx [Neck Appearance] : the appearance of the neck was normal [Neck Cervical Mass (___cm)] : no neck mass was observed [Jugular Venous Distention Increased] : there was no jugular-venous distention [Thyroid Diffuse Enlargement] : the thyroid was not enlarged [Thyroid Nodule] : there were no palpable thyroid nodules [Heart Rate And Rhythm] : heart rate and rhythm were normal [Heart Sounds] : normal S1 and S2 [Murmurs] : no murmurs present [Respiration, Rhythm And Depth] : normal respiratory rhythm and effort [Exaggerated Use Of Accessory Muscles For Inspiration] : no accessory muscle use [Auscultation Breath Sounds / Voice Sounds] : lungs were clear to auscultation bilaterally [Abdomen Soft] : soft [Abdomen Tenderness] : non-tender [Abdomen Mass (___ Cm)] : no abdominal mass palpated [Abnormal Walk] : normal gait [Gait - Sufficient For Exercise Testing] : the gait was sufficient for exercise testing [Cyanosis, Localized] : no localized cyanosis [Nail Clubbing] : no clubbing of the fingernails [Petechial Hemorrhages (___cm)] : no petechial hemorrhages [Skin Color & Pigmentation] : normal skin color and pigmentation [] : no rash [No Venous Stasis] : no venous stasis [Skin Lesions] : no skin lesions [No Skin Ulcers] : no skin ulcer [Deep Tendon Reflexes (DTR)] : deep tendon reflexes were 2+ and symmetric [No Xanthoma] : no  xanthoma was observed [Sensation] : the sensory exam was normal to light touch and pinprick [No Focal Deficits] : no focal deficits [Impaired Insight] : insight and judgment were intact [Oriented To Time, Place, And Person] : oriented to person, place, and time [Affect] : the affect was normal

## 2019-06-06 NOTE — ASSESSMENT
[FreeTextEntry1] : Mrs. Elizabeth presents for a followup evaluation. She is feeling well. She will be given a prescription for albuterol metered-dose inhaler to be used on an as-needed basis. She did have a CT/PET scan 6 months ago which showed no significant change. She will now have yearly CAT scan surveillance with regard to her previous diagnosis one cancer. The patient will follow up in 6 months with a CT scan of the chest without contrast prior to her visit.

## 2019-06-06 NOTE — HISTORY OF PRESENT ILLNESS
[FreeTextEntry1] : Mrs. Elizabeth presents for followup evaluation. She is accompanied by her daughter-in-law. Patient has a history of a right lower lobe non-small cell lung carcinoma. She has some shortness of breath with exertion. She denies any chest pain or palpitations. Mrs. Elizabeth has no hemoptysis, anorexia or weight loss. She was treated with CyberKnife therapy.

## 2019-06-25 ENCOUNTER — NON-APPOINTMENT (OUTPATIENT)
Age: 84
End: 2019-06-25

## 2019-06-25 ENCOUNTER — APPOINTMENT (OUTPATIENT)
Dept: CARDIOLOGY | Facility: CLINIC | Age: 84
End: 2019-06-25
Payer: MEDICARE

## 2019-06-25 VITALS
OXYGEN SATURATION: 94 % | RESPIRATION RATE: 14 BRPM | SYSTOLIC BLOOD PRESSURE: 142 MMHG | HEART RATE: 74 BPM | WEIGHT: 144 LBS | DIASTOLIC BLOOD PRESSURE: 85 MMHG | BODY MASS INDEX: 31.16 KG/M2 | TEMPERATURE: 97.4 F

## 2019-06-25 PROCEDURE — 99214 OFFICE O/P EST MOD 30 MIN: CPT | Mod: 25

## 2019-06-25 PROCEDURE — 93000 ELECTROCARDIOGRAM COMPLETE: CPT

## 2019-07-08 ENCOUNTER — RECORD ABSTRACTING (OUTPATIENT)
Age: 84
End: 2019-07-08

## 2019-07-08 RX ORDER — OXYBUTYNIN CHLORIDE 5 MG/1
5 TABLET, EXTENDED RELEASE ORAL
Qty: 180 | Refills: 0 | Status: DISCONTINUED | COMMUNITY
Start: 2016-04-18 | End: 2019-07-08

## 2019-07-08 RX ORDER — DONEPEZIL HYDROCHLORIDE 10 MG/1
10 TABLET ORAL DAILY
Refills: 0 | Status: ACTIVE | COMMUNITY
Start: 2016-11-08

## 2019-07-08 RX ORDER — GLUCOSAMINE HCL/CHONDROITIN SU 500-400 MG
1000 CAPSULE ORAL DAILY
Refills: 0 | Status: ACTIVE | COMMUNITY

## 2019-10-28 NOTE — ED STATDOCS - PMH
CAD (coronary artery disease)    Chronic back pain    Diverticulitis    GERD (gastroesophageal reflux disease)    HTN (hypertension)    Lung cancer    Overactive bladder no dyspnea on exertion/no chest pain/no palpitations

## 2019-11-27 ENCOUNTER — APPOINTMENT (OUTPATIENT)
Dept: CARDIOLOGY | Facility: CLINIC | Age: 84
End: 2019-11-27
Payer: MEDICARE

## 2019-11-27 ENCOUNTER — NON-APPOINTMENT (OUTPATIENT)
Age: 84
End: 2019-11-27

## 2019-11-27 VITALS
DIASTOLIC BLOOD PRESSURE: 78 MMHG | WEIGHT: 136 LBS | HEART RATE: 79 BPM | SYSTOLIC BLOOD PRESSURE: 125 MMHG | OXYGEN SATURATION: 96 % | BODY MASS INDEX: 29.34 KG/M2 | HEIGHT: 57 IN

## 2019-11-27 DIAGNOSIS — I10 ESSENTIAL (PRIMARY) HYPERTENSION: ICD-10-CM

## 2019-11-27 DIAGNOSIS — I45.10 UNSPECIFIED RIGHT BUNDLE-BRANCH BLOCK: ICD-10-CM

## 2019-11-27 DIAGNOSIS — I77.9 DISORDER OF ARTERIES AND ARTERIOLES, UNSPECIFIED: ICD-10-CM

## 2019-11-27 DIAGNOSIS — I25.10 ATHEROSCLEROTIC HEART DISEASE OF NATIVE CORONARY ARTERY W/OUT ANGINA PECTORIS: ICD-10-CM

## 2019-11-27 DIAGNOSIS — I34.0 NONRHEUMATIC MITRAL (VALVE) INSUFFICIENCY: ICD-10-CM

## 2019-11-27 PROCEDURE — 93000 ELECTROCARDIOGRAM COMPLETE: CPT

## 2019-11-27 PROCEDURE — 99214 OFFICE O/P EST MOD 30 MIN: CPT

## 2019-11-27 NOTE — REVIEW OF SYSTEMS
[Shortness Of Breath] : shortness of breath [Chest  Pressure] : no chest pressure [Chest Pain] : no chest pain [Lower Ext Edema] : no extremity edema [Palpitations] : no palpitations [Joint Pain] : joint pain [Memory Lapses Or Loss] : memory lapses or loss

## 2019-11-27 NOTE — HISTORY OF PRESENT ILLNESS
[FreeTextEntry1] : Krysten Elizabeth is a 89-year-old woman with a history of right lower lobe non-small cell lung cancer (treated with Cyberknife therapy) , COPD, hypertension, hypercholesterolemia, GERD, right bundle branch block, carotid artery stenosis, coronary artery disease, hypertension, obesity, pulmonary hypertension, and mitral insufficiency who presents to [facility] care - she had been seeing Dr. Cipriano Hamilton (now retired).  She says that she is doing well from a cardiac standpoint and not experiencing angina or palpitations; she describes some mild, chronic, in stable dyspnea.  She describes a good baseline functional status.

## 2019-11-27 NOTE — PHYSICAL EXAM
[Well Groomed] : well groomed [General Appearance - In No Acute Distress] : no acute distress [No Oral Cyanosis] : no oral cyanosis [Respiration, Rhythm And Depth] : normal respiratory rhythm and effort [Auscultation Breath Sounds / Voice Sounds] : lungs were clear to auscultation bilaterally [Heart Rate And Rhythm] : heart rate and rhythm were normal [Heart Sounds] : normal S1 and S2 [Edema] : no peripheral edema present [FreeTextEntry1] : Abnormal gait - walks with cane [Nail Clubbing] : no clubbing of the fingernails [Cyanosis, Localized] : no localized cyanosis [] : no rash [Oriented To Time, Place, And Person] : oriented to person, place, and time [Impaired Insight] : insight and judgment were intact

## 2019-11-27 NOTE — DISCUSSION/SUMMARY
[___ Month(s)] : [unfilled] month(s) [With Me] : with me [FreeTextEntry1] : \par Hypertension: Satisfactory control.\par \par Mitral regurgitation: Moderate in severity; anticipate periodic imaging to assess for interval change; no intervention at this time.\par \par Right bundle branch block: Chronic.\par \par Dyspnea: Multifactorial; patient describes stable and chronic symptoms.\par \par Coronary artery disease: Chart describes a history of atherosclerotic heart disease - patient unable to provide details of past diagnosis; no angina.\par \par Carotid artery disease: Mild to moderate; continue simvastatin.

## 2019-12-11 ENCOUNTER — APPOINTMENT (OUTPATIENT)
Dept: INTERNAL MEDICINE | Facility: CLINIC | Age: 84
End: 2019-12-11
Payer: MEDICARE

## 2019-12-11 ENCOUNTER — NON-APPOINTMENT (OUTPATIENT)
Age: 84
End: 2019-12-11

## 2019-12-11 VITALS
SYSTOLIC BLOOD PRESSURE: 124 MMHG | WEIGHT: 135 LBS | DIASTOLIC BLOOD PRESSURE: 70 MMHG | TEMPERATURE: 97.8 F | BODY MASS INDEX: 28.34 KG/M2 | RESPIRATION RATE: 20 BRPM | HEART RATE: 60 BPM | HEIGHT: 58 IN | OXYGEN SATURATION: 95 %

## 2019-12-11 DIAGNOSIS — C34.90 MALIGNANT NEOPLASM OF UNSPECIFIED PART OF UNSPECIFIED BRONCHUS OR LUNG: ICD-10-CM

## 2019-12-11 PROCEDURE — 99214 OFFICE O/P EST MOD 30 MIN: CPT | Mod: 25

## 2019-12-11 PROCEDURE — 94060 EVALUATION OF WHEEZING: CPT

## 2019-12-11 RX ORDER — METRONIDAZOLE 500 MG/1
5 TABLET ORAL
Refills: 0 | Status: COMPLETED | COMMUNITY

## 2019-12-11 RX ORDER — ELECTROLYTES/DEXTROSE
SOLUTION, ORAL ORAL DAILY
Refills: 0 | Status: DISCONTINUED | COMMUNITY
End: 2019-12-11

## 2019-12-11 RX ORDER — PANTOPRAZOLE 40 MG/1
40 TABLET, DELAYED RELEASE ORAL
Refills: 0 | Status: COMPLETED | COMMUNITY

## 2019-12-11 RX ORDER — RIBOFLAVIN (VITAMIN B2) 100 MG
100 TABLET ORAL DAILY
Refills: 0 | Status: DISCONTINUED | COMMUNITY
End: 2019-12-11

## 2019-12-11 NOTE — PHYSICAL EXAM
[Normal Appearance] : normal appearance [General Appearance - Well Developed] : well developed [Well Groomed] : well groomed [General Appearance - Well Nourished] : well nourished [No Deformities] : no deformities [General Appearance - In No Acute Distress] : no acute distress [Normal Conjunctiva] : the conjunctiva exhibited no abnormalities [Eyelids - No Xanthelasma] : the eyelids demonstrated no xanthelasmas [Normal Oropharynx] : normal oropharynx [Neck Appearance] : the appearance of the neck was normal [Neck Cervical Mass (___cm)] : no neck mass was observed [Jugular Venous Distention Increased] : there was no jugular-venous distention [Thyroid Diffuse Enlargement] : the thyroid was not enlarged [Thyroid Nodule] : there were no palpable thyroid nodules [Heart Rate And Rhythm] : heart rate and rhythm were normal [Heart Sounds] : normal S1 and S2 [Murmurs] : no murmurs present [Respiration, Rhythm And Depth] : normal respiratory rhythm and effort [Exaggerated Use Of Accessory Muscles For Inspiration] : no accessory muscle use [Auscultation Breath Sounds / Voice Sounds] : lungs were clear to auscultation bilaterally [Abdomen Soft] : soft [Abdomen Tenderness] : non-tender [Abnormal Walk] : normal gait [Abdomen Mass (___ Cm)] : no abdominal mass palpated [Cyanosis, Localized] : no localized cyanosis [Gait - Sufficient For Exercise Testing] : the gait was sufficient for exercise testing [Nail Clubbing] : no clubbing of the fingernails [Skin Color & Pigmentation] : normal skin color and pigmentation [Petechial Hemorrhages (___cm)] : no petechial hemorrhages [] : no rash [No Skin Ulcers] : no skin ulcer [No Venous Stasis] : no venous stasis [Skin Lesions] : no skin lesions [No Xanthoma] : no  xanthoma was observed [Deep Tendon Reflexes (DTR)] : deep tendon reflexes were 2+ and symmetric [No Focal Deficits] : no focal deficits [Oriented To Time, Place, And Person] : oriented to person, place, and time [Sensation] : the sensory exam was normal to light touch and pinprick [Affect] : the affect was normal [Impaired Insight] : insight and judgment were intact

## 2019-12-11 NOTE — REASON FOR VISIT
[Follow-Up - From Hospitalization] : a hospitalization follow-up [Abnormal CT Scan] : abnormal CT Scan [Lung Cancer] : lung cancer

## 2019-12-12 NOTE — ASSESSMENT
[FreeTextEntry1] : Mrs. Caro presents for followup evaluation patient is a history of non-small cell lung carcinoma with CyberKnife therapy to the right lower lobe and 2013. Most recent CAT scan shows some right lower lobe pleural thickening and a new, small right pleural effusion. Patient be sent for a CT PET scan to see if there is any increased activity suspicious for recurrent malignancy.

## 2019-12-12 NOTE — HISTORY OF PRESENT ILLNESS
[FreeTextEntry1] : Mrs. Elizabeth presents for father who is present repeat CT scan of the chest. Patient has history of right lower lobe non-small cell lung carcinoma. She had CyberKnife therapy in 2013. Patient has no chest pain, shortness of breath palpitations. She denies any hemoptysis, anorexia weight loss.

## 2019-12-19 DIAGNOSIS — C34.81 MALIGNANT NEOPLASM OF OVERLAPPING SITES OF RIGHT BRONCHUS AND LUNG: ICD-10-CM

## 2019-12-20 NOTE — ED ADULT NURSE NOTE - PRIMARY CARE PROVIDER
CM met with Amy from [facility], who had come to visit with Pt  YOSELYN reviewed plans for Pt to d/c on Monday to [facility] [address]  Pt was pleasant & polite during Amy's visit  She had no questions about the d/c & the team will follow-up with Pt at [facility] next week for their visit  Angelo

## 2019-12-26 ENCOUNTER — APPOINTMENT (OUTPATIENT)
Dept: INTERNAL MEDICINE | Facility: CLINIC | Age: 84
End: 2019-12-26
Payer: MEDICARE

## 2019-12-26 VITALS
OXYGEN SATURATION: 90 % | HEIGHT: 58 IN | HEART RATE: 81 BPM | SYSTOLIC BLOOD PRESSURE: 126 MMHG | RESPIRATION RATE: 18 BRPM | DIASTOLIC BLOOD PRESSURE: 74 MMHG | TEMPERATURE: 97.8 F | WEIGHT: 134 LBS | BODY MASS INDEX: 28.13 KG/M2

## 2019-12-26 DIAGNOSIS — R06.00 DYSPNEA, UNSPECIFIED: ICD-10-CM

## 2019-12-26 DIAGNOSIS — J90 PLEURAL EFFUSION, NOT ELSEWHERE CLASSIFIED: ICD-10-CM

## 2019-12-26 DIAGNOSIS — I10 ESSENTIAL (PRIMARY) HYPERTENSION: ICD-10-CM

## 2019-12-26 DIAGNOSIS — C34.90 MALIGNANT NEOPLASM OF UNSPECIFIED PART OF UNSPECIFIED BRONCHUS OR LUNG: ICD-10-CM

## 2019-12-26 PROCEDURE — 99214 OFFICE O/P EST MOD 30 MIN: CPT | Mod: 25

## 2019-12-26 RX ORDER — ALBUTEROL SULFATE 90 UG/1
108 (90 BASE) AEROSOL, METERED RESPIRATORY (INHALATION) EVERY 6 HOURS
Qty: 1 | Refills: 5 | Status: DISCONTINUED | COMMUNITY
Start: 2019-06-06 | End: 2019-12-26

## 2019-12-26 RX ORDER — MULTIVITAMIN
TABLET ORAL DAILY
Refills: 0 | Status: DISCONTINUED | COMMUNITY
End: 2019-12-26

## 2019-12-26 NOTE — ASSESSMENT
[FreeTextEntry1] : Mrs. Elizabeth presents for followup evaluation. A recent CT scan showed no poor thickening in the right lower lobe. 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 Repeat CT/PET scan did show a mild abnormal uptake in the area. Findings will be reviewed with radiation oncology. Patient may require a repeat CT-guided needle biopsy. She will followup as scheduled.

## 2019-12-26 NOTE — PHYSICAL EXAM
[Normal Conjunctiva] : the conjunctiva exhibited no abnormalities [Eyelids - No Xanthelasma] : the eyelids demonstrated no xanthelasmas [Neck Appearance] : the appearance of the neck was normal [Neck Cervical Mass (___cm)] : no neck mass was observed [Jugular Venous Distention Increased] : there was no jugular-venous distention [Thyroid Diffuse Enlargement] : the thyroid was not enlarged [Thyroid Nodule] : there were no palpable thyroid nodules [Heart Rate And Rhythm] : heart rate and rhythm were normal [Heart Sounds] : normal S1 and S2 [Murmurs] : no murmurs present [FreeTextEntry1] : Decreased breath sounds right base [Abdomen Tenderness] : non-tender [Abdomen Soft] : soft [Abdomen Mass (___ Cm)] : no abdominal mass palpated [Abnormal Walk] : normal gait [Gait - Sufficient For Exercise Testing] : the gait was sufficient for exercise testing [Nail Clubbing] : no clubbing of the fingernails [Cyanosis, Localized] : no localized cyanosis [Petechial Hemorrhages (___cm)] : no petechial hemorrhages [Skin Color & Pigmentation] : normal skin color and pigmentation [No Venous Stasis] : no venous stasis [] : no rash [Skin Lesions] : no skin lesions [No Skin Ulcers] : no skin ulcer [No Xanthoma] : no  xanthoma was observed [Deep Tendon Reflexes (DTR)] : deep tendon reflexes were 2+ and symmetric [Sensation] : the sensory exam was normal to light touch and pinprick [No Focal Deficits] : no focal deficits [Oriented To Time, Place, And Person] : oriented to person, place, and time [Impaired Insight] : insight and judgment were intact [Affect] : the affect was normal

## 2019-12-26 NOTE — HISTORY OF PRESENT ILLNESS
[FreeTextEntry1] : Mrs. Elizabeth presents for followup evaluation currently per son. She had a repeat CT scan of the chest which showed some increased, no both thickening in the right lower lobe. She has a previous history of right lower lobe non-small cell carcinoma lung at [place] knife therapy in 2013. Patient that was sent for a CT/PET scan returns for the results. She has no hemoptysis, anorexia or weight loss.

## 2020-01-08 NOTE — ED PROVIDER NOTE - CPE EDP GASTRO NORM
normal... Island Pedicle Flap With Canthal Suspension Text: The defect edges were debeveled with a #15 scalpel blade.  Given the location of the defect, shape of the defect and the proximity to free margins an island pedicle advancement flap was deemed most appropriate.  Using a sterile surgical marker, an appropriate advancement flap was drawn incorporating the defect, outlining the appropriate donor tissue and placing the expected incisions within the relaxed skin tension lines where possible. The area thus outlined was incised deep to adipose tissue with a #15 scalpel blade.  The skin margins were undermined to an appropriate distance in all directions around the primary defect and laterally outward around the island pedicle utilizing iris scissors.  There was minimal undermining beneath the pedicle flap. A suspension suture was placed in the canthal tendon to prevent tension and prevent ectropion.

## 2020-01-11 ENCOUNTER — EMERGENCY (EMERGENCY)
Facility: HOSPITAL | Age: 85
LOS: 0 days | Discharge: HOME | End: 2020-01-11
Attending: EMERGENCY MEDICINE
Payer: MEDICARE

## 2020-01-11 VITALS
SYSTOLIC BLOOD PRESSURE: 141 MMHG | TEMPERATURE: 98 F | OXYGEN SATURATION: 97 % | RESPIRATION RATE: 18 BRPM | DIASTOLIC BLOOD PRESSURE: 52 MMHG | HEART RATE: 73 BPM | WEIGHT: 132.94 LBS

## 2020-01-11 DIAGNOSIS — M79.621 PAIN IN RIGHT UPPER ARM: ICD-10-CM

## 2020-01-11 DIAGNOSIS — E78.5 HYPERLIPIDEMIA, UNSPECIFIED: ICD-10-CM

## 2020-01-11 DIAGNOSIS — S29.012A STRAIN OF MUSCLE AND TENDON OF BACK WALL OF THORAX, INITIAL ENCOUNTER: ICD-10-CM

## 2020-01-11 DIAGNOSIS — G89.29 OTHER CHRONIC PAIN: ICD-10-CM

## 2020-01-11 DIAGNOSIS — Z88.2 ALLERGY STATUS TO SULFONAMIDES: ICD-10-CM

## 2020-01-11 DIAGNOSIS — I25.10 ATHEROSCLEROTIC HEART DISEASE OF NATIVE CORONARY ARTERY WITHOUT ANGINA PECTORIS: ICD-10-CM
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
## 2020-01-11 DIAGNOSIS — I10 ESSENTIAL (PRIMARY) HYPERTENSION: ICD-10-CM

## 2020-01-11 DIAGNOSIS — Z90.89 ACQUIRED ABSENCE OF OTHER ORGANS: Chronic | ICD-10-CM

## 2020-01-11 DIAGNOSIS — K21.9 GASTRO-ESOPHAGEAL REFLUX DISEASE WITHOUT ESOPHAGITIS: ICD-10-CM

## 2020-01-11 DIAGNOSIS — Z79.899 OTHER LONG TERM (CURRENT) DRUG THERAPY: ICD-10-CM

## 2020-01-11 DIAGNOSIS — Z90.49 ACQUIRED ABSENCE OF OTHER SPECIFIED PARTS OF DIGESTIVE TRACT: Chronic | ICD-10-CM
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
## 2020-01-11 DIAGNOSIS — Z88.8 ALLERGY STATUS TO OTHER DRUGS, MEDICAMENTS AND BIOLOGICAL SUBSTANCES STATUS: ICD-10-CM

## 2020-01-11 PROCEDURE — 99283 EMERGENCY DEPT VISIT LOW MDM: CPT

## 2020-01-11 PROCEDURE — 93005 ELECTROCARDIOGRAM TRACING: CPT

## 2020-01-11 PROCEDURE — 99284 EMERGENCY DEPT VISIT MOD MDM: CPT

## 2020-01-11 PROCEDURE — 93010 ELECTROCARDIOGRAM REPORT: CPT

## 2020-01-11 RX ORDER — IBUPROFEN 200 MG
400 TABLET ORAL ONCE
Refills: 0 | Status: DISCONTINUED | OUTPATIENT
Start: 2020-01-11 | End: 2020-01-11

## 2020-01-11 RX ORDER — CYCLOBENZAPRINE HYDROCHLORIDE 10 MG/1
1 TABLET, FILM COATED ORAL
Qty: 15 | Refills: 0
Start: 2020-01-11 | End: 2020-01-15

## 2020-01-11 RX ORDER — CYCLOBENZAPRINE HYDROCHLORIDE 10 MG/1
10 TABLET, FILM COATED ORAL ONCE
Refills: 0 | Status: COMPLETED | OUTPATIENT
Start: 2020-01-11 | End: 2020-01-11

## 2020-01-11 RX ORDER — ACETAMINOPHEN 500 MG
650 TABLET ORAL ONCE
Refills: 0 | Status: COMPLETED | OUTPATIENT
Start: 2020-01-11 | End: 2020-01-11

## 2020-01-11 RX ADMIN — CYCLOBENZAPRINE HYDROCHLORIDE 10 MILLIGRAM(S): 10 TABLET, FILM COATED ORAL at 12:03

## 2020-01-11 NOTE — ED PROVIDER NOTE - PATIENT PORTAL LINK FT
You can access the FollowMyHealth Patient Portal offered by [facility] by registering at the following website: [email or web address]. By joining [employer]’s FollowMyHealth portal, you will also be able to view your health information using other applications (apps) compatible with our system.

## 2020-01-11 NOTE — ED PROVIDER NOTE - CLINICAL SUMMARY MEDICAL DECISION MAKING FREE TEXT BOX
Pain is most likely musculoskeletal given pt's reproducibility of pain and TTP but will get EKG to r/o ACS and give dose of Flexeril and Tylenol.

## 2020-01-11 NOTE — ED PROVIDER NOTE - OBJECTIVE STATEMENT
89 y/o female with PMHx CAD, HTN Carvedilol, HLD on Simvastatin, GERD on Protonix, chronic back pain, lung CA presents to the ED BIBEMS for RUE pain since last night. Pt says she was sitting in her recliner when pain began gradually. No recent falls, trauma, injury or lifting preceding symptoms. Pt says pain begins in her right shoulder and radiates to her right fingertips. Pt says she is now presenting as she read an article that says if you have heart history and feel pain in your arm you should present to ED. Denies PMHx of MI, PCI, valve replacements. PCP: Dr. Chuck Hayes. Cardiologist: Dr. Newberry. 89 y/o female with PMHx CAD, HTN Carvedilol, HLD on Simvastatin, GERD on Protonix, chronic back pain, lung CA presents to the ED BIBEMS for RUE pain since last night. Pt says she was sitting in her recliner when pain began gradually. No recent falls, trauma, injury or lifting preceding symptoms. Pt says pain begins in her right shoulder and radiates to her right fingertips. Pt says she is now presenting as she read an article that says if you have heart history and feel pain in your arm you should present to ED. Denies PMHx of MI, PCI, valve replacements. PCP: Dr. Chuck Hayes. Cardiologist: Dr. Watkins.

## 2020-01-11 NOTE — ED PROVIDER NOTE - MUSCULOSKELETAL, MLM
Spine appears normal. +Tenderness to right cervical, para-spinal, trapezius, bicep. +Pain when rotating neck to the left.

## 2020-02-05 ENCOUNTER — APPOINTMENT (OUTPATIENT)
Dept: OTOLARYNGOLOGY | Facility: CLINIC | Age: 85
End: 2020-02-05
Payer: MEDICARE

## 2020-02-05 VITALS
HEART RATE: 75 BPM | SYSTOLIC BLOOD PRESSURE: 138 MMHG | DIASTOLIC BLOOD PRESSURE: 75 MMHG | HEIGHT: 58 IN | WEIGHT: 132 LBS | BODY MASS INDEX: 27.71 KG/M2

## 2020-02-05 DIAGNOSIS — H90.3 SENSORINEURAL HEARING LOSS, BILATERAL: ICD-10-CM

## 2020-02-05 DIAGNOSIS — H61.21 IMPACTED CERUMEN, RIGHT EAR: ICD-10-CM

## 2020-02-05 DIAGNOSIS — Z80.9 FAMILY HISTORY OF MALIGNANT NEOPLASM, UNSPECIFIED: ICD-10-CM

## 2020-02-05 DIAGNOSIS — H69.83 OTHER SPECIFIED DISORDERS OF EUSTACHIAN TUBE, BILATERAL: ICD-10-CM

## 2020-02-05 PROCEDURE — 92567 TYMPANOMETRY: CPT

## 2020-02-05 PROCEDURE — 69210 REMOVE IMPACTED EAR WAX UNI: CPT

## 2020-02-05 PROCEDURE — 92557 COMPREHENSIVE HEARING TEST: CPT

## 2020-02-05 PROCEDURE — 99204 OFFICE O/P NEW MOD 45 MIN: CPT | Mod: 25

## 2020-02-05 RX ORDER — DIPHENHYDRAMINE HYDROCHLORIDE, ZINC ACETATE 20; 1 MG/G; MG/G
CREAM TOPICAL
Refills: 0 | Status: ACTIVE | COMMUNITY

## 2020-02-05 RX ORDER — MULTIVITAMIN,THER AND MINERALS
TABLET ORAL
Refills: 0 | Status: ACTIVE | COMMUNITY

## 2020-02-05 RX ORDER — OXYBUTYNIN CHLORIDE 5 MG/1
5 TABLET ORAL
Refills: 0 | Status: ACTIVE | COMMUNITY

## 2020-02-05 NOTE — REVIEW OF SYSTEMS
[Hearing Loss] : hearing loss [Easy Bruising] : a tendency for easy bruising [Negative] : Psychiatric [Patient Intake Form Reviewed] : Patient intake form was reviewed

## 2020-02-17 NOTE — CONSULT LETTER
[Consult Letter:] : I had the pleasure of evaluating your patient, [unfilled]. [Consult Closing:] : Thank you very much for allowing me to participate in the care of this patient.  If you have any questions, please do not hesitate to contact me. [Sincerely,] : Sincerely, [Please see my note below.] : Please see my note below. [Dear  ___] : Dear  [unfilled], [FreeTextEntry1] : Dear Dr. STEPHANI BYRD,\par \par Thank you for your kind referral. Please refer to my enclosed office notes for HERNANDO MUIR . If there are any questions free to contact me.\par  [FreeTextEntry3] : Malik Almonte MD, FACS\par

## 2020-02-17 NOTE — PROCEDURE
[Cerumen Impaction] : Cerumen Impaction [FreeTextEntry6] : Large amount cerumen cleared right ear instrumentation and suction.\par Ear canals and tympanic membranes  unremarkable.\par \par \par

## 2020-02-17 NOTE — PHYSICAL EXAM
[Midline] : trachea located in midline position [Normal] : no rashes [de-identified] : rt ear cerumen cleared

## 2020-02-17 NOTE — ASSESSMENT
[FreeTextEntry1] : marysen cleared ad\par audio moderate au sn loss a/a\par rec hearing aid eval\par pt to cnsider\par reviewed good listening habits

## 2020-02-17 NOTE — REASON FOR VISIT
[Initial Consultation] : an initial consultation for [Hearing Loss] : hearing loss [FreeTextEntry2] : ears

## 2020-05-27 ENCOUNTER — APPOINTMENT (OUTPATIENT)
Dept: CARDIOLOGY | Facility: CLINIC | Age: 85
End: 2020-05-27

## 2021-01-11 ENCOUNTER — APPOINTMENT (OUTPATIENT)
Dept: INTERNAL MEDICINE | Facility: CLINIC | Age: 86
End: 2021-01-11

## 2021-05-14 NOTE — H&P ADULT - HISTORY OF PRESENT ILLNESS
88 y/o F with PMHx of CAD, h/o right sided Lung ca s/p RT, HLD, and GERD, CBP, overactive bladder presenting to the  with 1 days history of  lower right leg pain and redness .  Pt was seen in ED in 09/2018 with laceration to right shin s/p slip and fall. 19 stitches were placed which have since been removed. Last night, pt started to experience erythema and pain to site of wound on RLE. Seen by PMD Dr. Cuevas today who advised pt to come into ED for evaluation, pt was taking abx for recently dx UTI.  No falls or other trauma to the leg since original injury in September. Denies any abd pain, nausea, vomiting, but has poor appetite, no fevers, chills, no CP, SOB, numbness, or weakness. Patient reports occasional dry cough and some lung sounds on and off in last couple of weeks.    in ED - T(F): 98, Max: 98.2 HR: 67 ( (67 - 77) BP: 134/67  (134/67 - 147/72) RR: 19 (19 - 19) SpO2: 100%  (100% - 100%)WBC 13, BUN 32, Cr 0.68 EKG - sinus , RBBB, no ischemic changes, CXR - no infiltrates, s/p Clindamycin in ED Floor

## 2021-08-12 NOTE — ED STATDOCS - NS ED ATTENDING STATEMENT MOD
What Type Of Note Output Would You Prefer (Optional)?: Standard Output
How Severe Is Your Rash?: moderate
Is This A New Presentation, Or A Follow-Up?: Rash
I have personally performed a face to face diagnostic evaluation on this patient. I have reviewed the ACP note and agree with the history, exam and plan of care, except as noted.

## 2022-01-01 ENCOUNTER — INPATIENT (INPATIENT)
Facility: HOSPITAL | Age: 87
LOS: 2 days | DRG: 208 | End: 2022-10-27
Attending: FAMILY MEDICINE | Admitting: INTERNAL MEDICINE
Payer: MEDICARE

## 2022-01-01 VITALS
DIASTOLIC BLOOD PRESSURE: 53 MMHG | RESPIRATION RATE: 26 BRPM | OXYGEN SATURATION: 96 % | SYSTOLIC BLOOD PRESSURE: 101 MMHG | HEART RATE: 93 BPM

## 2022-01-01 VITALS
HEIGHT: 72 IN | SYSTOLIC BLOOD PRESSURE: 131 MMHG | TEMPERATURE: 98 F | DIASTOLIC BLOOD PRESSURE: 93 MMHG | OXYGEN SATURATION: 95 % | RESPIRATION RATE: 17 BRPM | WEIGHT: 100.09 LBS | HEART RATE: 68 BPM

## 2022-01-01 DIAGNOSIS — J90 PLEURAL EFFUSION, NOT ELSEWHERE CLASSIFIED: ICD-10-CM

## 2022-01-01 DIAGNOSIS — I10 ESSENTIAL (PRIMARY) HYPERTENSION: ICD-10-CM

## 2022-01-01 DIAGNOSIS — R07.9 CHEST PAIN, UNSPECIFIED: ICD-10-CM

## 2022-01-01 DIAGNOSIS — Z90.89 ACQUIRED ABSENCE OF OTHER ORGANS: Chronic | ICD-10-CM

## 2022-01-01 DIAGNOSIS — Z90.49 ACQUIRED ABSENCE OF OTHER SPECIFIED PARTS OF DIGESTIVE TRACT: Chronic | ICD-10-CM

## 2022-01-01 LAB
ADD ON TEST-SPECIMEN IN LAB: SIGNIFICANT CHANGE UP
ADD ON TEST-SPECIMEN IN LAB: SIGNIFICANT CHANGE UP
ALBUMIN SERPL ELPH-MCNC: 2.5 G/DL — LOW (ref 3.3–5)
ALBUMIN SERPL ELPH-MCNC: 3.2 G/DL — LOW (ref 3.3–5)
ALP SERPL-CCNC: 114 U/L — SIGNIFICANT CHANGE UP (ref 40–120)
ALP SERPL-CCNC: 80 U/L — SIGNIFICANT CHANGE UP (ref 40–120)
ALT FLD-CCNC: 27 U/L — SIGNIFICANT CHANGE UP (ref 12–78)
ALT FLD-CCNC: 90 U/L — HIGH (ref 12–78)
ANION GAP SERPL CALC-SCNC: 13 MMOL/L — SIGNIFICANT CHANGE UP (ref 5–17)
ANION GAP SERPL CALC-SCNC: 5 MMOL/L — SIGNIFICANT CHANGE UP (ref 5–17)
ANION GAP SERPL CALC-SCNC: 5 MMOL/L — SIGNIFICANT CHANGE UP (ref 5–17)
ANION GAP SERPL CALC-SCNC: 7 MMOL/L — SIGNIFICANT CHANGE UP (ref 5–17)
ANION GAP SERPL CALC-SCNC: 8 MMOL/L — SIGNIFICANT CHANGE UP (ref 5–17)
ANION GAP SERPL CALC-SCNC: 9 MMOL/L — SIGNIFICANT CHANGE UP (ref 5–17)
APPEARANCE UR: ABNORMAL
AST SERPL-CCNC: 170 U/L — HIGH (ref 15–37)
AST SERPL-CCNC: 31 U/L — SIGNIFICANT CHANGE UP (ref 15–37)
BASE EXCESS BLDA CALC-SCNC: -10.5 MMOL/L — LOW (ref -2–3)
BASE EXCESS BLDA CALC-SCNC: -8.3 MMOL/L — LOW (ref -2–3)
BASE EXCESS BLDA CALC-SCNC: -9.6 MMOL/L — LOW (ref -2–3)
BASE EXCESS BLDV CALC-SCNC: -0.2 MMOL/L — SIGNIFICANT CHANGE UP
BASE EXCESS BLDV CALC-SCNC: -0.9 MMOL/L — SIGNIFICANT CHANGE UP
BASOPHILS # BLD AUTO: 0.02 K/UL — SIGNIFICANT CHANGE UP (ref 0–0.2)
BASOPHILS # BLD AUTO: 0.04 K/UL — SIGNIFICANT CHANGE UP (ref 0–0.2)
BASOPHILS # BLD AUTO: 0.05 K/UL — SIGNIFICANT CHANGE UP (ref 0–0.2)
BASOPHILS NFR BLD AUTO: 0.2 % — SIGNIFICANT CHANGE UP (ref 0–2)
BASOPHILS NFR BLD AUTO: 0.5 % — SIGNIFICANT CHANGE UP (ref 0–2)
BASOPHILS NFR BLD AUTO: 0.9 % — SIGNIFICANT CHANGE UP (ref 0–2)
BILIRUB SERPL-MCNC: 0.6 MG/DL — SIGNIFICANT CHANGE UP (ref 0.2–1.2)
BILIRUB SERPL-MCNC: 0.7 MG/DL — SIGNIFICANT CHANGE UP (ref 0.2–1.2)
BILIRUB UR-MCNC: NEGATIVE — SIGNIFICANT CHANGE UP
BLOOD GAS COMMENTS ARTERIAL: SIGNIFICANT CHANGE UP
BUN SERPL-MCNC: 25 MG/DL — HIGH (ref 7–23)
BUN SERPL-MCNC: 26 MG/DL — HIGH (ref 7–23)
BUN SERPL-MCNC: 35 MG/DL — HIGH (ref 7–23)
BUN SERPL-MCNC: 38 MG/DL — HIGH (ref 7–23)
BUN SERPL-MCNC: 41 MG/DL — HIGH (ref 7–23)
BUN SERPL-MCNC: 52 MG/DL — HIGH (ref 7–23)
CALCIUM SERPL-MCNC: 7.8 MG/DL — LOW (ref 8.5–10.1)
CALCIUM SERPL-MCNC: 8 MG/DL — LOW (ref 8.5–10.1)
CALCIUM SERPL-MCNC: 8.1 MG/DL — LOW (ref 8.5–10.1)
CALCIUM SERPL-MCNC: 8.1 MG/DL — LOW (ref 8.5–10.1)
CALCIUM SERPL-MCNC: 8.9 MG/DL — SIGNIFICANT CHANGE UP (ref 8.5–10.1)
CALCIUM SERPL-MCNC: 8.9 MG/DL — SIGNIFICANT CHANGE UP (ref 8.5–10.1)
CHLORIDE SERPL-SCNC: 101 MMOL/L — SIGNIFICANT CHANGE UP (ref 96–108)
CHLORIDE SERPL-SCNC: 105 MMOL/L — SIGNIFICANT CHANGE UP (ref 96–108)
CHLORIDE SERPL-SCNC: 105 MMOL/L — SIGNIFICANT CHANGE UP (ref 96–108)
CHLORIDE SERPL-SCNC: 106 MMOL/L — SIGNIFICANT CHANGE UP (ref 96–108)
CHLORIDE SERPL-SCNC: 106 MMOL/L — SIGNIFICANT CHANGE UP (ref 96–108)
CHLORIDE SERPL-SCNC: 108 MMOL/L — SIGNIFICANT CHANGE UP (ref 96–108)
CO2 BLDA-SCNC: 13 MMOL/L — LOW (ref 19–24)
CO2 BLDA-SCNC: 14 MMOL/L — LOW (ref 19–24)
CO2 BLDA-SCNC: 22 MMOL/L — SIGNIFICANT CHANGE UP (ref 19–24)
CO2 BLDV-SCNC: 23 MMOL/L — SIGNIFICANT CHANGE UP (ref 22–26)
CO2 BLDV-SCNC: 24 MMOL/L — SIGNIFICANT CHANGE UP (ref 22–26)
CO2 SERPL-SCNC: 20 MMOL/L — LOW (ref 22–31)
CO2 SERPL-SCNC: 23 MMOL/L — SIGNIFICANT CHANGE UP (ref 22–31)
CO2 SERPL-SCNC: 27 MMOL/L — SIGNIFICANT CHANGE UP (ref 22–31)
CO2 SERPL-SCNC: 29 MMOL/L — SIGNIFICANT CHANGE UP (ref 22–31)
COLOR SPEC: YELLOW — SIGNIFICANT CHANGE UP
CREAT SERPL-MCNC: 0.7 MG/DL — SIGNIFICANT CHANGE UP (ref 0.5–1.3)
CREAT SERPL-MCNC: 0.75 MG/DL — SIGNIFICANT CHANGE UP (ref 0.5–1.3)
CREAT SERPL-MCNC: 1.16 MG/DL — SIGNIFICANT CHANGE UP (ref 0.5–1.3)
CREAT SERPL-MCNC: 1.33 MG/DL — HIGH (ref 0.5–1.3)
CREAT SERPL-MCNC: 1.64 MG/DL — HIGH (ref 0.5–1.3)
CREAT SERPL-MCNC: 2.26 MG/DL — HIGH (ref 0.5–1.3)
D DIMER BLD IA.RAPID-MCNC: 809 NG/ML DDU — HIGH
DIFF PNL FLD: ABNORMAL
EGFR: 20 ML/MIN/1.73M2 — LOW
EGFR: 29 ML/MIN/1.73M2 — LOW
EGFR: 37 ML/MIN/1.73M2 — LOW
EGFR: 44 ML/MIN/1.73M2 — LOW
EGFR: 74 ML/MIN/1.73M2 — SIGNIFICANT CHANGE UP
EGFR: 81 ML/MIN/1.73M2 — SIGNIFICANT CHANGE UP
EOSINOPHIL # BLD AUTO: 0.01 K/UL — SIGNIFICANT CHANGE UP (ref 0–0.5)
EOSINOPHIL # BLD AUTO: 0.02 K/UL — SIGNIFICANT CHANGE UP (ref 0–0.5)
EOSINOPHIL # BLD AUTO: 0.07 K/UL — SIGNIFICANT CHANGE UP (ref 0–0.5)
EOSINOPHIL NFR BLD AUTO: 0.1 % — SIGNIFICANT CHANGE UP (ref 0–6)
EOSINOPHIL NFR BLD AUTO: 0.3 % — SIGNIFICANT CHANGE UP (ref 0–6)
EOSINOPHIL NFR BLD AUTO: 1.3 % — SIGNIFICANT CHANGE UP (ref 0–6)
GAS PNL BLDA: SIGNIFICANT CHANGE UP
GAS PNL BLDV: SIGNIFICANT CHANGE UP
GLUCOSE SERPL-MCNC: 108 MG/DL — HIGH (ref 70–99)
GLUCOSE SERPL-MCNC: 120 MG/DL — HIGH (ref 70–99)
GLUCOSE SERPL-MCNC: 130 MG/DL — HIGH (ref 70–99)
GLUCOSE SERPL-MCNC: 137 MG/DL — HIGH (ref 70–99)
GLUCOSE SERPL-MCNC: 215 MG/DL — HIGH (ref 70–99)
GLUCOSE SERPL-MCNC: 90 MG/DL — SIGNIFICANT CHANGE UP (ref 70–99)
GLUCOSE UR QL: NEGATIVE — SIGNIFICANT CHANGE UP
HCO3 BLDA-SCNC: 13 MMOL/L — LOW (ref 21–28)
HCO3 BLDA-SCNC: 14 MMOL/L — LOW (ref 21–28)
HCO3 BLDA-SCNC: 20 MMOL/L — LOW (ref 21–28)
HCO3 BLDV-SCNC: 22 MMOL/L — SIGNIFICANT CHANGE UP (ref 22–29)
HCO3 BLDV-SCNC: 23 MMOL/L — SIGNIFICANT CHANGE UP (ref 22–29)
HCT VFR BLD CALC: 35.2 % — SIGNIFICANT CHANGE UP (ref 34.5–45)
HCT VFR BLD CALC: 36.1 % — SIGNIFICANT CHANGE UP (ref 34.5–45)
HCT VFR BLD CALC: 37.9 % — SIGNIFICANT CHANGE UP (ref 34.5–45)
HCT VFR BLD CALC: 39.5 % — SIGNIFICANT CHANGE UP (ref 34.5–45)
HCT VFR BLD CALC: 40.3 % — SIGNIFICANT CHANGE UP (ref 34.5–45)
HGB BLD-MCNC: 11.5 G/DL — SIGNIFICANT CHANGE UP (ref 11.5–15.5)
HGB BLD-MCNC: 11.7 G/DL — SIGNIFICANT CHANGE UP (ref 11.5–15.5)
HGB BLD-MCNC: 11.8 G/DL — SIGNIFICANT CHANGE UP (ref 11.5–15.5)
HGB BLD-MCNC: 12.3 G/DL — SIGNIFICANT CHANGE UP (ref 11.5–15.5)
HGB BLD-MCNC: 12.6 G/DL — SIGNIFICANT CHANGE UP (ref 11.5–15.5)
IMM GRANULOCYTES NFR BLD AUTO: 0.3 % — SIGNIFICANT CHANGE UP (ref 0–0.9)
IMM GRANULOCYTES NFR BLD AUTO: 0.4 % — SIGNIFICANT CHANGE UP (ref 0–0.9)
IMM GRANULOCYTES NFR BLD AUTO: 0.5 % — SIGNIFICANT CHANGE UP (ref 0–0.9)
KETONES UR-MCNC: ABNORMAL
LACTATE SERPL-SCNC: 1.7 MMOL/L — SIGNIFICANT CHANGE UP (ref 0.7–2)
LACTATE SERPL-SCNC: 2.5 MMOL/L — HIGH (ref 0.7–2)
LACTATE SERPL-SCNC: 8.6 MMOL/L — CRITICAL HIGH (ref 0.7–2)
LDH SERPL L TO P-CCNC: 179 U/L — SIGNIFICANT CHANGE UP (ref 84–241)
LEUKOCYTE ESTERASE UR-ACNC: ABNORMAL
LYMPHOCYTES # BLD AUTO: 0.77 K/UL — LOW (ref 1–3.3)
LYMPHOCYTES # BLD AUTO: 0.92 K/UL — LOW (ref 1–3.3)
LYMPHOCYTES # BLD AUTO: 1.06 K/UL — SIGNIFICANT CHANGE UP (ref 1–3.3)
LYMPHOCYTES # BLD AUTO: 11.8 % — LOW (ref 13–44)
LYMPHOCYTES # BLD AUTO: 18.9 % — SIGNIFICANT CHANGE UP (ref 13–44)
LYMPHOCYTES # BLD AUTO: 8.3 % — LOW (ref 13–44)
MAGNESIUM SERPL-MCNC: 2.1 MG/DL — SIGNIFICANT CHANGE UP (ref 1.6–2.6)
MAGNESIUM SERPL-MCNC: 2.1 MG/DL — SIGNIFICANT CHANGE UP (ref 1.6–2.6)
MAGNESIUM SERPL-MCNC: 2.2 MG/DL — SIGNIFICANT CHANGE UP (ref 1.6–2.6)
MAGNESIUM SERPL-MCNC: 2.2 MG/DL — SIGNIFICANT CHANGE UP (ref 1.6–2.6)
MCHC RBC-ENTMCNC: 30.2 PG — SIGNIFICANT CHANGE UP (ref 27–34)
MCHC RBC-ENTMCNC: 30.2 PG — SIGNIFICANT CHANGE UP (ref 27–34)
MCHC RBC-ENTMCNC: 30.3 PG — SIGNIFICANT CHANGE UP (ref 27–34)
MCHC RBC-ENTMCNC: 30.4 PG — SIGNIFICANT CHANGE UP (ref 27–34)
MCHC RBC-ENTMCNC: 30.5 PG — SIGNIFICANT CHANGE UP (ref 27–34)
MCHC RBC-ENTMCNC: 30.9 GM/DL — LOW (ref 32–36)
MCHC RBC-ENTMCNC: 31.1 GM/DL — LOW (ref 32–36)
MCHC RBC-ENTMCNC: 31.3 GM/DL — LOW (ref 32–36)
MCHC RBC-ENTMCNC: 32.7 GM/DL — SIGNIFICANT CHANGE UP (ref 32–36)
MCHC RBC-ENTMCNC: 32.7 GM/DL — SIGNIFICANT CHANGE UP (ref 32–36)
MCV RBC AUTO: 92.6 FL — SIGNIFICANT CHANGE UP (ref 80–100)
MCV RBC AUTO: 93.4 FL — SIGNIFICANT CHANGE UP (ref 80–100)
MCV RBC AUTO: 97.1 FL — SIGNIFICANT CHANGE UP (ref 80–100)
MCV RBC AUTO: 97.3 FL — SIGNIFICANT CHANGE UP (ref 80–100)
MCV RBC AUTO: 97.9 FL — SIGNIFICANT CHANGE UP (ref 80–100)
MONOCYTES # BLD AUTO: 0.63 K/UL — SIGNIFICANT CHANGE UP (ref 0–0.9)
MONOCYTES # BLD AUTO: 0.82 K/UL — SIGNIFICANT CHANGE UP (ref 0–0.9)
MONOCYTES # BLD AUTO: 0.85 K/UL — SIGNIFICANT CHANGE UP (ref 0–0.9)
MONOCYTES NFR BLD AUTO: 10.5 % — SIGNIFICANT CHANGE UP (ref 2–14)
MONOCYTES NFR BLD AUTO: 11.3 % — SIGNIFICANT CHANGE UP (ref 2–14)
MONOCYTES NFR BLD AUTO: 9.1 % — SIGNIFICANT CHANGE UP (ref 2–14)
NEUTROPHILS # BLD AUTO: 3.77 K/UL — SIGNIFICANT CHANGE UP (ref 1.8–7.4)
NEUTROPHILS # BLD AUTO: 6 K/UL — SIGNIFICANT CHANGE UP (ref 1.8–7.4)
NEUTROPHILS # BLD AUTO: 7.62 K/UL — HIGH (ref 1.8–7.4)
NEUTROPHILS NFR BLD AUTO: 67.2 % — SIGNIFICANT CHANGE UP (ref 43–77)
NEUTROPHILS NFR BLD AUTO: 76.6 % — SIGNIFICANT CHANGE UP (ref 43–77)
NEUTROPHILS NFR BLD AUTO: 81.8 % — HIGH (ref 43–77)
NITRITE UR-MCNC: NEGATIVE — SIGNIFICANT CHANGE UP
PCO2 BLDA: 20 MMHG — LOW (ref 32–35)
PCO2 BLDA: 25 MMHG — LOW (ref 32–35)
PCO2 BLDA: 55 MMHG — HIGH (ref 32–35)
PCO2 BLDV: 31 MMHG — LOW (ref 39–42)
PCO2 BLDV: 31 MMHG — LOW (ref 39–42)
PH BLDA: 7.18 — CRITICAL LOW (ref 7.35–7.45)
PH BLDA: 7.34 — LOW (ref 7.35–7.45)
PH BLDA: 7.41 — SIGNIFICANT CHANGE UP (ref 7.35–7.45)
PH BLDV: 7.46 — HIGH (ref 7.32–7.43)
PH BLDV: 7.47 — HIGH (ref 7.32–7.43)
PH UR: 5 — SIGNIFICANT CHANGE UP (ref 5–8)
PHOSPHATE SERPL-MCNC: 4.1 MG/DL — SIGNIFICANT CHANGE UP (ref 2.5–4.5)
PHOSPHATE SERPL-MCNC: 5.3 MG/DL — HIGH (ref 2.5–4.5)
PHOSPHATE SERPL-MCNC: 6.7 MG/DL — HIGH (ref 2.5–4.5)
PLATELET # BLD AUTO: 142 K/UL — LOW (ref 150–400)
PLATELET # BLD AUTO: 142 K/UL — LOW (ref 150–400)
PLATELET # BLD AUTO: 167 K/UL — SIGNIFICANT CHANGE UP (ref 150–400)
PLATELET # BLD AUTO: 203 K/UL — SIGNIFICANT CHANGE UP (ref 150–400)
PLATELET # BLD AUTO: 223 K/UL — SIGNIFICANT CHANGE UP (ref 150–400)
PO2 BLDA: 219 MMHG — HIGH (ref 83–108)
PO2 BLDA: 278 MMHG — HIGH (ref 83–108)
PO2 BLDA: 45 MMHG — CRITICAL LOW (ref 83–108)
PO2 BLDV: 179 MMHG — SIGNIFICANT CHANGE UP
PO2 BLDV: 208 MMHG — SIGNIFICANT CHANGE UP
POTASSIUM SERPL-MCNC: 4.1 MMOL/L — SIGNIFICANT CHANGE UP (ref 3.5–5.3)
POTASSIUM SERPL-MCNC: 4.4 MMOL/L — SIGNIFICANT CHANGE UP (ref 3.5–5.3)
POTASSIUM SERPL-MCNC: 4.7 MMOL/L — SIGNIFICANT CHANGE UP (ref 3.5–5.3)
POTASSIUM SERPL-MCNC: 4.7 MMOL/L — SIGNIFICANT CHANGE UP (ref 3.5–5.3)
POTASSIUM SERPL-MCNC: 5 MMOL/L — SIGNIFICANT CHANGE UP (ref 3.5–5.3)
POTASSIUM SERPL-MCNC: 5.1 MMOL/L — SIGNIFICANT CHANGE UP (ref 3.5–5.3)
POTASSIUM SERPL-SCNC: 4.1 MMOL/L — SIGNIFICANT CHANGE UP (ref 3.5–5.3)
POTASSIUM SERPL-SCNC: 4.4 MMOL/L — SIGNIFICANT CHANGE UP (ref 3.5–5.3)
POTASSIUM SERPL-SCNC: 4.7 MMOL/L — SIGNIFICANT CHANGE UP (ref 3.5–5.3)
POTASSIUM SERPL-SCNC: 4.7 MMOL/L — SIGNIFICANT CHANGE UP (ref 3.5–5.3)
POTASSIUM SERPL-SCNC: 5 MMOL/L — SIGNIFICANT CHANGE UP (ref 3.5–5.3)
POTASSIUM SERPL-SCNC: 5.1 MMOL/L — SIGNIFICANT CHANGE UP (ref 3.5–5.3)
PROT SERPL-MCNC: 6.6 GM/DL — SIGNIFICANT CHANGE UP (ref 6–8.3)
PROT SERPL-MCNC: 7.8 GM/DL — SIGNIFICANT CHANGE UP (ref 6–8.3)
PROT UR-MCNC: 100
RAPID RVP RESULT: SIGNIFICANT CHANGE UP
RBC # BLD: 3.77 M/UL — LOW (ref 3.8–5.2)
RBC # BLD: 3.87 M/UL — SIGNIFICANT CHANGE UP (ref 3.8–5.2)
RBC # BLD: 3.9 M/UL — SIGNIFICANT CHANGE UP (ref 3.8–5.2)
RBC # BLD: 4.07 M/UL — SIGNIFICANT CHANGE UP (ref 3.8–5.2)
RBC # BLD: 4.14 M/UL — SIGNIFICANT CHANGE UP (ref 3.8–5.2)
RBC # FLD: 13.4 % — SIGNIFICANT CHANGE UP (ref 10.3–14.5)
RBC # FLD: 13.6 % — SIGNIFICANT CHANGE UP (ref 10.3–14.5)
RBC # FLD: 13.7 % — SIGNIFICANT CHANGE UP (ref 10.3–14.5)
RBC # FLD: 13.7 % — SIGNIFICANT CHANGE UP (ref 10.3–14.5)
RBC # FLD: 13.8 % — SIGNIFICANT CHANGE UP (ref 10.3–14.5)
SAO2 % BLDA: 100 % — SIGNIFICANT CHANGE UP
SAO2 % BLDA: 58 % — SIGNIFICANT CHANGE UP
SAO2 % BLDA: 99 % — SIGNIFICANT CHANGE UP
SAO2 % BLDV: 99.8 % — SIGNIFICANT CHANGE UP
SAO2 % BLDV: 99.9 % — SIGNIFICANT CHANGE UP
SARS-COV-2 RNA SPEC QL NAA+PROBE: SIGNIFICANT CHANGE UP
SODIUM SERPL-SCNC: 133 MMOL/L — LOW (ref 135–145)
SODIUM SERPL-SCNC: 135 MMOL/L — SIGNIFICANT CHANGE UP (ref 135–145)
SODIUM SERPL-SCNC: 136 MMOL/L — SIGNIFICANT CHANGE UP (ref 135–145)
SODIUM SERPL-SCNC: 139 MMOL/L — SIGNIFICANT CHANGE UP (ref 135–145)
SODIUM SERPL-SCNC: 140 MMOL/L — SIGNIFICANT CHANGE UP (ref 135–145)
SODIUM SERPL-SCNC: 140 MMOL/L — SIGNIFICANT CHANGE UP (ref 135–145)
SP GR SPEC: 1.01 — SIGNIFICANT CHANGE UP (ref 1.01–1.02)
TROPONIN I, HIGH SENSITIVITY RESULT: 14.4 NG/L — SIGNIFICANT CHANGE UP
TROPONIN I, HIGH SENSITIVITY RESULT: 14.72 NG/L — SIGNIFICANT CHANGE UP
TROPONIN I, HIGH SENSITIVITY RESULT: 15.63 NG/L — SIGNIFICANT CHANGE UP
TROPONIN I, HIGH SENSITIVITY RESULT: 15.86 NG/L — SIGNIFICANT CHANGE UP
TROPONIN I, HIGH SENSITIVITY RESULT: 235.66 NG/L — HIGH
UROBILINOGEN FLD QL: NEGATIVE — SIGNIFICANT CHANGE UP
WBC # BLD: 5.6 K/UL — SIGNIFICANT CHANGE UP (ref 3.8–10.5)
WBC # BLD: 7.82 K/UL — SIGNIFICANT CHANGE UP (ref 3.8–10.5)
WBC # BLD: 7.96 K/UL — SIGNIFICANT CHANGE UP (ref 3.8–10.5)
WBC # BLD: 8.42 K/UL — SIGNIFICANT CHANGE UP (ref 3.8–10.5)
WBC # BLD: 9.32 K/UL — SIGNIFICANT CHANGE UP (ref 3.8–10.5)
WBC # FLD AUTO: 5.6 K/UL — SIGNIFICANT CHANGE UP (ref 3.8–10.5)
WBC # FLD AUTO: 7.82 K/UL — SIGNIFICANT CHANGE UP (ref 3.8–10.5)
WBC # FLD AUTO: 7.96 K/UL — SIGNIFICANT CHANGE UP (ref 3.8–10.5)
WBC # FLD AUTO: 8.42 K/UL — SIGNIFICANT CHANGE UP (ref 3.8–10.5)
WBC # FLD AUTO: 9.32 K/UL — SIGNIFICANT CHANGE UP (ref 3.8–10.5)

## 2022-01-01 PROCEDURE — 76770 US EXAM ABDO BACK WALL COMP: CPT

## 2022-01-01 PROCEDURE — 99292 CRITICAL CARE ADDL 30 MIN: CPT

## 2022-01-01 PROCEDURE — 93970 EXTREMITY STUDY: CPT

## 2022-01-01 PROCEDURE — 93010 ELECTROCARDIOGRAM REPORT: CPT

## 2022-01-01 PROCEDURE — 99231 SBSQ HOSP IP/OBS SF/LOW 25: CPT

## 2022-01-01 PROCEDURE — 82962 GLUCOSE BLOOD TEST: CPT

## 2022-01-01 PROCEDURE — 80053 COMPREHEN METABOLIC PANEL: CPT

## 2022-01-01 PROCEDURE — 94003 VENT MGMT INPAT SUBQ DAY: CPT

## 2022-01-01 PROCEDURE — 71045 X-RAY EXAM CHEST 1 VIEW: CPT

## 2022-01-01 PROCEDURE — 99233 SBSQ HOSP IP/OBS HIGH 50: CPT

## 2022-01-01 PROCEDURE — 82803 BLOOD GASES ANY COMBINATION: CPT

## 2022-01-01 PROCEDURE — 93306 TTE W/DOPPLER COMPLETE: CPT

## 2022-01-01 PROCEDURE — 99223 1ST HOSP IP/OBS HIGH 75: CPT

## 2022-01-01 PROCEDURE — 93306 TTE W/DOPPLER COMPLETE: CPT | Mod: 26

## 2022-01-01 PROCEDURE — 71110 X-RAY EXAM RIBS BIL 3 VIEWS: CPT

## 2022-01-01 PROCEDURE — 94002 VENT MGMT INPAT INIT DAY: CPT

## 2022-01-01 PROCEDURE — 93010 ELECTROCARDIOGRAM REPORT: CPT | Mod: 76

## 2022-01-01 PROCEDURE — 83605 ASSAY OF LACTIC ACID: CPT

## 2022-01-01 PROCEDURE — 71045 X-RAY EXAM CHEST 1 VIEW: CPT | Mod: 26

## 2022-01-01 PROCEDURE — 93005 ELECTROCARDIOGRAM TRACING: CPT

## 2022-01-01 PROCEDURE — 36600 WITHDRAWAL OF ARTERIAL BLOOD: CPT

## 2022-01-01 PROCEDURE — 76770 US EXAM ABDO BACK WALL COMP: CPT | Mod: 26

## 2022-01-01 PROCEDURE — 71110 X-RAY EXAM RIBS BIL 3 VIEWS: CPT | Mod: 26

## 2022-01-01 PROCEDURE — 84484 ASSAY OF TROPONIN QUANT: CPT

## 2022-01-01 PROCEDURE — 80048 BASIC METABOLIC PNL TOTAL CA: CPT

## 2022-01-01 PROCEDURE — 85025 COMPLETE CBC W/AUTO DIFF WBC: CPT

## 2022-01-01 PROCEDURE — 83615 LACTATE (LD) (LDH) ENZYME: CPT

## 2022-01-01 PROCEDURE — 84100 ASSAY OF PHOSPHORUS: CPT

## 2022-01-01 PROCEDURE — 99291 CRITICAL CARE FIRST HOUR: CPT

## 2022-01-01 PROCEDURE — 83880 ASSAY OF NATRIURETIC PEPTIDE: CPT

## 2022-01-01 PROCEDURE — 83735 ASSAY OF MAGNESIUM: CPT

## 2022-01-01 PROCEDURE — 87040 BLOOD CULTURE FOR BACTERIA: CPT

## 2022-01-01 PROCEDURE — 85027 COMPLETE CBC AUTOMATED: CPT

## 2022-01-01 PROCEDURE — 71250 CT THORAX DX C-: CPT | Mod: 26,MA

## 2022-01-01 PROCEDURE — 85379 FIBRIN DEGRADATION QUANT: CPT

## 2022-01-01 PROCEDURE — 93970 EXTREMITY STUDY: CPT | Mod: 26

## 2022-01-01 PROCEDURE — 99285 EMERGENCY DEPT VISIT HI MDM: CPT | Mod: CS

## 2022-01-01 PROCEDURE — 36415 COLL VENOUS BLD VENIPUNCTURE: CPT

## 2022-01-01 PROCEDURE — 71045 X-RAY EXAM CHEST 1 VIEW: CPT | Mod: 26,59

## 2022-01-01 PROCEDURE — 97163 PT EVAL HIGH COMPLEX 45 MIN: CPT | Mod: GP

## 2022-01-01 PROCEDURE — 97116 GAIT TRAINING THERAPY: CPT | Mod: GP

## 2022-01-01 PROCEDURE — 99222 1ST HOSP IP/OBS MODERATE 55: CPT

## 2022-01-01 PROCEDURE — 81001 URINALYSIS AUTO W/SCOPE: CPT

## 2022-01-01 RX ORDER — ONDANSETRON 8 MG/1
4 TABLET, FILM COATED ORAL EVERY 8 HOURS
Refills: 0 | Status: ACTIVE | OUTPATIENT
Start: 2022-01-01 | End: 2023-09-23

## 2022-01-01 RX ORDER — ROBINUL 0.2 MG/ML
0.2 INJECTION INTRAMUSCULAR; INTRAVENOUS EVERY 8 HOURS
Refills: 0 | Status: DISCONTINUED | OUTPATIENT
Start: 2022-01-01 | End: 2022-01-01

## 2022-01-01 RX ORDER — ALBUTEROL 90 UG/1
2 AEROSOL, METERED ORAL EVERY 6 HOURS
Refills: 0 | Status: ACTIVE | OUTPATIENT
Start: 2022-01-01 | End: 2023-09-23

## 2022-01-01 RX ORDER — NOREPINEPHRINE BITARTRATE/D5W 8 MG/250ML
0.05 PLASTIC BAG, INJECTION (ML) INTRAVENOUS
Qty: 8 | Refills: 0 | Status: ACTIVE | OUTPATIENT
Start: 2022-01-01 | End: 2023-09-23

## 2022-01-01 RX ORDER — BNT162B2 ORIGINAL AND OMICRON BA.4/BA.5 .1125; .1125 MG/2.25ML; MG/2.25ML
0.3 INJECTION, SUSPENSION INTRAMUSCULAR ONCE
Refills: 0 | Status: ACTIVE | OUTPATIENT
Start: 2022-01-01 | End: 2023-09-23

## 2022-01-01 RX ORDER — CHLORHEXIDINE GLUCONATE 213 G/1000ML
15 SOLUTION TOPICAL EVERY 12 HOURS
Refills: 0 | Status: ACTIVE | OUTPATIENT
Start: 2022-01-01 | End: 2023-09-23

## 2022-01-01 RX ORDER — ACETAMINOPHEN 500 MG
1000 TABLET ORAL ONCE
Refills: 0 | Status: COMPLETED | OUTPATIENT
Start: 2022-01-01 | End: 2022-01-01

## 2022-01-01 RX ORDER — ENOXAPARIN SODIUM 100 MG/ML
40 INJECTION SUBCUTANEOUS EVERY 24 HOURS
Refills: 0 | Status: ACTIVE | OUTPATIENT
Start: 2022-01-01 | End: 2023-09-23

## 2022-01-01 RX ORDER — CARVEDILOL PHOSPHATE 80 MG/1
1 CAPSULE, EXTENDED RELEASE ORAL
Qty: 0 | Refills: 0 | DISCHARGE

## 2022-01-01 RX ORDER — PROPOFOL 10 MG/ML
10 INJECTION, EMULSION INTRAVENOUS
Qty: 500 | Refills: 0 | Status: ACTIVE | OUTPATIENT
Start: 2022-01-01 | End: 2023-09-23

## 2022-01-01 RX ORDER — FAMOTIDINE 10 MG/ML
1 INJECTION INTRAVENOUS
Qty: 0 | Refills: 0 | DISCHARGE

## 2022-01-01 RX ORDER — FIBER
1 TABLET,CHEWABLE ORAL
Qty: 0 | Refills: 0 | DISCHARGE

## 2022-01-01 RX ORDER — LANOLIN ALCOHOL/MO/W.PET/CERES
3 CREAM (GRAM) TOPICAL AT BEDTIME
Refills: 0 | Status: ACTIVE | OUTPATIENT
Start: 2022-01-01 | End: 2023-09-23

## 2022-01-01 RX ORDER — FAMOTIDINE 10 MG/ML
20 INJECTION INTRAVENOUS AT BEDTIME
Refills: 0 | Status: ACTIVE | OUTPATIENT
Start: 2022-01-01 | End: 2023-09-23

## 2022-01-01 RX ORDER — HYDROMORPHONE HYDROCHLORIDE 2 MG/ML
1 INJECTION INTRAMUSCULAR; INTRAVENOUS; SUBCUTANEOUS
Qty: 100 | Refills: 0 | Status: DISCONTINUED | OUTPATIENT
Start: 2022-01-01 | End: 2022-01-01

## 2022-01-01 RX ORDER — ASCORBIC ACID 60 MG
1 TABLET,CHEWABLE ORAL
Qty: 0 | Refills: 0 | DISCHARGE

## 2022-01-01 RX ORDER — PIPERACILLIN AND TAZOBACTAM 4; .5 G/20ML; G/20ML
3.38 INJECTION, POWDER, LYOPHILIZED, FOR SOLUTION INTRAVENOUS ONCE
Refills: 0 | Status: COMPLETED | OUTPATIENT
Start: 2022-01-01 | End: 2022-01-01

## 2022-01-01 RX ORDER — SIMVASTATIN 20 MG/1
1 TABLET, FILM COATED ORAL
Qty: 0 | Refills: 0 | DISCHARGE

## 2022-01-01 RX ORDER — CHOLECALCIFEROL (VITAMIN D3) 125 MCG
1 CAPSULE ORAL
Qty: 0 | Refills: 0 | DISCHARGE

## 2022-01-01 RX ORDER — PYRIDOXINE HCL (VITAMIN B6) 100 MG
1 TABLET ORAL
Qty: 0 | Refills: 0 | DISCHARGE

## 2022-01-01 RX ORDER — ACETAMINOPHEN 500 MG
650 TABLET ORAL EVERY 6 HOURS
Refills: 0 | Status: ACTIVE | OUTPATIENT
Start: 2022-01-01 | End: 2023-09-22

## 2022-01-01 RX ORDER — TIOTROPIUM BROMIDE 18 UG/1
1 CAPSULE ORAL; RESPIRATORY (INHALATION) DAILY
Refills: 0 | Status: ACTIVE | OUTPATIENT
Start: 2022-01-01 | End: 2023-09-23

## 2022-01-01 RX ORDER — ACETAMINOPHEN 500 MG
1 TABLET ORAL
Qty: 0 | Refills: 0 | DISCHARGE

## 2022-01-01 RX ORDER — FUROSEMIDE 40 MG
60 TABLET ORAL EVERY 12 HOURS
Refills: 0 | Status: ACTIVE | OUTPATIENT
Start: 2022-01-01 | End: 2023-09-24

## 2022-01-01 RX ORDER — ACETAMINOPHEN 500 MG
2 TABLET ORAL
Qty: 0 | Refills: 0 | DISCHARGE

## 2022-01-01 RX ORDER — BUDESONIDE AND FORMOTEROL FUMARATE DIHYDRATE 160; 4.5 UG/1; UG/1
2 AEROSOL RESPIRATORY (INHALATION)
Refills: 0 | Status: ACTIVE | OUTPATIENT
Start: 2022-01-01 | End: 2023-09-23

## 2022-01-01 RX ORDER — LACTOBACILLUS ACIDOPHILUS 100MM CELL
1 CAPSULE ORAL
Qty: 0 | Refills: 0 | DISCHARGE

## 2022-01-01 RX ORDER — SODIUM CHLORIDE 9 MG/ML
1000 INJECTION, SOLUTION INTRAVENOUS
Refills: 0 | Status: DISCONTINUED | OUTPATIENT
Start: 2022-01-01 | End: 2022-01-01

## 2022-01-01 RX ORDER — OXYBUTYNIN CHLORIDE 5 MG
1 TABLET ORAL
Qty: 0 | Refills: 0 | DISCHARGE

## 2022-01-01 RX ORDER — DEXMEDETOMIDINE HYDROCHLORIDE IN 0.9% SODIUM CHLORIDE 4 UG/ML
0.2 INJECTION INTRAVENOUS
Qty: 200 | Refills: 0 | Status: DISCONTINUED | OUTPATIENT
Start: 2022-01-01 | End: 2022-01-01

## 2022-01-01 RX ORDER — TAMSULOSIN HYDROCHLORIDE 0.4 MG/1
0.4 CAPSULE ORAL AT BEDTIME
Refills: 0 | Status: ACTIVE | OUTPATIENT
Start: 2022-01-01 | End: 2023-09-23

## 2022-01-01 RX ORDER — GLUCOSAMINE/MSM/CHONDROITIN A 750-375MG
1 TABLET ORAL
Qty: 0 | Refills: 0 | DISCHARGE

## 2022-01-01 RX ORDER — VITAMIN E 100 UNIT
1 CAPSULE ORAL
Qty: 0 | Refills: 0 | DISCHARGE

## 2022-01-01 RX ORDER — DONEPEZIL HYDROCHLORIDE 10 MG/1
1 TABLET, FILM COATED ORAL
Qty: 0 | Refills: 0 | DISCHARGE

## 2022-01-01 RX ORDER — HYDROMORPHONE HYDROCHLORIDE 2 MG/ML
2 INJECTION INTRAMUSCULAR; INTRAVENOUS; SUBCUTANEOUS
Refills: 0 | Status: DISCONTINUED | OUTPATIENT
Start: 2022-01-01 | End: 2022-01-01

## 2022-01-01 RX ORDER — OMEGA-3 ACID ETHYL ESTERS 1 G
1 CAPSULE ORAL
Qty: 0 | Refills: 0 | DISCHARGE

## 2022-01-01 RX ORDER — INFLUENZA VIRUS VACCINE 15; 15; 15; 15 UG/.5ML; UG/.5ML; UG/.5ML; UG/.5ML
0.7 SUSPENSION INTRAMUSCULAR ONCE
Refills: 0 | Status: ACTIVE | OUTPATIENT
Start: 2022-01-01 | End: 2023-09-23

## 2022-01-01 RX ORDER — CEFTRIAXONE 500 MG/1
1000 INJECTION, POWDER, FOR SOLUTION INTRAMUSCULAR; INTRAVENOUS ONCE
Refills: 0 | Status: COMPLETED | OUTPATIENT
Start: 2022-01-01 | End: 2022-01-01

## 2022-01-01 RX ORDER — ASPIRIN/CALCIUM CARB/MAGNESIUM 324 MG
324 TABLET ORAL ONCE
Refills: 0 | Status: COMPLETED | OUTPATIENT
Start: 2022-01-01 | End: 2022-01-01

## 2022-01-01 RX ORDER — AZITHROMYCIN 500 MG/1
500 TABLET, FILM COATED ORAL ONCE
Refills: 0 | Status: COMPLETED | OUTPATIENT
Start: 2022-01-01 | End: 2022-01-01

## 2022-01-01 RX ORDER — SIMVASTATIN 20 MG/1
20 TABLET, FILM COATED ORAL AT BEDTIME
Refills: 0 | Status: ACTIVE | OUTPATIENT
Start: 2022-01-01 | End: 2023-09-23

## 2022-01-01 RX ORDER — TAMSULOSIN HYDROCHLORIDE 0.4 MG/1
1 CAPSULE ORAL
Qty: 0 | Refills: 0 | DISCHARGE

## 2022-01-01 RX ORDER — CEFTRIAXONE 500 MG/1
1000 INJECTION, POWDER, FOR SOLUTION INTRAMUSCULAR; INTRAVENOUS EVERY 24 HOURS
Refills: 0 | Status: DISCONTINUED | OUTPATIENT
Start: 2022-01-01 | End: 2022-01-01

## 2022-01-01 RX ORDER — AZITHROMYCIN 500 MG/1
500 TABLET, FILM COATED ORAL EVERY 24 HOURS
Refills: 0 | Status: DISCONTINUED | OUTPATIENT
Start: 2022-01-01 | End: 2022-01-01

## 2022-01-01 RX ORDER — HYDROMORPHONE HYDROCHLORIDE 2 MG/ML
1 INJECTION INTRAMUSCULAR; INTRAVENOUS; SUBCUTANEOUS
Qty: 10 | Refills: 0 | Status: DISCONTINUED | OUTPATIENT
Start: 2022-01-01 | End: 2022-01-01

## 2022-01-01 RX ORDER — PIPERACILLIN AND TAZOBACTAM 4; .5 G/20ML; G/20ML
3.38 INJECTION, POWDER, LYOPHILIZED, FOR SOLUTION INTRAVENOUS EVERY 8 HOURS
Refills: 0 | Status: ACTIVE | OUTPATIENT
Start: 2022-01-01 | End: 2022-11-02

## 2022-01-01 RX ORDER — CARVEDILOL PHOSPHATE 80 MG/1
6.25 CAPSULE, EXTENDED RELEASE ORAL EVERY 12 HOURS
Refills: 0 | Status: DISCONTINUED | OUTPATIENT
Start: 2022-01-01 | End: 2022-01-01

## 2022-01-01 RX ORDER — ACETAMINOPHEN 500 MG
650 TABLET ORAL EVERY 6 HOURS
Refills: 0 | Status: ACTIVE | OUTPATIENT
Start: 2022-01-01 | End: 2023-09-23

## 2022-01-01 RX ORDER — SODIUM CHLORIDE 9 MG/ML
1000 INJECTION, SOLUTION INTRAVENOUS ONCE
Refills: 0 | Status: COMPLETED | OUTPATIENT
Start: 2022-01-01 | End: 2022-01-01

## 2022-01-01 RX ADMIN — CARVEDILOL PHOSPHATE 6.25 MILLIGRAM(S): 80 CAPSULE, EXTENDED RELEASE ORAL at 09:17

## 2022-01-01 RX ADMIN — ENOXAPARIN SODIUM 40 MILLIGRAM(S): 100 INJECTION SUBCUTANEOUS at 06:02

## 2022-01-01 RX ADMIN — PROPOFOL 2.72 MICROGRAM(S)/KG/MIN: 10 INJECTION, EMULSION INTRAVENOUS at 03:57

## 2022-01-01 RX ADMIN — SODIUM CHLORIDE 1000 MILLILITER(S): 9 INJECTION, SOLUTION INTRAVENOUS at 23:54

## 2022-01-01 RX ADMIN — TAMSULOSIN HYDROCHLORIDE 0.4 MILLIGRAM(S): 0.4 CAPSULE ORAL at 21:45

## 2022-01-01 RX ADMIN — SODIUM CHLORIDE 75 MILLILITER(S): 9 INJECTION, SOLUTION INTRAVENOUS at 20:40

## 2022-01-01 RX ADMIN — AZITHROMYCIN 255 MILLIGRAM(S): 500 TABLET, FILM COATED ORAL at 17:56

## 2022-01-01 RX ADMIN — ENOXAPARIN SODIUM 40 MILLIGRAM(S): 100 INJECTION SUBCUTANEOUS at 06:09

## 2022-01-01 RX ADMIN — PIPERACILLIN AND TAZOBACTAM 25 GRAM(S): 4; .5 INJECTION, POWDER, LYOPHILIZED, FOR SOLUTION INTRAVENOUS at 22:26

## 2022-01-01 RX ADMIN — Medication 1200 MILLIGRAM(S): at 10:04

## 2022-01-01 RX ADMIN — PROPOFOL 2.72 MICROGRAM(S)/KG/MIN: 10 INJECTION, EMULSION INTRAVENOUS at 20:08

## 2022-01-01 RX ADMIN — DEXMEDETOMIDINE HYDROCHLORIDE IN 0.9% SODIUM CHLORIDE 2.27 MICROGRAM(S)/KG/HR: 4 INJECTION INTRAVENOUS at 11:44

## 2022-01-01 RX ADMIN — Medication 1000 MILLIGRAM(S): at 21:53

## 2022-01-01 RX ADMIN — FAMOTIDINE 20 MILLIGRAM(S): 10 INJECTION INTRAVENOUS at 21:45

## 2022-01-01 RX ADMIN — CHLORHEXIDINE GLUCONATE 15 MILLILITER(S): 213 SOLUTION TOPICAL at 10:03

## 2022-01-01 RX ADMIN — CHLORHEXIDINE GLUCONATE 15 MILLILITER(S): 213 SOLUTION TOPICAL at 10:56

## 2022-01-01 RX ADMIN — Medication 650 MILLIGRAM(S): at 14:48

## 2022-01-01 RX ADMIN — CHLORHEXIDINE GLUCONATE 15 MILLILITER(S): 213 SOLUTION TOPICAL at 22:30

## 2022-01-01 RX ADMIN — HYDROMORPHONE HYDROCHLORIDE 2 MILLIGRAM(S): 2 INJECTION INTRAMUSCULAR; INTRAVENOUS; SUBCUTANEOUS at 13:17

## 2022-01-01 RX ADMIN — AZITHROMYCIN 500 MILLIGRAM(S): 500 TABLET, FILM COATED ORAL at 21:53

## 2022-01-01 RX ADMIN — ENOXAPARIN SODIUM 40 MILLIGRAM(S): 100 INJECTION SUBCUTANEOUS at 05:54

## 2022-01-01 RX ADMIN — Medication 60 MILLIGRAM(S): at 05:54

## 2022-01-01 RX ADMIN — PIPERACILLIN AND TAZOBACTAM 25 GRAM(S): 4; .5 INJECTION, POWDER, LYOPHILIZED, FOR SOLUTION INTRAVENOUS at 06:02

## 2022-01-01 RX ADMIN — PIPERACILLIN AND TAZOBACTAM 25 GRAM(S): 4; .5 INJECTION, POWDER, LYOPHILIZED, FOR SOLUTION INTRAVENOUS at 21:45

## 2022-01-01 RX ADMIN — CEFTRIAXONE 100 MILLIGRAM(S): 500 INJECTION, POWDER, FOR SOLUTION INTRAMUSCULAR; INTRAVENOUS at 21:53

## 2022-01-01 RX ADMIN — PIPERACILLIN AND TAZOBACTAM 200 GRAM(S): 4; .5 INJECTION, POWDER, LYOPHILIZED, FOR SOLUTION INTRAVENOUS at 20:08

## 2022-01-01 RX ADMIN — Medication 4.26 MICROGRAM(S)/KG/MIN: at 20:09

## 2022-01-01 RX ADMIN — CHLORHEXIDINE GLUCONATE 15 MILLILITER(S): 213 SOLUTION TOPICAL at 21:45

## 2022-01-01 RX ADMIN — Medication 1200 MILLIGRAM(S): at 21:45

## 2022-01-01 RX ADMIN — CARVEDILOL PHOSPHATE 6.25 MILLIGRAM(S): 80 CAPSULE, EXTENDED RELEASE ORAL at 10:56

## 2022-01-01 RX ADMIN — PIPERACILLIN AND TAZOBACTAM 25 GRAM(S): 4; .5 INJECTION, POWDER, LYOPHILIZED, FOR SOLUTION INTRAVENOUS at 14:29

## 2022-01-01 RX ADMIN — PIPERACILLIN AND TAZOBACTAM 25 GRAM(S): 4; .5 INJECTION, POWDER, LYOPHILIZED, FOR SOLUTION INTRAVENOUS at 05:54

## 2022-01-01 RX ADMIN — SIMVASTATIN 20 MILLIGRAM(S): 20 TABLET, FILM COATED ORAL at 22:27

## 2022-01-01 RX ADMIN — SIMVASTATIN 20 MILLIGRAM(S): 20 TABLET, FILM COATED ORAL at 22:26

## 2022-01-01 RX ADMIN — PROPOFOL 2.72 MICROGRAM(S)/KG/MIN: 10 INJECTION, EMULSION INTRAVENOUS at 12:26

## 2022-01-01 RX ADMIN — FAMOTIDINE 20 MILLIGRAM(S): 10 INJECTION INTRAVENOUS at 22:26

## 2022-01-01 RX ADMIN — Medication 324 MILLIGRAM(S): at 21:53

## 2022-01-01 RX ADMIN — HYDROMORPHONE HYDROCHLORIDE 5 MG/HR: 2 INJECTION INTRAMUSCULAR; INTRAVENOUS; SUBCUTANEOUS at 14:43

## 2022-01-01 RX ADMIN — Medication 60 MILLIGRAM(S): at 14:28

## 2022-05-12 NOTE — ED STATDOCS - CROS ED CONS ALL NEG
"  ED for acute condition Discharge Protocol    \"Hi, my name is Judith Capone RN, a registered nurse, and I am calling from [facility].  I am calling to follow up and see how things are going for you after your recent emergency visit.\"    Tell me how you are doing now that you are home?\"     Doing well, was just discharged yesterday. No dizziness symptoms since being home.       Discharge Instructions    \"Let's review your discharge instructions.  What is/are the follow-up recommendations?  Pt. Response: Follow up with PCP, follow up with neurology. Adjust carbamazepine to 400 mg BID.     \"Has an appointment with your primary care provider been scheduled?\"  Yes. (confirm and remind to bring meds)    Medications    \"Tell me what changed about your medicines when you discharged?\"    Adjusted carbamazepine    \"What questions do you have about your medications?\"    None   Call Summary    \"What questions or concerns do you have about your recent visit and your follow-up care?\"     none    \"If you have questions or things don't continue to improve, we encourage you contact us through the main clinic number (give number).  Even if the clinic is not open, triage nurses are available 24/7 to help you.     We would like you to know that our clinic has extended hours (provide information).  We also have urgent care (provide details on closest location and hours/contact info)\"    \"Thank you for your time and take care!\"                "
ED / Discharge Outreach Protocol    Patient Contact    Attempt # 1    Was call answered?  No.  Left message on voicemail with information to call me back.      
What type of discharge? Inpatient  Risk of Hospital admission or ED visit: 8%  Is a TCM episode required? No  When should the patient follow up with PCP? 14 days of discharge.        Judith Capone RN  [facility]      
negative...

## 2022-06-28 NOTE — DISCHARGE NOTE ADULT - NS MD DC FALL RISK RISK
[facility]...
For information on Fall & Injury Prevention, visit [email or web address]

## 2022-10-24 NOTE — ED PROVIDER NOTE - NSICDXFAMILYHX_GEN_ALL_CORE_FT
FAMILY HISTORY:  Father  Still living? Unknown  Family history of cancer in father, Age at diagnosis: Age Unknown

## 2022-10-24 NOTE — ED PROVIDER NOTE - CARDIAC, MLM
Normal rate, regular rhythm.  Heart sounds S1, S2.  No murmurs, rubs or gallops. Normal rate, regular rhythm. Heart sounds S1, S2. + murmur

## 2022-10-24 NOTE — ED PROVIDER NOTE - OBJECTIVE STATEMENT
89 year old female with PMHx of CAD, HTN, HLD, GERD, diverticulitis, chronic back pain, and lung cancer presents to the ED BIBEMS complaining of non-radiating mid-sternal chest pain since yesterday. PCP Dr. Cuevas 89 year old female with PMHx of CAD, HTN, HLD, GERD, diverticulitis, chronic back pain, and lung cancer presents to the ED BIBEMS complaining of intermittent non-radiating left lateral chest pain since yesterday. Denies SOB, nausea, vomiting, diaphoresis, or any other complaints. PCP Dr. Cuevas.

## 2022-10-24 NOTE — ED PROVIDER NOTE - NSICDXPASTMEDICALHX_GEN_ALL_CORE_FT
PAST MEDICAL HISTORY:  CAD (coronary artery disease)     Chronic back pain     Diverticulitis     GERD (gastroesophageal reflux disease)     HTN (hypertension)     Lung cancer     Overactive bladder       HLD (hyperlipidemia)

## 2022-10-24 NOTE — PHARMACOTHERAPY INTERVENTION NOTE - COMMENTS
Medication history, complete, reviewed medications with patient and confirmed with doctor first med hx.

## 2022-10-24 NOTE — ED PROVIDER NOTE - CLINICAL SUMMARY MEDICAL DECISION MAKING FREE TEXT BOX
Plan: Plan: EKG, labs, chest x-ray, and reassess. Plan: EKG, labs, chest x-ray, and reassess.-->CXR with dynamic ekg changes compared to prior.  trop neg x 1 at time of admission.  found to have mucous plugging, will treat with abx and admit to tele

## 2022-10-25 NOTE — PATIENT PROFILE ADULT - FUNCTIONAL ASSESSMENT - DAILY ACTIVITY 2.
How Severe Is Your Skin Lesion?: moderate
Has Your Skin Lesion Been Treated?: not been treated
Is This A New Presentation, Or A Follow-Up?: Skin Lesion
4 = No assist / stand by assistance

## 2022-10-25 NOTE — DIETITIAN INITIAL EVALUATION ADULT - PERTINENT MEDS FT
MEDICATIONS  (STANDING):  azithromycin  IVPB 500 milliGRAM(s) IV Intermittent every 24 hours  carvedilol 6.25 milliGRAM(s) Oral every 12 hours  cefTRIAXone   IVPB 1000 milliGRAM(s) IV Intermittent every 24 hours  coronavirus bivalent (EUA) Booster Vaccine (PFIZER) 0.3 milliLiter(s) IntraMuscular once  enoxaparin Injectable 40 milliGRAM(s) SubCutaneous every 24 hours  famotidine    Tablet 20 milliGRAM(s) Oral at bedtime  influenza  Vaccine (HIGH DOSE) 0.7 milliLiter(s) IntraMuscular once  simvastatin 20 milliGRAM(s) Oral at bedtime  tamsulosin 0.4 milliGRAM(s) Oral at bedtime    MEDICATIONS  (PRN):  acetaminophen     Tablet .. 650 milliGRAM(s) Oral every 6 hours PRN Mild Pain (1 - 3)  acetaminophen     Tablet .. 650 milliGRAM(s) Oral every 6 hours PRN Temp greater or equal to 38C (100.4F), Mild Pain (1 - 3)  aluminum hydroxide/magnesium hydroxide/simethicone Suspension 30 milliLiter(s) Oral every 4 hours PRN Dyspepsia  melatonin 3 milliGRAM(s) Oral at bedtime PRN Insomnia  ondansetron Injectable 4 milliGRAM(s) IV Push every 8 hours PRN Nausea and/or Vomiting

## 2022-10-25 NOTE — H&P ADULT - NSHPSOCIALHISTORY_GEN_ALL_CORE
Lives alone. Has 2 sons, 1 lives in [place]. Independent with ADLs and IADLs. Quit smoking many years ago Denies alcohol and drug use.

## 2022-10-25 NOTE — DIETITIAN INITIAL EVALUATION ADULT - ORAL INTAKE PTA/DIET HISTORY
Pt lives at home alone; typically makes "readily-prepared" meals (sandwiches, cereal); no problems grocery shopping - reports decreased appetite & consuming <50% of ENN x 6 mo 2/2 chest pain, SOB

## 2022-10-25 NOTE — DIETITIAN INITIAL EVALUATION ADULT - NS FNS CHANGE IN WEIGHT
additional history taking/interpretation of diagnostic studies/consultation with other physicians/direct patient care (not related to procedure)/documentation Loss

## 2022-10-25 NOTE — PATIENT PROFILE ADULT - FALL HARM RISK - HARM RISK INTERVENTIONS
Assistance with ambulation/Assistance OOB with selected safe patient handling equipment/Communicate Risk of Fall with Harm to all staff/Discuss with provider need for PT consult/Monitor gait and stability/Provide patient with walking aids - walker, cane, crutches/Reinforce activity limits and safety measures with patient and family/Review medications for side effects contributing to fall risk/Sit up slowly, dangle for a short time, stand at bedside before walking/Tailored Fall Risk Interventions/Toileting schedule using arm’s reach rule for commode and bathroom/Use of alarms - bed, chair and/or voice tab/Visual Cue: Yellow wristband and red socks/Bed in lowest position, wheels locked, appropriate side rails in place/Call bell, personal items and telephone in reach/Instruct patient to call for assistance before getting out of bed or chair/Non-slip footwear when patient is out of bed/[place] to call system/Physically safe environment - no spills, clutter or unnecessary equipment/Purposeful Proactive Rounding/Room/bathroom lighting operational, light cord in reach

## 2022-10-25 NOTE — PHYSICAL THERAPY INITIAL EVALUATION ADULT - MODALITIES TREATMENT COMMENTS
pt left seated in chair post Eval; chair alarm donned; O2 1L/min nc, HM in place; sons / RN Gail present; callbell in reach; pt instructed not to get up alone; call nursing for assist; gracy well; denied pain

## 2022-10-25 NOTE — PROVIDER CONTACT NOTE (CRITICAL VALUE NOTIFICATION) - ACTION/TREATMENT ORDERED:
Critical Care MARIAH Carty notified and to redraw ABG under ultrasound
Critical Care PA notified and no new orders at this time

## 2022-10-25 NOTE — PROGRESS NOTE ADULT - ASSESSMENT
89 y/o F presents with chest pain     1. Left sided chest pain/back , reproducible and mildly pleuritic  could be secondary to pleurisy vs  costochondritis   - less likely cardiac trops neg   - C/w tele  - EKG:  T wave inversions in V3-V4   - ECHO  pending   - C/w ASA  -D/w Dr Palla, conservative management  , will f/u echo     2. Acute vs chronic  hypoxic respiratory failure  secondary to small to moderate loculated right pleural effusion, pulmonary fibrosis, emphysema,  RICHA peripheral 1.5 cm nodule.   - SpO2 92% on room air -> 96% on 2-3L nasal cannula ( possibly is 925 is baseline with Pulm Fibrosis and emphysema on CT ?)   - c/w supplemental O2 to maintain SpO2 > 92%   - check D-Dimers and LE doppler   - Started empirically  on  Ceftriaxone and Azithromycin will further d/w Pulm   - start  inhalers, Mucinex  - D/w  CT Sx   R pleural effusion is small  if needs Bx will ask IR  - Will d/w Pulm further management Bx of L upper nodule?         3. Indeterminate RICHA peripheral 1.5 cm nodule H/o RLL Lung CA, s/p CyberKnife   - As per old records had IR Bx 2013, adenocarcinoma  - May need biopsy of nodule given size - Pulmonology consult pending, d/w Dr Marvin       4.. Hypoalbuminemia Severe  Protein calorie Malnutrition   - Albumin 3.2  - Nutrition consult appreciated,  regular diet     5. HTN  Monitor BP  C/w Carvedilol    6. Urinary retention   C/w flomax  Monitor UO     DVT ppx: Lovenox 40 mg subcutaneous daily     Code status: Full code (Pt agrees to chest compressions and intubation if required).   Emergency contact: Joey Elizabeth (son/HCP) [phone]

## 2022-10-25 NOTE — PROGRESS NOTE ADULT - SUBJECTIVE AND OBJECTIVE BOX
CC: Chest pain      HPI: 89 y/o F with PMH CAD, h/o right sided Lung ca 2013  s/p RT, HLD, and GERD, CBP,  DVT, overactive bladder presented with pain on the left side of the chest under the left breast and left back that started yesterday and progressively worsened today. No radiation of the pain to the jaw or arms. No recent injury/trauma. Took tylenol without relief. No alleviating or aggravating factors. Denies fevers, chills, chest pain, SOB, abdominal pain, N/V, diarrhea/constipation. Pt reports that her medications ran out a few weeks ago and she has not been taking anything at home.     ER course: SpO2 92% in the ER. Labs: glucose 108, albumin 3.2. COVID and RVP negative. EKG: NSR with HR 95 bpm,  ms, complete RBBB, T wave inversions in lead III, AVF, V3-V4. The T wave inversions in V3-V4 are new compared to prior EKG from 1/2020 (personally reviewed).     Imaging:   - CXR: right middle lobe consolidation, right hemidiaphragm elevation, cardiomegaly (personally reviewed).  - CT chest: indeterminate small to moderate loculated right pleural effusion with right pleural thickening. Opacification of several right lower lobe bronchi. Partial atelectasis right lower lobe. Pulmonary fibrosis, emphysema, and indeterminate left upper lobe peripheral 1.5 cm nodule.     Pt was given 324 mg of aspirin, ceftriaxone, azithromycin, Tylenol.  She is being admitted to telemetry for further management.       INTERVAL HPI/ OVERNIGHT EVENTS: chart reviewed, Pt was seen and examined, confirmed history above. Pt reports no cough or SOB,  no fevers or chills. Pt is poor historian  and forgetful . She denies any falls or trauma.  Pain is worse with deep breathing. Son at bedside, reports that Pt has poor oral intake and lost significant amount of weight in past 1-2 years  POC discussed     Vital Signs Last 24 Hrs  T(C): 36.7 (25 Oct 2022 09:06), Max: 36.9 (25 Oct 2022 00:00)  T(F): 98 (25 Oct 2022 09:06), Max: 98.4 (25 Oct 2022 00:00)  HR: 88 (25 Oct 2022 09:06) (68 - 88)  BP: 105/67 (25 Oct 2022 09:06) (97/63 - 131/93)  BP(mean): 74 (25 Oct 2022 06:29) (74 - 92)  RR: 17 (25 Oct 2022 09:06) (14 - 17)  SpO2: 98% (25 Oct 2022 09:06) (95% - 98%)    Parameters below as of 25 Oct 2022 09:06  Patient On (Oxygen Delivery Method): nasal cannula  O2 Flow (L/min): 2        REVIEW OF SYSTEMS:  All other review of systems is negative unless indicated above.      PHYSICAL EXAM:  General: Well developed; malnourished and frail elderly female;  in no acute distress  Eyes:  EOMI; conjunctiva and sclera clear  Head: Normocephalic; atraumatic  ENMT: No nasal discharge; airway clear  Neck: Supple; non tender; no masses  Respiratory: Decreased BS at bases b/l  No wheezes.  reproducible L chest wall and back pain   Cardiovascular: Regular rate and rhythm. S1 and S2 Normal; +  murmur  Gastrointestinal: Soft non-tender non-distended; Normal bowel sounds  Genitourinary: No  suprapubic  tenderness  Extremities: no pitting  edema, R leg hematoma/ecchymosis   Vascular: Peripheral pulses palpable 2+ bilaterally  Neurological: Alert and oriented x3, forgetful, non focal   Musculoskeletal: Normal muscle tone, without deformities        LABS:                         12.3   5.60  )-----------( 203      ( 25 Oct 2022 09:13 )             39.5     25 Oct 2022 09:13    140    |  108    |  25     ----------------------------<  90     4.1     |  27     |  0.70     Ca    8.9        25 Oct 2022 09:13  Mg     2.1       24 Oct 2022 19:34    TPro  7.8    /  Alb  3.2    /  TBili  0.7    /  DBili  x      /  AST  31     /  ALT  27     /  AlkPhos  80     24 Oct 2022 19:34      CAPILLARY BLOOD GLUCOSE        LIVER FUNCTIONS - ( 24 Oct 2022 19:34 )  Alb: 3.2 g/dL / Pro: 7.8 gm/dL / ALK PHOS: 80 U/L / ALT: 27 U/L / AST: 31 U/L / GGT: x               MEDICATIONS  (STANDING):  azithromycin  IVPB 500 milliGRAM(s) IV Intermittent every 24 hours  carvedilol 6.25 milliGRAM(s) Oral every 12 hours  cefTRIAXone   IVPB 1000 milliGRAM(s) IV Intermittent every 24 hours  coronavirus bivalent (EUA) Booster Vaccine (PFIZER) 0.3 milliLiter(s) IntraMuscular once  enoxaparin Injectable 40 milliGRAM(s) SubCutaneous every 24 hours  famotidine    Tablet 20 milliGRAM(s) Oral at bedtime  influenza  Vaccine (HIGH DOSE) 0.7 milliLiter(s) IntraMuscular once  simvastatin 20 milliGRAM(s) Oral at bedtime  tamsulosin 0.4 milliGRAM(s) Oral at bedtime    MEDICATIONS  (PRN):  acetaminophen     Tablet .. 650 milliGRAM(s) Oral every 6 hours PRN Mild Pain (1 - 3)  acetaminophen     Tablet .. 650 milliGRAM(s) Oral every 6 hours PRN Temp greater or equal to 38C (100.4F), Mild Pain (1 - 3)  aluminum hydroxide/magnesium hydroxide/simethicone Suspension 30 milliLiter(s) Oral every 4 hours PRN Dyspepsia  melatonin 3 milliGRAM(s) Oral at bedtime PRN Insomnia  ondansetron Injectable 4 milliGRAM(s) IV Push every 8 hours PRN Nausea and/or Vomiting      RADIOLOGY & ADDITIONAL TESTS:      ACC: [id number] EXAM:  CT CHEST                        PROCEDURE DATE:  10/24/2022      COMPARISON: None.    FINDINGS:    AIRWAYS AND LUNGS: Opacification of several right lower lobe bronchi.   Partial atelectasis right lower lobe. Peripheral reticulation and   traction bronchiectasis. Emphysema. Left upper lobe peripheral 1.5 cm   nodule.    MEDIASTINUM AND PLEURA: There are no enlarged mediastinal, hilar or   axillary lymph nodes. The visualized portion of the thyroid gland is   unremarkable. Trace left pleural effusion. Age-indeterminate small to   moderate loculated right pleural effusion with right pleural thickening.   There is no pneumothorax.    HEART AND VESSELS: There is cardiomegaly.  There are atherosclerotic   calcifications of the aorta and coronary arteries.  There is trace   pericardial fluid.  Aortic valve calcification    UPPER ABDOMEN: Images of the upper abdomen demonstrate no abnormality.    BONES AND SOFT TISSUES: The bones are unremarkable.  The soft tissues are   unremarkable.    IMPRESSION:  -indeterminate small to moderate loculated right pleural effusion with   right pleural thickening.    Opacification of several right lower lobe bronchi. Partial atelectasis   right lower lobe.    Pulmonary fibrosis, emphysema, and indeterminate left upper lobe   peripheral 1.5 cm nodule.

## 2022-10-25 NOTE — PROVIDER CONTACT NOTE (OTHER) - RECOMMENDATIONS
Cardio consult already called in for abnormal EKG, pt remains sleeping comfortable in bed, VS as charted

## 2022-10-25 NOTE — PROVIDER CONTACT NOTE (CRITICAL VALUE NOTIFICATION) - SITUATION
Critical blood gas: pH 7.18, pCO2 55, pO2 45, HCO3 20, base excess -8.3, and O2 saturation 58
Elevated lactate 8.6

## 2022-10-25 NOTE — H&P ADULT - NSHPPHYSICALEXAM_GEN_ALL_CORE
ICU Vital Signs Last 24 Hrs  T(C): 36.4 (25 Oct 2022 01:05), Max: 36.9 (25 Oct 2022 00:00)  T(F): 97.6 (25 Oct 2022 01:05), Max: 98.4 (25 Oct 2022 00:00)  HR: 85 (25 Oct 2022 01:05) (68 - 85)  BP: 101/85 (25 Oct 2022 01:05) (101/85 - 131/93)  BP(mean): 92 (25 Oct 2022 01:05) (81 - 92)  RR: 16 (25 Oct 2022 01:05) (14 - 17)  SpO2: 98% (25 Oct 2022 01:05) (95% - 98%)    O2 Parameters below as of 25 Oct 2022 01:05  Patient On (Oxygen Delivery Method): nasal cannula  O2 Flow (L/min): 2    General: Awake and alert, cooperative with exam. No acute distress.   Skin: Warm, dry, and pink.   Eyes: Pupils equal and reactive to light. Extraocular eye movements intact. No conjunctival injection, discharge, or scleral icterus.   HEENT: Atraumatic, normocephalic. Moist mucus membranes.   Cardiology: Normal S1, S2. No murmurs, rubs, or gallops. Regular rate and rhythm.   Respiratory: Decreased breath sounds right lung base. Crackles b/l. Normal chest expansion.   Gastrointestinal: Positive bowel sounds. Soft, non-tender, non-distended. No guarding, rigidity, or rebound tenderness. No hepatosplenomegaly.   Musculoskeletal: 5/5 motor strength in all extremities. Normal range of motion.   Extremities: No peripheral edema bilaterally. Dorsalis pedis pulses 2+ bilaterally.   Neurological: A+Ox3 (person, place, and time). Cranial nerves 2-12 intact. Normal speech. No facial droop. No focal neurological deficits.   Psychiatric: Normal affect. Normal mood.

## 2022-10-25 NOTE — H&P ADULT - NSICDXPASTMEDICALHX_GEN_ALL_CORE_FT
PAST MEDICAL HISTORY:  CAD (coronary artery disease)     Chronic back pain     Diverticulitis     GERD (gastroesophageal reflux disease)     HLD (hyperlipidemia)     HTN (hypertension)     Lung cancer     Overactive bladder

## 2022-10-25 NOTE — H&P ADULT - ASSESSMENT
89 y/o F presents with chest pain     1. Left sided chest pain could be secondary to pleurisy vs. unstable angina vs. costochondritis vs. GERD   - Admit to telemetry   - EKG: new T wave inversions in V3-V4   - 1st troponin negative, will trend 2 more troponins   - s/p 325 mg of aspirin in ER, c/w 81 mg of aspirin daily   - If troponins continue to trend upwards -> will order heparin, place pt NPO  - Cardiology consult - Dr. Vega     2. Acute hypoxic respiratory distress secondary to small to moderate loculated right pleural effusion, pulmonary fibrosis, emphysema, indeterminate RICHA peripheral 1.5 cm nodule   - Admit to telemetry   - SpO2 92% on room air -> 96% on 2-3L nasal cannula   - c/w supplemental O2 to maintain SpO2 > 92%   - s/p Ceftriaxone and Azithromycin in the ER, will continue   - Ordered LDH serum, BNP   - May need thoracentesis if worsening   - Cardiothoracic surgery consult - Dr. Walter     3. Indeterminate RICHA peripheral 1.5 cm nodule   - May need biopsy of nodule given size   - Pulmonology consult - Dr. Sebastian     4. Hyperglycemia   - Glucose 108, monitor     5. Hypoalbuminemia   - Albumin 3.2  - Nutrition consult     6. History of CAD, h/o right sided Lung ca s/p RT, HLD, and GERD, CBP, overactive bladder  - c/w home medications; verified with pt at the bedside     DVT ppx: Lovenox 40 mg subcutaneous daily   Code status: Full code (Pt agrees to chest compressions and intubation if required). We did discuss DNR/DNI and she would like to speak to her sons about this prior to completing a MOLST form. Please readdress with pt.   Emergency contact: Joey Elizabeth (son/HCP) [phone]

## 2022-10-25 NOTE — DIETITIAN INITIAL EVALUATION ADULT - ADD RECOMMEND
1) Liberalize diet to regular to maximize caloric and nutrient intake.  2) Add ensure + HP shake 1x/day  3) Encourage protein-rich foods, maximize food preferences  4) Monitor bowel movements, if no BM for >3 days, consider implementing bowel regimen.  5) Consider adding thiamine 200 mg daily 2/2 poor PO intake/ malnutrition  6) MVI w/ minerals daily to ensure 100% RDA met  7) Meal encouragement; tray set-up to increase po intake  8) Confirm goals of care regarding nutrition support  RD will continue to monitor PO intake, labs, hydration, and wt prn.

## 2022-10-25 NOTE — CONSULT NOTE ADULT - PROBLEM SELECTOR RECOMMENDATION 3
with bilateral Lung fibrosis , loculated effusion , previous hx of radiation lung carcinoma , pulmonary     patient had elevated BNP level ,  chronic ? diastolic heart failure , will check echocardiogram

## 2022-10-25 NOTE — CHART NOTE - NSCHARTNOTEFT_GEN_A_CORE
89 yo F pmhx RLL lung CA s/p cyberknife, HLD, GERD, CBP,, DVT admitted with chest pain.   RRT called which was changed to code blue after patient noted to be sitting in chair and became progressively SOB after eating  At bedside patient in PEA cardiac arrest  Chest compressions started and ACLS protocol initiated  ROSC achieved and patient started on Dopamine gtt. See code sheet for details. Transferred to CCU      ICU Vital Signs Last 24 Hrs  T(C): 36.9 (25 Oct 2022 16:18), Max: 36.9 (25 Oct 2022 00:00)  T(F): 98.4 (25 Oct 2022 16:18), Max: 98.4 (25 Oct 2022 00:00)  HR: 93 (25 Oct 2022 16:18) (81 - 93)  BP: 99/56 (25 Oct 2022 16:18) (97/63 - 106/68)  BP(mean): 74 (25 Oct 2022 06:29) (74 - 92)  ABP: --  ABP(mean): --  RR: 17 (25 Oct 2022 16:18) (14 - 17)  SpO2: 98% (25 Oct 2022 09:06) (96% - 98%)    O2 Parameters below as of 25 Oct 2022 09:06  Patient On (Oxygen Delivery Method): nasal cannula  O2 Flow (L/min): 2      Problems  -Cardiac arrest  -Acute hypoxic respiratory failure  -Pneumonia    Assessment/Plan:  -Sedation as needed. Will hold at this time to evaluate mental status post-cardiac arrest   -PEA cardiac arrest likely in setting of hypoxia. Dopamine gtt started during code. HR improved with resuscitation wean as tolerated. Will order additional Levophed gtt to support BP  -Check echocardiogram  -NGT decompression  -Start LR @100cc/h  -Empiric abx coverage w/ Zosyn for aspiration coverage  _LE dopplers negative for DVT  -DVT ppx Lovenox    Intensivist Dr. Lyon at bedside. Daughter in law updated    Critical care time: 40 minutes including time spent reviewing chart, ordering tsts/labs, discussing with interdisciplinary team.

## 2022-10-25 NOTE — DIETITIAN INITIAL EVALUATION ADULT - PERTINENT LABORATORY DATA
10-25    140  |  108  |  25<H>  ----------------------------<  90  4.1   |  27  |  0.70    Ca    8.9      25 Oct 2022 09:13  Mg     2.1     10-24    TPro  7.8  /  Alb  3.2<L>  /  TBili  0.7  /  DBili  x   /  AST  31  /  ALT  27  /  AlkPhos  80  10-24

## 2022-10-25 NOTE — PATIENT PROFILE ADULT - SW.
Assessment/Plan: Patient was given a script for fasting labs  Will give referral to Rheumatology at this time for her RA  She has been cleared by Endocrine and will obtain US of the thyroid she did have done with [facility]  She is up to date on her GYN exams and will give script for a mammogram   She will get the Flu vaccine today  She will make an appointment with Psychiatry for her concerns of ADHD  Will renew her Celexa  Will notify her once lab work is back  Will bring back in 6 months to one year or sooner if need be  No problem-specific Assessment & Plan notes found for this encounter           Problem List Items Addressed This Visit     Anxiety    Relevant Medications    citalopram (CeleXA) 40 mg tablet    Other Relevant Orders    Comprehensive metabolic panel    CBC and differential    TSH, 3rd generation with Free T4 reflex    RA (rheumatoid arthritis) (HCC) - Primary    Relevant Orders    Ambulatory referral to Rheumatology    Comprehensive metabolic panel    CBC and differential    TSH, 3rd generation with Free T4 reflex    Secondary adrenal insufficiency (HCC)    Relevant Orders    Comprehensive metabolic panel    CBC and differential    TSH, 3rd generation with Free T4 reflex    Sjogrens syndrome (HCC)    Relevant Orders    Comprehensive metabolic panel    CBC and differential    TSH, 3rd generation with Free T4 reflex    Screening for breast cancer    Relevant Orders    Mammo screening bilateral w 3d & cad    Screening for cardiovascular condition    Relevant Orders    Lipid panel      Other Visit Diagnoses     Immunosuppressed status ([facility] [address] )        Relevant Orders    influenza vaccine, [id number], quadrivalent, recombinant, PF, 0 5 mL, for patients 18-49 yr with comorbidities ([facility])    Need for influenza vaccination        Relevant Orders    influenza vaccine, [id number], quadrivalent, recombinant, PF, 0 5 mL, for patients 18-49 yr with comorbidities ([facility])            Subjective: Patient ID: Peter Alonso is a 39 y o  female  Orene Ramp is here today to establish care as a new patient  She was seeing a PCP with [facility] and does work in the [employer] as an RN  She does have a PMH of RA and was seeing Rheumatology but has since stopped due to having to change everyday over to [place]  She is using Prednisone on her own adjusting doses and states her flare ups are in the Winter  She would was on Plaquenil and Methotrexate and states she was not liking the side effects to this medications and has since stopped them  She also back in December 2017 did have adrenal crisis and was sent to Endocrine and states she did have a work up done and was cleared by them  She does have a thyroid nodule with biopsy done in February of 2017 and states they did tell her she does not have to follow back up with them  She does have anxiety and thinks she may have adult ADHD and is making an appointment with Psychiatry  She is taking Ativan currently everyday and is taking Celexa  She denies any suicidal ideations  She did have a GYN exam done in September and does have the [address] IUD  She has not had a mammogram   She denies any chest pain, SOB, or palpitations  She is up to date on her eye and dental exams  She was sick around two weeks ago with a horrible cough and is having some rib pain but is taking Motrin OTC and states her symptoms are well maintained on this  She offers no other issues  The following portions of the patient's history were reviewed and updated as appropriate:   She  has a past medical history of Anxiety; Depression; Rheumatoid arthritis ([address] ); and Sjoegren syndrome ([address] )    She   Patient Active Problem List    Diagnosis Date Noted    Screening for breast cancer 10/17/2018    Screening for cardiovascular condition 10/17/2018    Allergic rhinitis 06/29/2018    Anxiety 06/29/2018    Inflammatory polyarthropathy ([address] ) 06/29/2018    RA (rheumatoid arthritis) ([facility] ) 06/29/2018    Varicose veins 06/29/2018    Depressed mood 02/14/2017    Secondary adrenal insufficiency (HCC) 02/14/2017    Thyroid nodule, uninodular 02/14/2017    Sjogren's syndrome ([facility] ) 12/31/2016    Acute adrenal insufficiency ([facility] ) 12/30/2016    Anemia 12/19/2016    Leucocytosis 12/17/2016    Sjogrens syndrome ([facility] ) 12/17/2016    Varicose vein 03/10/2015     She  has a past surgical history that includes Cholecystectomy and Venous ablation  Her family history includes Esophageal cancer in her father; No Known Problems in her mother  She  reports that she has quit smoking  She has never used smokeless tobacco  She reports that she does not drink alcohol or use drugs  Current Outpatient Prescriptions   Medication Sig Dispense Refill    citalopram (CeleXA) 40 mg tablet Take 1 tablet (40 mg total) by mouth daily 30 tablet 3    LORazepam (ATIVAN) 0 5 mg tablet Take 0 5 mg by mouth daily        No current facility-administered medications for this visit  Current Outpatient Prescriptions on File Prior to Visit   Medication Sig    [DISCONTINUED] citalopram (CeleXA) 40 mg tablet TAKE ONE TABLET BY MOUTH EVERY DAY     No current facility-administered medications on file prior to visit  She is allergic to shellfish-derived products       Review of Systems   Constitutional: Negative  HENT: Negative  Eyes: Negative  Respiratory: Negative  Cardiovascular: Negative  Gastrointestinal: Negative  Endocrine: Negative  Genitourinary: Negative  Musculoskeletal: Positive for arthralgias and myalgias  Skin: Negative  Allergic/Immunologic: Negative  Neurological: Negative  Hematological: Negative  Psychiatric/Behavioral: Negative            Objective:      /60 (BP Location: Right arm, Patient Position: Sitting, Cuff Size: Adult)   Pulse 78   Temp 98 9 °F (37 2 °C) (Temporal)   Ht 5' 4" (1 626 m)   Wt 86 1 kg (189 lb 12 8 oz)   SpO2 98%   BMI 32 58 kg/m² Physical Exam   Constitutional: She is oriented to person, place, and time  She appears well-developed and well-nourished  HENT:   Head: Normocephalic and atraumatic  Right Ear: External ear normal    Left Ear: External ear normal    Nose: Nose normal    Mouth/Throat: Oropharynx is clear and moist    Eyes: Pupils are equal, round, and reactive to light  Conjunctivae and EOM are normal    Neck: Normal range of motion  Cardiovascular: Normal rate, regular rhythm, normal heart sounds and intact distal pulses  Pulmonary/Chest: Effort normal    Abdominal: Soft  Bowel sounds are normal    Musculoskeletal: Normal range of motion  Neurological: She is alert and oriented to person, place, and time  She has normal reflexes  Skin: Skin is warm and dry  Psychiatric: She has a normal mood and affect  Her behavior is normal  Judgment and thought content normal    Vitals reviewed  social work

## 2022-10-25 NOTE — DIETITIAN INITIAL EVALUATION ADULT - NSFNSGIIOFT_GEN_A_CORE
ECC received a call from:    Name of Caller: Oscar Salcido    Relationship to patient: self    Organization name: n/a    Best contact number: [phone]    Reason for call:   Pt would like to receive a call back from clinical staff to be scheduled for a 3 month f/u. Pt prefers an ov. Please advise. I&O's Detail  *None doc'd

## 2022-10-25 NOTE — INPATIENT CERTIFICATION FOR MEDICARE PATIENTS - NS ICMP TWO DAYS INPATIENT
18 y/o female, no significant pmhx c/o decreased appetite, unintentional weight loss (~10-20lbs) and lightheadedness x2 mos. Pt was seen by PMD, awaiting results to r/o anemia, no hx of anemia or requiring iron pills in the past. No family hx CA or cardiac conditions. Pt currently menstruating, states she doesn't remember eating anything for the past few days, denies increased anxiety or restriction, but felt lightheaded this morning--denies LOC, lighthheaded, dizzy, headache, N/V/D/C, fever/chills, abdominal pain, pregnancy, anorexia/bulimia or any other concerns in the ED. Pt attests to just completing her first semester of college. Attests to x1 episode syncope 2 months ago while having a BM and stomach upset last month. No hx syncopal episodes. Yes

## 2022-10-25 NOTE — CONSULT NOTE ADULT - ASSESSMENT
89y Female CAD, h/o right RLL Lung ca 2013 s/p cyberknife, HLD, GERD, CBP,  DVT, overactive bladder admitted 10/24/22 with pain on the left side of the chest associated w T wave inversions. Called for findings of a small to moderate loculated right pleural effusion with right pleural thickening. Also noted to have a left upper lobe peripheral 1.5 cm nodule.    no intervention recommended for small loculated effusion. If deemed necessary by medical services recommend consulting IR.  GOC conversation.  Rad onc consultation 
89 y/o F with PMH CAD, h/o right sided Lung ca s/p RT, HLD, and GERD, CBP, overactive bladder presented with pain on the left side of the chest under the left breast and left back that started yesterday and progressively worsened today. No radiation of the pain to the jaw or arms. No recent injury/trauma. Took tylenol without relief. No alleviating or aggravating factors. Denies fevers, chills, chest pain, SOB, abdominal pain, N/V, diarrhea/constipation. Pt reports that her medications ran out a few weeks ago and she has not been taking anything at home.   ER course: SpO2 92% in the ER. Labs: glucose 108, albumin 3.2. COVID and RVP negative. EKG: NSR with HR 95 bpm,  ms, complete RBBB, T wave inversions in lead III, AVF, V3-V4. The T wave inversions in V3-V4 are new compared to prior EKG from 1/2020 (personally reviewed).   CXR: right middle lobe consolidation, right hemidiaphragm elevation, cardiomegaly (personally reviewed).  CT chest: indeterminate small to moderate loculated right pleural effusion with right pleural thickening. Opacification of several right lower lobe bronchi. Partial atelectasis right lower lobe. Pulmonary fibrosis, emphysema, and indeterminate left upper lobe peripheral 1.5 cm nodule.   Reviewed previous imaging   Discussed with Dr Prajapati  she is no active pulm distress  Reviewed imaging and can consider biopsy of the RICHA nodule since previous imaging did not suggest this (reviewed PET from 2012 at [facility] which only showed right sided lung cancer change)  It is highly suspicious that this is either metastatic or infectious  Start bronchodilators (Spiriva/Symbicort)  Supportive care for underlying pulmonary fibrosis  Assess for Home O2  Will follow up 10/26

## 2022-10-25 NOTE — H&P ADULT - NSHPREVIEWOFSYSTEMS_GEN_ALL_CORE
Constitutional: negative for fatigue, negative for fever, negative for chills, negative for decreased appetite.  Skin: negative for rashes, negative for open wounds, negative for jaundice.   Eyes: negative for blurry vision, negative for double vision.   Ears, nose, throat: negative for ear pain, negative for nasal congestion, negative for sore throat, negative for lymph node swelling.   Cardiovascular: positive for chest pain, negative for palpitations, negative for lower extremity swelling.   Respiratory: negative for shortness of breath, negative for wheezing, negative for cough.   Gastrointestinal: negative for abdominal pain, negative for nausea, negative for vomiting, negative for diarrhea, negative for constipation, negative for blood in the stool, negative for black tarry stools.   Genitourinary: negative for burning on urination, negative for urinary urgency or frequency, negative for blood in the urine.   Endocrine: negative for cold intolerance, negative for heat intolerance, negative for increased thirst.   Hematologic: negative for easy bruising or bleeding.   Musculoskeletal: negative for muscle/joint pain, negative for decreased range of motion.   Neurological: negative for dizziness, negative for headaches, negative for loss of consciousness, negative for motor weakness, negative for sensory deficits.   Psychiatric: negative for depression, negative for anxiety.

## 2022-10-25 NOTE — CONSULT NOTE ADULT - PROBLEM SELECTOR RECOMMENDATION 9
Patient with advanced age , chest pain , unclear history , appears to be atypical , with abnormal EKG  changes noted compared incomplete RBBB with upright T in previous ekg , to current ekg showing complete RBBB with secondary T changes with normal TROPONIN    will check echocardiogram to assess ventricular function and wall motion abnormalities.

## 2022-10-25 NOTE — H&P ADULT - HISTORY OF PRESENT ILLNESS
89 y/o F with PMH CAD, h/o right sided Lung ca s/p RT, HLD, and GERD, CBP, overactive bladder presented with pain on the left side of the chest under the left breast and left back that started yesterday and progressively worsened today. No radiation of the pain to the jaw or arms. No recent injury/trauma. Took tylenol without relief. No alleviating or aggravating factors. Denies fevers, chills, chest pain, SOB, abdominal pain, N/V, diarrhea/constipation. Pt reports that her medications ran out a few weeks ago and she has not been taking anything at home.     ER course: SpO2 92% in the ER. Labs: glucose 108, albumin 3.2. COVID and RVP negative. EKG: NSR with HR 95 bpm,  ms, complete RBBB, T wave inversions in lead III, AVF, V3-V4. The T wave inversions in V3-V4 are new compared to prior EKG from 1/2020 (personally reviewed).     Imaging:   - CXR: right middle lobe consolidation, right hemidiaphragm elevation, cardiomegaly (personally reviewed).  - CT chest: indeterminate small to moderate loculated right pleural effusion with right pleural thickening. Opacification of several right lower lobe bronchi. Partial atelectasis right lower lobe. Pulmonary fibrosis, emphysema, and indeterminate left upper lobe peripheral 1.5 cm nodule.     Pt was given 324 mg of aspirin, ceftriaxone, azithromycin, tylenol. She is being admitted to telemetry for further management.    PT SEEN AT THE BEDSIDE 10/24/2022 at 11:15 pm.     89 y/o F with PMH CAD, h/o right sided Lung ca s/p RT, HLD, and GERD, CBP, overactive bladder presented with pain on the left side of the chest under the left breast and left back that started yesterday and progressively worsened today. No radiation of the pain to the jaw or arms. No recent injury/trauma. Took tylenol without relief. No alleviating or aggravating factors. Denies fevers, chills, chest pain, SOB, abdominal pain, N/V, diarrhea/constipation. Pt reports that her medications ran out a few weeks ago and she has not been taking anything at home.     ER course: SpO2 92% in the ER. Labs: glucose 108, albumin 3.2. COVID and RVP negative. EKG: NSR with HR 95 bpm,  ms, complete RBBB, T wave inversions in lead III, AVF, V3-V4. The T wave inversions in V3-V4 are new compared to prior EKG from 1/2020 (personally reviewed).     Imaging:   - CXR: right middle lobe consolidation, right hemidiaphragm elevation, cardiomegaly (personally reviewed).  - CT chest: indeterminate small to moderate loculated right pleural effusion with right pleural thickening. Opacification of several right lower lobe bronchi. Partial atelectasis right lower lobe. Pulmonary fibrosis, emphysema, and indeterminate left upper lobe peripheral 1.5 cm nodule.     Pt was given 324 mg of aspirin, ceftriaxone, azithromycin, tylenol. She is being admitted to telemetry for further management.

## 2022-10-25 NOTE — DIETITIAN INITIAL EVALUATION ADULT - OTHER INFO
89 y/o F with PMH CAD, h/o right sided Lung ca s/p RT, HLD, and GERD, CBP, overactive bladder presented with pain on the left side of the chest under the left breast and left back that started yesterday and progressively worsened today. No radiation of the pain to the jaw or arms. No recent injury/trauma. Took tylenol without relief. No alleviating or aggravating factors. Denies fevers, chills, chest pain, SOB, abdominal pain, N/V, diarrhea/constipation. Pt reports that her medications ran out a few weeks ago and she has not been taking anything at home.   Admit for acute hypoxic respiratory distress.  Left sided chest pain could be secondary to pleurisy vs. unstable angina vs. costochondritis vs. GERD   Is full code - pt agrees to chest compressions and intubation if required; pt wants to discuss DNR/DNI w/ sons prior to completing a MOLST form. Was on 2-3L NC; currently on room air. Reports continued decreased appetite & consuming <50% of ENN since admit (x 1 day). Reports UBW of 150# x 4 yr ago; bed scale wt of 94# taken by RD on 10/25/22. Unintentional wt loss of 56# / 37.3% wt loss x 4 yr; not clinically significant. NFPE reveals severe muscle/ fat wasting - pt appears, thin, frail, cachetic, and glenn. Liberalize diet to regular to maximize caloric and nutrient intake. Will trial ensure + HP 1x/day - pt is receptive; denies other nutr supp. See below for other recommendations.

## 2022-10-25 NOTE — CONSULT NOTE ADULT - SUBJECTIVE AND OBJECTIVE BOX
History of Present Illness:  89y Female CAD, h/o right RLL Lung ca 2013 s/p cyberknife, HLD, GERD, CBP,  DVT, overactive bladder admitted 10/24/22 with pain on the left side of the chest associated w T wave inversions. Called for findings of a small to moderate loculated right pleural effusion with right pleural thickening. Also noted to have a left upper lobe peripheral 1.5 cm nodule.  Pt denies SOB. Has never had fluid in chest.     PMH/PSH:  Diverticulitis    HTN (hypertension)    Chronic back pain    Lung cancer    Overactive bladder    GERD (gastroesophageal reflux disease)    CAD (coronary artery disease)    HLD (hyperlipidemia)      History of tonsillectomy    S/P appendectomy        Relevant Family History  FAMILY HISTORY:  Family history of cancer in father (Father)        SOCIAL HISTORY:  Smoker: [ ] Yes  [x ] No       quit 50 years ago  ETOH use: [ ] Yes  [x ] No              FREQUENCY / QUANTITY:  Ilicit Drug use:  [ ] Yes  [ x] No  Live with: alone w aide that comes weekly and sons check in daily    MEDICATIONS  (STANDING):  azithromycin  IVPB 500 milliGRAM(s) IV Intermittent every 24 hours  budesonide 160 MICROgram(s)/formoterol 4.5 MICROgram(s) Inhaler 2 Puff(s) Inhalation two times a day  carvedilol 6.25 milliGRAM(s) Oral every 12 hours  cefTRIAXone   IVPB 1000 milliGRAM(s) IV Intermittent every 24 hours  coronavirus bivalent (EUA) Booster Vaccine (PFIZER) 0.3 milliLiter(s) IntraMuscular once  enoxaparin Injectable 40 milliGRAM(s) SubCutaneous every 24 hours  famotidine    Tablet 20 milliGRAM(s) Oral at bedtime  guaiFENesin ER 1200 milliGRAM(s) Oral every 12 hours  influenza  Vaccine (HIGH DOSE) 0.7 milliLiter(s) IntraMuscular once  simvastatin 20 milliGRAM(s) Oral at bedtime  tamsulosin 0.4 milliGRAM(s) Oral at bedtime  tiotropium 18 MICROgram(s) Capsule 1 Capsule(s) Inhalation daily    MEDICATIONS  (PRN):  acetaminophen     Tablet .. 650 milliGRAM(s) Oral every 6 hours PRN Mild Pain (1 - 3)  acetaminophen     Tablet .. 650 milliGRAM(s) Oral every 6 hours PRN Temp greater or equal to 38C (100.4F), Mild Pain (1 - 3)  ALBUTerol    90 MICROgram(s) HFA Inhaler 2 Puff(s) Inhalation every 6 hours PRN Shortness of Breath and/or Wheezing  aluminum hydroxide/magnesium hydroxide/simethicone Suspension 30 milliLiter(s) Oral every 4 hours PRN Dyspepsia  melatonin 3 milliGRAM(s) Oral at bedtime PRN Insomnia  ondansetron Injectable 4 milliGRAM(s) IV Push every 8 hours PRN Nausea and/or Vomiting      Allergies: Lotrel (Unknown)  sulfa drugs (Unknown)                                                            LABS:                        12.3   5.60  )-----------( 203      ( 25 Oct 2022 09:13 )             39.5     10-25    140  |  108  |  25<H>  ----------------------------<  90  4.1   |  27  |  0.70    Ca    8.9      25 Oct 2022 09:13  Mg     2.1     10-24    TPro  7.8  /  Alb  3.2<L>  /  TBili  0.7  /  DBili  x   /  AST  31  /  ALT  27  /  AlkPhos  80  10-24      < from: CT Chest No Cont (10.24.22 @ 20:39) >  FINDINGS:    AIRWAYS AND LUNGS: Opacification of several right lower lobe bronchi.   Partial atelectasis right lower lobe. Peripheral reticulation and   traction bronchiectasis. Emphysema. Left upper lobe peripheral 1.5 cm   nodule.    MEDIASTINUM AND PLEURA: There are no enlarged mediastinal, hilar or   axillary lymph nodes. The visualized portion of the thyroid gland is   unremarkable. Trace left pleural effusion. Age-indeterminate small to   moderate loculated right pleural effusion with right pleural thickening.   There is no pneumothorax.    HEART AND VESSELS: There is cardiomegaly.  There are atherosclerotic   calcifications of the aorta and coronary arteries.  There is trace   pericardial fluid.  Aortic valve calcification    UPPER ABDOMEN: Images of the upper abdomen demonstrate no abnormality.    BONES AND SOFT TISSUES: The bones are unremarkable.  The soft tissues are   unremarkable.    IMPRESSION:  -indeterminate small to moderate loculated right pleural effusion with   right pleural thickening.    Opacification of several right lower lobe bronchi. Partial atelectasis   right lower lobe.    Pulmonary fibrosis, emphysema, and indeterminate left upper lobe   peripheral 1.5 cm nodule.      < end of copied text >              Review of Systems             Constitutional: denies fever, chills, general malaise, weight loss, weight gain, diaphoresis   HEENT: dry mouth,   Respiratory: denies SOB, JOHNSON, cough, phlegm, wheezing, hemoptysis  Cardiovascular: left sided chest pain   Gastrointestinal: denies nausea, vomiting, diarrhea, , abdominal pain, melena   Genitourinary: denies dysuria, frequency, urgency, incontinence, hematuria   Skin/Breast: denies rash, hives, itching, masses, hair loss   Musculoskeletal:  arthritis, j  Neurologic: denies syncope, LOC, headache, weakness, dizziness, parasthesias, numbness, seizures, confusion, dementia   Psychiatric: denies feeling anxious, depressed, suicidal, homicidal  Endocrine: denies increased fingerstick glucoses, cold or heat intolerance, polydipsia, polyuria, polyphagia   Hematology/Oncology: h/o lung CA   ROS negative x 10 systems except as noted above    T(C): 36.9 (10-25-22 @ 16:18), Max: 36.9 (10-25-22 @ 00:00)  HR: 93 (10-25-22 @ 16:18) (81 - 93)  BP: 99/56 (10-25-22 @ 16:18) (97/63 - 106/68)  RR: 17 (10-25-22 @ 16:18) (14 - 17)  SpO2: 98% (10-25-22 @ 09:06) (96% - 98%)      Physical Exam  General:  NAD, thin                                                         Neuro: A+O x 3,                     Eyes: PERRL, EOMI   ENT: Normal exam of nasal/oral mucosa with absence of cyanosis.   Neck: supple, no JVD, trachea midline   Chest: , normal excursion, no accessory muscle use note  CV: RRR,   GI: soft, NT, ND,  Extremities: edema  SKIN: warm, dry, intact   
Patient is a 89y old  Female who presents with a chief complaint of Chest pain (25 Oct 2022 13:47)      HPI:  PT SEEN AT THE BEDSIDE 10/24/2022 at 11:15 pm.     89 y/o F with PMH CAD, h/o right sided Lung ca s/p RT, HLD, and GERD, CBP, overactive bladder presented with pain on the left side of the chest under the left breast and left back that started yesterday and progressively worsened today. No radiation of the pain to the jaw or arms. No recent injury/trauma. Took tylenol without relief. No alleviating or aggravating factors. Denies fevers, chills, chest pain, SOB, abdominal pain, N/V, diarrhea/constipation. Pt reports that her medications ran out a few weeks ago and she has not been taking anything at home.   ER course: SpO2 92% in the ER. Labs: glucose 108, albumin 3.2. COVID and RVP negative. EKG: NSR with HR 95 bpm,  ms, complete RBBB, T wave inversions in lead III, AVF, V3-V4. The T wave inversions in V3-V4 are new compared to prior EKG from 1/2020 (personally reviewed).   CXR: right middle lobe consolidation, right hemidiaphragm elevation, cardiomegaly (personally reviewed).  CT chest: indeterminate small to moderate loculated right pleural effusion with right pleural thickening. Opacification of several right lower lobe bronchi. Partial atelectasis right lower lobe. Pulmonary fibrosis, emphysema, and indeterminate left upper lobe peripheral 1.5 cm nodule.   Reviewed previous imaging     PAST MEDICAL & SURGICAL HISTORY:  Diverticulitis      HTN (hypertension)      Chronic back pain      Lung cancer      Overactive bladder      GERD (gastroesophageal reflux disease)      CAD (coronary artery disease)      HLD (hyperlipidemia)      History of tonsillectomy      S/P appendectomy          PREVIOUS DIAGNOSTIC TESTING:      MEDICATIONS  (STANDING):  azithromycin  IVPB 500 milliGRAM(s) IV Intermittent every 24 hours  budesonide 160 MICROgram(s)/formoterol 4.5 MICROgram(s) Inhaler 2 Puff(s) Inhalation two times a day  carvedilol 6.25 milliGRAM(s) Oral every 12 hours  cefTRIAXone   IVPB 1000 milliGRAM(s) IV Intermittent every 24 hours  coronavirus bivalent (EUA) Booster Vaccine (PFIZER) 0.3 milliLiter(s) IntraMuscular once  enoxaparin Injectable 40 milliGRAM(s) SubCutaneous every 24 hours  famotidine    Tablet 20 milliGRAM(s) Oral at bedtime  guaiFENesin ER 1200 milliGRAM(s) Oral every 12 hours  influenza  Vaccine (HIGH DOSE) 0.7 milliLiter(s) IntraMuscular once  simvastatin 20 milliGRAM(s) Oral at bedtime  tamsulosin 0.4 milliGRAM(s) Oral at bedtime  tiotropium 18 MICROgram(s) Capsule 1 Capsule(s) Inhalation daily    MEDICATIONS  (PRN):  acetaminophen     Tablet .. 650 milliGRAM(s) Oral every 6 hours PRN Mild Pain (1 - 3)  acetaminophen     Tablet .. 650 milliGRAM(s) Oral every 6 hours PRN Temp greater or equal to 38C (100.4F), Mild Pain (1 - 3)  ALBUTerol    90 MICROgram(s) HFA Inhaler 2 Puff(s) Inhalation every 6 hours PRN Shortness of Breath and/or Wheezing  aluminum hydroxide/magnesium hydroxide/simethicone Suspension 30 milliLiter(s) Oral every 4 hours PRN Dyspepsia  melatonin 3 milliGRAM(s) Oral at bedtime PRN Insomnia  ondansetron Injectable 4 milliGRAM(s) IV Push every 8 hours PRN Nausea and/or Vomiting      FAMILY HISTORY:  Family history of cancer in father (Father)        SOCIAL HISTORY:  ***    REVIEW OF SYSTEM:  denies dyspnea    Vital Signs Last 24 Hrs  T(C): 36.9 (25 Oct 2022 16:18), Max: 36.9 (25 Oct 2022 00:00)  T(F): 98.4 (25 Oct 2022 16:18), Max: 98.4 (25 Oct 2022 00:00)  HR: 93 (25 Oct 2022 16:18) (68 - 93)  BP: 99/56 (25 Oct 2022 16:18) (97/63 - 131/93)  BP(mean): 74 (25 Oct 2022 06:29) (74 - 92)  RR: 17 (25 Oct 2022 16:18) (14 - 17)  SpO2: 98% (25 Oct 2022 09:06) (95% - 98%)    Parameters below as of 25 Oct 2022 09:06  Patient On (Oxygen Delivery Method): nasal cannula  O2 Flow (L/min): 2      I&O's Summary    PHYSICAL EXAM  General Appearance: cooperative, no acute distress,   HEENT: PERRL, conjunctiva clear, EOM's intact, non injected pharynx, no exudate, TM   normal  Neck: Supple, , no adenopathy, thyroid: not enlarged, no carotid bruit or JVD  Back: Symmetric, no  tenderness,no soft tissue tenderness  Lungs: coarse jarret  Heart: Regular rate and rhythm, S1, S2 normal, no murmur, rub or gallop  Abdomen: Soft, non-tender, bowel sounds active , no hepatosplenomegaly  Extremities: no cyanosis or edema, no joint swelling  Skin: Skin color, texture normal, no rashes   Neurologic: Alert and oriented X3 , cranial nerves intact, sensory and motor normal,    ECG:    LABS:                          12.3   5.60  )-----------( 203      ( 25 Oct 2022 09:13 )             39.5     10-25    140  |  108  |  25<H>  ----------------------------<  90  4.1   |  27  |  0.70    Ca    8.9      25 Oct 2022 09:13  Mg     2.1     10-24    TPro  7.8  /  Alb  3.2<L>  /  TBili  0.7  /  DBili  x   /  AST  31  /  ALT  27  /  AlkPhos  80  10-24          Pro BNP  -- 10-25 @ 16:25  D Dimer  809 10-25 @ 16:25  Pro BNP  [id number] 10-25 @ 02:00  D Dimer  -- 10-25 @ 02:00              RADIOLOGY & ADDITIONAL STUDIES:    
 Poor historian  does not remember having chest pain yesterday     CHIEF COMPLAINT:    HPI:  PT SEEN AT THE BEDSIDE 10/24/2022 at 11:15 pm.     89 y/o F with PMH CAD, h/o right sided Lung ca s/p RT, HLD, and GERD, CBP, overactive bladder presented with pain on the left side of the chest under the left breast and left back that started day before  yesterday and progressively worsened yesterday . No radiation of the pain to the jaw or arms. No recent injury/trauma. Took tylenol without relief. No alleviating or aggravating factors. Denies fevers, chills, chest pain, SOB, abdominal pain, N/V, diarrhea/constipation. Pt reports that her medications ran out a few weeks ago and she has not been taking anything at home.   Patient says she did not feel well so called EMS , does not remember details of chest pain ,     ER course: SpO2 92% in the ER. Labs: glucose 108, albumin 3.2. COVID and RVP negative. EKG: NSR with HR 95 bpm,  ms, complete RBBB, T wave inversions in lead III, AVF, V3-V4. The T wave inversions in V3-V4 are new compared to prior EKG from 1/2020 (personally reviewed).  may be related to lead location     Imaging:   - CXR: right middle lobe consolidation, right hemidiaphragm elevation, cardiomegaly (personally reviewed).  - CT chest: indeterminate small to moderate loculated right pleural effusion with right pleural thickening. Opacification of several right lower lobe bronchi. Partial atelectasis right lower lobe. Pulmonary fibrosis, emphysema, and indeterminate left upper lobe peripheral 1.5 cm nodule.     Pt was given 324 mg of aspirin, ceftriaxone, azithromycin, tylenol. She is being admitted to telemetry for further management.         PAST MEDICAL & SURGICAL HISTORY:  Diverticulitis      HTN (hypertension)      Chronic back pain      Lung cancer      Overactive bladder      GERD (gastroesophageal reflux disease)      CAD (coronary artery disease)      HLD (hyperlipidemia)      History of tonsillectomy      S/P appendectomy          Allergies    Lotrel (Unknown)  sulfa drugs (Unknown)    Intolerances            Social History:  Lives alone. Has 2 sons, 1 lives in [place]. Independent with ADLs and IADLs. Quit smoking many years ago Denies alcohol and drug use. (25 Oct 2022 01:13)      FAMILY HISTORY:  Family history of cancer in father (Father)        MEDICATIONS:Home Medications:  carvedilol 6.25 mg oral tablet: 1 tab(s) orally 2 times a day (25 Oct 2022 00:49)  famotidine 40 mg oral tablet: 1 tab(s) orally once a day (at bedtime) (25 Oct 2022 00:49)  simvastatin 20 mg oral tablet: 1 tab(s) orally once a day (at bedtime) (25 Oct 2022 00:49)  tamsulosin 0.4 mg oral capsule: 1 cap(s) orally once a day (25 Oct 2022 00:49)  Tylenol 325 mg oral tablet: 2 tab(s) orally every 4 hours, As Needed (25 Oct 2022 00:49)    MEDICATIONS  (STANDING):  azithromycin  IVPB 500 milliGRAM(s) IV Intermittent every 24 hours  carvedilol 6.25 milliGRAM(s) Oral every 12 hours  cefTRIAXone   IVPB 1000 milliGRAM(s) IV Intermittent every 24 hours  coronavirus bivalent (EUA) Booster Vaccine (PFIZER) 0.3 milliLiter(s) IntraMuscular once  enoxaparin Injectable 40 milliGRAM(s) SubCutaneous every 24 hours  famotidine    Tablet 20 milliGRAM(s) Oral at bedtime  influenza  Vaccine (HIGH DOSE) 0.7 milliLiter(s) IntraMuscular once  simvastatin 20 milliGRAM(s) Oral at bedtime  tamsulosin 0.4 milliGRAM(s) Oral at bedtime    MEDICATIONS  (PRN):  acetaminophen     Tablet .. 650 milliGRAM(s) Oral every 6 hours PRN Mild Pain (1 - 3)  acetaminophen     Tablet .. 650 milliGRAM(s) Oral every 6 hours PRN Temp greater or equal to 38C (100.4F), Mild Pain (1 - 3)  aluminum hydroxide/magnesium hydroxide/simethicone Suspension 30 milliLiter(s) Oral every 4 hours PRN Dyspepsia  melatonin 3 milliGRAM(s) Oral at bedtime PRN Insomnia  ondansetron Injectable 4 milliGRAM(s) IV Push every 8 hours PRN Nausea and/or Vomiting      REVIEW OF SYSTEMS:  Limited   CONSTITUTIONAL: No weakness, fevers or chills  EYES/ENT: No visual changes;  No vertigo or throat pain   NECK: No pain or stiffness  RESPIRATORY: No cough, wheezing, hemoptysis; No shortness of breath  CARDIOVASCULAR: No chest pain or palpitations  GASTROINTESTINAL: No abdominal or epigastric pain. No nausea, vomiting, or hematemesis; No diarrhea or constipation. No melena or hematochezia.  GENITOURINARY: No dysuria, frequency or hematuria  NEUROLOGICAL: No numbness or weakness forgetfulness   SKIN: No itching, burning, rashes, or lesions   All other review of systems is negative unless indicated above    Vital Signs Last 24 Hrs  T(C): 36.7 (25 Oct 2022 09:06), Max: 36.9 (25 Oct 2022 00:00)  T(F): 98 (25 Oct 2022 09:06), Max: 98.4 (25 Oct 2022 00:00)  HR: 88 (25 Oct 2022 09:06) (68 - 88)  BP: 105/67 (25 Oct 2022 09:06) (97/63 - 131/93)  BP(mean): 74 (25 Oct 2022 06:29) (74 - 92)  RR: 17 (25 Oct 2022 09:06) (14 - 17)  SpO2: 98% (25 Oct 2022 09:06) (95% - 98%)    Parameters below as of 25 Oct 2022 09:06  Patient On (Oxygen Delivery Method): nasal cannula  O2 Flow (L/min): 2      I&O's Summary      PHYSICAL EXAM:    Constitutional: NAD, awake and alert, well-developed  HEENT: PERR, EOMI,  No oral cyananosis.  Neck:  supple,  No JVD  Respiratory: Breath sounds are equal , basal crackles , right base more than left base   Cardiovascular: S1 and S2, regular rate and rhythm, no Murmurs, gallops or rubs  Gastrointestinal: Bowel Sounds present, soft, nontender.   Extremities: No peripheral edema. No clubbing or cyanosis.  Vascular: 2+ peripheral pulses  Neurological: A/O x 3, no focal deficits forgetful   Musculoskeletal: no calf tenderness.  Skin: No rashes.      LABS: All Labs Reviewed:                        12.3   5.60  )-----------( 203      ( 25 Oct 2022 09:13 )             39.5                         12.6   7.82  )-----------( 223      ( 24 Oct 2022 19:34 )             40.3     25 Oct 2022 09:13    140    |  108    |  25     ----------------------------<  90     4.1     |  27     |  0.70   24 Oct 2022 19:34    140    |  106    |  26     ----------------------------<  108    4.4     |  29     |  0.75     Ca    8.9        25 Oct 2022 09:13  Ca    8.9        24 Oct 2022 19:34  Mg     2.1       24 Oct 2022 19:34    TPro  7.8    /  Alb  3.2    /  TBili  0.7    /  DBili  x      /  AST  31     /  ALT  27     /  AlkPhos  80     24 Oct 2022 19:34            Blood Culture:   10-25 @ 02:00  Pro Bnp [id number]  < from: CT Chest No Cont (10.24.22 @ 20:39) >  MPRESSION:  -indeterminate small to moderate loculated right pleural effusion with   right pleural thickening.    Opacification of several right lower lobe bronchi. Partial atelectasis   right lower lobe.    Pulmonary fibrosis, emphysema, and indeterminate left upper lobe   peripheral 1.5 cm nodule.    --- End of Report ---    < end of copied text >        RADIOLOGY/EKG:    10/24/22 sinus rhythm  RBBB with T  wave inversion inferior, V1 to V3   new T  in setting of RBBB  compared to upright T with incomplete RBBB    old T inversion lead III II    - TroponinI hsT: <-14.72, <-15.86, <-15.63, <-14.40

## 2022-10-25 NOTE — PHYSICAL THERAPY INITIAL EVALUATION ADULT - PERSONAL SAFETY AND JUDGMENT, REHAB EVAL
[de-identified] : Patient arrived for followup of dyspeptic symptoms after cholecystectomy. She was started on omeprazole which was not helping her. I started on pantoprazole which did not make a difference. Now she switched back to omeprazole 40 mg. For the last 2 weeks her symptoms are under good control.She had a upper endoscopy 2 years ago. It had showed esophagitis. intact None

## 2022-10-26 NOTE — PROGRESS NOTE ADULT - ASSESSMENT
1) Aspiration  2) Mechanical Ventilation  3) Abnormal CT Chest  4) History of Right sided lung cancer  5) PEA    89 y/o F with PMH CAD, h/o right sided Lung ca s/p RT, HLD, and GERD, CBP, overactive bladder presented with pain on the left side of the chest under the left breast and left back that started yesterday and progressively worsened today. No radiation of the pain to the jaw or arms. No recent injury/trauma. Took tylenol without relief. No alleviating or aggravating factors. Denies fevers, chills, chest pain, SOB, abdominal pain, N/V, diarrhea/constipation. Pt reports that her medications ran out a few weeks ago and she has not been taking anything at home.   ER course: SpO2 92% in the ER. Labs: glucose 108, albumin 3.2. COVID and RVP negative. EKG: NSR with HR 95 bpm,  ms, complete RBBB, T wave inversions in lead III, AVF, V3-V4. The T wave inversions in V3-V4 are new compared to prior EKG from 1/2020 (personally reviewed).   CXR: right middle lobe consolidation, right hemidiaphragm elevation, cardiomegaly (personally reviewed).  CT chest: indeterminate small to moderate loculated right pleural effusion with right pleural thickening. Opacification of several right lower lobe bronchi. Partial atelectasis right lower lobe. Pulmonary fibrosis, emphysema, and indeterminate left upper lobe peripheral 1.5 cm nodule.   Reviewed previous imaging   At the time of evaluation on 10/25, she was in no active pulm distress  Patient had a PEA Cardiac Arrest 10/25 2/2 to aspiration event  Currently on ventilator Mode: AC/370/24/50/5, PIP < 30  Supportive care for underlying pulmonary fibrosis  ABG 7.41/20/219  Continue (Spiriva/Symbicort)  Recommendations for lung nodule in 10/25 note  30-74 minutes of critical care time provided   Appreciate MICU assistance

## 2022-10-26 NOTE — PROVIDER CONTACT NOTE (OTHER) - SITUATION
Received at 1900 s/p code blue with incontinent diaper. Since placing indwelling catheter, patient has failed to void.
notified Cardiothoracic PA; spoke to MARIAH Pack
Despite receiving 1L LR bolus as well as LR @ 75mL/hr, patient has not voided.
Pt had 6 beats of NSVT on telemetry monitor.

## 2022-10-26 NOTE — PROGRESS NOTE ADULT - ASSESSMENT
89 y/o F with PMH CAD, h/o right sided Lung ca s/p RT, HLD, and GERD, CBP, overactive bladder presented with pain on the left side of the chest under the left breast and left back.   Patient addmited, CT scan was fount to have RLL obstuction, trace localated pleural effusion. Was eating dinner and had a hypoxemic, bradycardic arrest s/p 2 epi, CPR 1 round with rosc.     # S/P cardiac arrest  # Aspiration pna  # AMS  # KARINA- ATN  # Severe PHTN with RV dysfunction  # Shock likely cardiogenic  - Severe pHTN and RV failure, unclear etiology, appears to be chronic, no KIERAN on file, start lasix  - KARINA 2/2 ATN, magana was malposition now repositioned under US guidance. In ATN from shock.  - AMS, post arrest, not waking, up. keep sedation minimal, will consider CTH  - C/W zosyn for aspiration pna, f/u cultures  - F/U official TTE  - On going GOC, patient not highly functional at home, s/p cardiac arrest, in renal failure. Per son Victorina is now DNR. MOLST was filled out. Will have ongoing d/w family.    D/W CCU team during am rounds.  89 y/o F with PMH CAD, h/o right sided Lung ca s/p RT, HLD, and GERD, CBP, overactive bladder presented with pain on the left side of the chest under the left breast and left back.   Patient addmited, CT scan was fount to have RLL obstuction, trace localated pleural effusion. Was eating dinner and had a hypoxemic, bradycardic arrest s/p 2 epi, CPR 1 round with rosc.     # Acute hypoxemic RF  # S/P cardiac arrest  # Aspiration pna  # AMS  # KARINA- ATN  # Severe PHTN with RV dysfunction  # Shock likely cardiogenic\  - Severe pHTN and RV failure, unclear etiology, appears to be chronic, no KIERAN on file, start lasix  - KARINA 2/2 ATN, magana was malposition now repositioned under US guidance. In ATN from shock.  - AMS, post arrest, not waking, up. keep sedation minimal, will consider CTH  - Frequent vent changes, gas now acceptable.   - C/W zosyn for aspiration pna, f/u cultures  - F/U official TTE  - On going GOC, patient not highly functional at home, s/p cardiac arrest, in renal failure. Per son Victorina is now DNR. MOLST was filled out. Will have ongoing d/w family.    D/W CCU team during am rounds.

## 2022-10-26 NOTE — PROVIDER CONTACT NOTE (OTHER) - ACTION/TREATMENT ORDERED:
1L LR bolus ordered and currently receiving LR @ 75mL/hr.
Critical Care PA notified with no new orders received.
MD will order a routine EKG and look into the pts chart further

## 2022-10-26 NOTE — PROGRESS NOTE ADULT - SUBJECTIVE AND OBJECTIVE BOX
Patient is a 89 y/o female who presents with a chief complaint of chest pain (25 Oct 2022 18:14)    BRIEF HOSPITAL COURSE: 89 y/o F with PMHx CAD, lung CA, HLD, GERD, CBP, and overactive bladder who presented to  on 10/25 c/o left breast and left back pain x 1 day. Pt was admitted to medicine for further work-up.    Events last 24 hours: RRT called on 10/25, which progressed to code blue. Pt noted to be in PEA. ACLS was initiated, and ROSC was achieved quickly (epi x 2). Pt was subsequently started on a dopamine infusion, and transferred to CCU for continuation of care. Dopamine discontinued, and pt was started on low dose levophed infusion. Pt with minimal urine output overnight. Will administer 1 L IVF bolus challenge.    PAST MEDICAL & SURGICAL HISTORY:  Diverticulitis  HTN (hypertension)  Chronic back pain  Lung cancer  Overactive bladder  GERD (gastroesophageal reflux disease)  CAD (coronary artery disease)  HLD (hyperlipidemia)  History of tonsillectomy  S/P appendectomy    Review of Systems:  Unable to obtain. Intubated/sedated.    Medications:  piperacillin/tazobactam IVPB.. 3.375 Gram(s) IV Intermittent every 8 hours  carvedilol 6.25 milliGRAM(s) Oral every 12 hours  norepinephrine Infusion 0.05 MICROgram(s)/kG/Min IV Continuous <Continuous>  tamsulosin 0.4 milliGRAM(s) Oral at bedtime  ALBUTerol    90 MICROgram(s) HFA Inhaler 2 Puff(s) Inhalation every 6 hours PRN  budesonide 160 MICROgram(s)/formoterol 4.5 MICROgram(s) Inhaler 2 Puff(s) Inhalation two times a day  guaiFENesin ER 1200 milliGRAM(s) Oral every 12 hours  tiotropium 18 MICROgram(s) Capsule 1 Capsule(s) Inhalation daily  acetaminophen     Tablet .. 650 milliGRAM(s) Oral every 6 hours PRN  acetaminophen     Tablet .. 650 milliGRAM(s) Oral every 6 hours PRN  melatonin 3 milliGRAM(s) Oral at bedtime PRN  ondansetron Injectable 4 milliGRAM(s) IV Push every 8 hours PRN  propofol Infusion 10 MICROgram(s)/kG/Min IV Continuous <Continuous>  enoxaparin Injectable 40 milliGRAM(s) SubCutaneous every 24 hours  aluminum hydroxide/magnesium hydroxide/simethicone Suspension 30 milliLiter(s) Oral every 4 hours PRN  famotidine    Tablet 20 milliGRAM(s) Oral at bedtime  simvastatin 20 milliGRAM(s) Oral at bedtime  lactated ringers. 1000 milliLiter(s) IV Continuous <Continuous>  coronavirus bivalent (EUA) Booster Vaccine (PFIZER) 0.3 milliLiter(s) IntraMuscular once  influenza  Vaccine (HIGH DOSE) 0.7 milliLiter(s) IntraMuscular once  chlorhexidine 0.12% Liquid 15 milliLiter(s) Oral Mucosa every 12 hours    Mode: AC/ CMV (Assist Control/ Continuous Mandatory Ventilation)  RR (machine): 18  TV (machine): 350  FiO2: 100  PEEP: 5  ITime: 1  PIP: 32    ICU Vital Signs Last 24 Hrs  T(C): 37.1 (26 Oct 2022 00:00), Max: 37.1 (26 Oct 2022 00:00)  T(F): 98.8 (26 Oct 2022 00:00), Max: 98.8 (26 Oct 2022 00:00)  HR: 86 (26 Oct 2022 01:00) (81 - 116)  BP: 118/66 (26 Oct 2022 01:00) (86/53 - 143/76)  BP(mean): 78 (26 Oct 2022 01:00) (52 - 93)  ABP: --  ABP(mean): --  RR: 24 (26 Oct 2022 01:00) (17 - 30)  SpO2: 98% (26 Oct 2022 01:00) (98% - 100%)    O2 Parameters below as of 26 Oct 2022 01:00  Patient On (Oxygen Delivery Method): ventilator    O2 Concentration (%): 50    ABG - ( 25 Oct 2022 21:38 )  pH, Arterial: 7.34  pH, Blood: x     /  pCO2: 25    /  pO2: 278   / HCO3: 14    / Base Excess: -10.5 /  SaO2: 100       I&O's Detail    25 Oct 2022 07:01  -  26 Oct 2022 01:46  --------------------------------------------------------  IN:    Lactated Ringers: 525 mL    Lactated Ringers Bolus: 1000 mL  Total IN: 1525 mL    OUT:  Total OUT: 0 mL    Total NET: 1525 mL    LABS:                        11.7   7.96  )-----------( 142      ( 25 Oct 2022 20:17 )             37.9     10-25    139  |  106  |  35<H>  ----------------------------<  215<H>  5.1   |  20<L>  |  1.16    Ca    8.1<L>      25 Oct 2022 20:17  Phos  6.7     10-25  Mg     2.2     10-25    TPro  6.6  /  Alb  2.5<L>  /  TBili  0.6  /  DBili  x   /  AST  170<H>  /  ALT  90<H>  /  AlkPhos  114  10-25    CAPILLARY BLOOD GLUCOSE    POCT Blood Glucose.: 154 mg/dL (25 Oct 2022 18:49)    CULTURES:  Rapid RVP Result: NotDetec (10-24 @ 19:34)    Physical Examination:    General: Well appearing, lying in bed in NAD.      HEENT: Pupils equal, reactive to light. Symmetric. No scleral icterus or injection.    PULM: Clear to auscultation B/L. No wheezes, rales, or rhonchi apprecaited. No significant sputum production or increased respiratory effort.    NECK: Supple, no lymphadenopathy, trachea midline.    CVS: Regular rate and rhythm, no murmurs appreciated, +s1/s2.    ABD: Soft, nondistended, nontender, normoactive bowel sounds.    EXT: No edema, nontender.    SKIN: Warm and well perfused, no rashes noted.    NEURO: Alert, oriented, interactive, nonfocal.    RADIOLOGY:  < from: CT Chest No Cont (10.24.22 @ 20:39) >  ACC: [id number] EXAM:  CT CHEST                          PROCEDURE DATE:  10/24/2022      INTERPRETATION:  EXAMINATION: CT CHEST    CLINICAL INDICATION: Abnormal chest x-ray.    TECHNIQUE: Noncontrast CT of the chest was obtained.    COMPARISON: None.    FINDINGS:    AIRWAYS AND LUNGS: Opacification of several right lower lobe bronchi.   Partial atelectasis right lower lobe. Peripheral reticulation and   traction bronchiectasis. Emphysema. Left upper lobe peripheral 1.5 cm   nodule.    MEDIASTINUM AND PLEURA: There are no enlarged mediastinal, hilar or   axillary lymph nodes. The visualized portion of the thyroid gland is   unremarkable. Trace left pleural effusion. Age-indeterminate small to   moderate loculated right pleural effusion with right pleural thickening.   There is no pneumothorax.    HEART AND VESSELS: There is cardiomegaly.  There are atherosclerotic   calcifications of the aorta and coronary arteries.  There is trace   pericardial fluid.  Aortic valve calcification    UPPER ABDOMEN: Images of the upper abdomen demonstrate no abnormality.    BONES AND SOFT TISSUES: The bones are unremarkable.  The soft tissues are   unremarkable.    IMPRESSION:  -indeterminate small to moderate loculated right pleural effusion with   right pleural thickening.    Opacification of several right lower lobe bronchi. Partial atelectasis   right lower lobe.    Pulmonary fibrosis, emphysema, and indeterminate left upper lobe   peripheral 1.5 cm nodule.    --- End of Report ---    MANUEL DEMPSEY MD; Attending Radiologist  This document has been electronically signed. Oct 24 2022  8:52PM    < end of copied text >    CRITICAL CARE TIME SPENT: 41 minutes Patient is a 89 y/o female who presents with a chief complaint of chest pain (25 Oct 2022 18:14)    BRIEF HOSPITAL COURSE: 89 y/o F with PMHx CAD, lung CA, HLD, GERD, CBP, and overactive bladder who presented to  on 10/25 c/o left breast and left back pain x 1 day. Pt was admitted to medicine for further work-up.    Events last 24 hours: RRT called on 10/25, which progressed to code blue. Pt noted to be in PEA. ACLS was initiated, and ROSC was achieved quickly (epi x 2). Pt was subsequently started on a dopamine infusion, and transferred to CCU for continuation of care. Dopamine discontinued, and pt was started on low dose levophed infusion. Pt with minimal urine output overnight. Will administer 1 L IVF bolus challenge.    PAST MEDICAL & SURGICAL HISTORY:  Diverticulitis  HTN (hypertension)  Chronic back pain  Lung cancer  Overactive bladder  GERD (gastroesophageal reflux disease)  CAD (coronary artery disease)  HLD (hyperlipidemia)  History of tonsillectomy  S/P appendectomy    Review of Systems:  Unable to obtain. Intubated/sedated.    Medications:  piperacillin/tazobactam IVPB.. 3.375 Gram(s) IV Intermittent every 8 hours  carvedilol 6.25 milliGRAM(s) Oral every 12 hours  norepinephrine Infusion 0.05 MICROgram(s)/kG/Min IV Continuous <Continuous>  tamsulosin 0.4 milliGRAM(s) Oral at bedtime  ALBUTerol    90 MICROgram(s) HFA Inhaler 2 Puff(s) Inhalation every 6 hours PRN  budesonide 160 MICROgram(s)/formoterol 4.5 MICROgram(s) Inhaler 2 Puff(s) Inhalation two times a day  guaiFENesin ER 1200 milliGRAM(s) Oral every 12 hours  tiotropium 18 MICROgram(s) Capsule 1 Capsule(s) Inhalation daily  acetaminophen     Tablet .. 650 milliGRAM(s) Oral every 6 hours PRN  acetaminophen     Tablet .. 650 milliGRAM(s) Oral every 6 hours PRN  melatonin 3 milliGRAM(s) Oral at bedtime PRN  ondansetron Injectable 4 milliGRAM(s) IV Push every 8 hours PRN  propofol Infusion 10 MICROgram(s)/kG/Min IV Continuous <Continuous>  enoxaparin Injectable 40 milliGRAM(s) SubCutaneous every 24 hours  aluminum hydroxide/magnesium hydroxide/simethicone Suspension 30 milliLiter(s) Oral every 4 hours PRN  famotidine    Tablet 20 milliGRAM(s) Oral at bedtime  simvastatin 20 milliGRAM(s) Oral at bedtime  lactated ringers. 1000 milliLiter(s) IV Continuous <Continuous>  coronavirus bivalent (EUA) Booster Vaccine (PFIZER) 0.3 milliLiter(s) IntraMuscular once  influenza  Vaccine (HIGH DOSE) 0.7 milliLiter(s) IntraMuscular once  chlorhexidine 0.12% Liquid 15 milliLiter(s) Oral Mucosa every 12 hours    Mode: AC/ CMV (Assist Control/ Continuous Mandatory Ventilation)  RR (machine): 18  TV (machine): 350  FiO2: 100  PEEP: 5  ITime: 1  PIP: 32    ICU Vital Signs Last 24 Hrs  T(C): 37.1 (26 Oct 2022 00:00), Max: 37.1 (26 Oct 2022 00:00)  T(F): 98.8 (26 Oct 2022 00:00), Max: 98.8 (26 Oct 2022 00:00)  HR: 86 (26 Oct 2022 01:00) (81 - 116)  BP: 118/66 (26 Oct 2022 01:00) (86/53 - 143/76)  BP(mean): 78 (26 Oct 2022 01:00) (52 - 93)  ABP: --  ABP(mean): --  RR: 24 (26 Oct 2022 01:00) (17 - 30)  SpO2: 98% (26 Oct 2022 01:00) (98% - 100%)    O2 Parameters below as of 26 Oct 2022 01:00  Patient On (Oxygen Delivery Method): ventilator    O2 Concentration (%): 50    ABG - ( 25 Oct 2022 21:38 )  pH, Arterial: 7.34  pH, Blood: x     /  pCO2: 25    /  pO2: 278   / HCO3: 14    / Base Excess: -10.5 /  SaO2: 100       I&O's Detail    25 Oct 2022 07:01  -  26 Oct 2022 01:46  --------------------------------------------------------  IN:    Lactated Ringers: 525 mL    Lactated Ringers Bolus: 1000 mL  Total IN: 1525 mL    OUT:  Total OUT: 0 mL    Total NET: 1525 mL    LABS:                        11.7   7.96  )-----------( 142      ( 25 Oct 2022 20:17 )             37.9     10-25    139  |  106  |  35<H>  ----------------------------<  215<H>  5.1   |  20<L>  |  1.16    Ca    8.1<L>      25 Oct 2022 20:17  Phos  6.7     10-25  Mg     2.2     10-25    TPro  6.6  /  Alb  2.5<L>  /  TBili  0.6  /  DBili  x   /  AST  170<H>  /  ALT  90<H>  /  AlkPhos  114  10-25    CAPILLARY BLOOD GLUCOSE    POCT Blood Glucose.: 154 mg/dL (25 Oct 2022 18:49)    CULTURES:  Rapid RVP Result: NotDetec (10-24 @ 19:34)    Physical Examination:    General: Intubated.    HEENT: Pupils equal, reactive to light. Symmetric. No scleral icterus or injection.    PULM: Clear to auscultation b/l.    NECK: Supple, no lymphadenopathy, trachea midline.    CVS: Regular rate and rhythm, no murmurs appreciated, +s1/s2.    ABD: Soft, nondistended, nontender, normoactive bowel sounds.    EXT: No edema, nontender.    SKIN: Warm and well perfused, no rashes noted.    NEURO: Opens eyes, moves extremities spontaneously, does not follow commands.    RADIOLOGY:  < from: CT Chest No Cont (10.24.22 @ 20:39) >  ACC: [id number] EXAM:  CT CHEST                          PROCEDURE DATE:  10/24/2022      INTERPRETATION:  EXAMINATION: CT CHEST    CLINICAL INDICATION: Abnormal chest x-ray.    TECHNIQUE: Noncontrast CT of the chest was obtained.    COMPARISON: None.    FINDINGS:    AIRWAYS AND LUNGS: Opacification of several right lower lobe bronchi.   Partial atelectasis right lower lobe. Peripheral reticulation and   traction bronchiectasis. Emphysema. Left upper lobe peripheral 1.5 cm   nodule.    MEDIASTINUM AND PLEURA: There are no enlarged mediastinal, hilar or   axillary lymph nodes. The visualized portion of the thyroid gland is   unremarkable. Trace left pleural effusion. Age-indeterminate small to   moderate loculated right pleural effusion with right pleural thickening.   There is no pneumothorax.    HEART AND VESSELS: There is cardiomegaly.  There are atherosclerotic   calcifications of the aorta and coronary arteries.  There is trace   pericardial fluid.  Aortic valve calcification    UPPER ABDOMEN: Images of the upper abdomen demonstrate no abnormality.    BONES AND SOFT TISSUES: The bones are unremarkable.  The soft tissues are   unremarkable.    IMPRESSION:  -indeterminate small to moderate loculated right pleural effusion with   right pleural thickening.    Opacification of several right lower lobe bronchi. Partial atelectasis   right lower lobe.    Pulmonary fibrosis, emphysema, and indeterminate left upper lobe   peripheral 1.5 cm nodule.    --- End of Report ---    MANUEL DEMPSEY MD; Attending Radiologist  This document has been electronically signed. Oct 24 2022  8:52PM    < end of copied text >    CRITICAL CARE TIME SPENT: 41 minutes

## 2022-10-26 NOTE — CHART NOTE - NSCHARTNOTEFT_GEN_A_CORE
Clinical Nutrition BRIEF Note    * 89 yo admitted for Chest pain. Admit for acute hypoxic respiratory distress. Is full code. Code blue called last night 2/2 SOB after eating. Was in PEA cardiac arrest. Transferred to CCU and now intubated. On propofol 11.3 mL/hr (providing ~298 kcal).    *being followed up for: pt now intubated, TF recs provided below via OGT.     *labs reviewed; 10-26    136  |  105  |  38<H>  ----------------------------<  130<H>  4.7   |  23  |  1.33<H>    Ca    8.1<L>      26 Oct 2022 06:26  Phos  4.1     10-26  Mg     2.1     10-26    TPro  6.6  /  Alb  2.5<L>  /  TBili  0.6  /  DBili  x   /  AST  170<H>  /  ALT  90<H>  /  AlkPhos  114  10-25      *BM documented: none documented.       Diet, NPO (10-25-22 @ 19:43)    *Pt meets criteria for severe malnutrition in the context of acute illness r/t inability to meet increased needs 2/2 acute hypoxic respiratory distress AEB severe muscle / fat wasting, consuming <50% of ENN > 5 days      ESTIMATED NUTR NEEDS based on 42.6 Kg  1491- 1704 Kcal (35- 40 Kcal/Kg)  64- 85 g (1.5- 2 g/Kg)  1278- 1491 mL (30-35 mL/Kg)    RECOMMENDATIONS  1) Initiate Nepro Carb-steady (2/2 altered renal labs) @ 10 cc/hr, increase by 10 cc/hr q10 hours until goal rate of 35 cc/hr (total volume 700 mL) met with 2 packets of Prosource TF. Will provide ~ 1619 kcal, 79 g protein, and 508 mL free water.   2) pt is at HIGH RISK for refeeding syndrome - monitor lytes/ min and replete prn  3) Monitor TF tolerance; maintain aspiration precautions, back of bed >45 degrees.   4) daily wts to track/trend changes  5) Monitor bowel movements, if no BM for >3 days, consider implementing bowel regimen.   6) Confirm goals of care regarding LONG-TERM nutrition support   7) Monitor hydration - provide free water as per critical care MD    *will continue to monitor and adjust nutrition plan prn*  Fela Melgar MS, RDN, [facility] [phone]  Nutrition

## 2022-10-26 NOTE — PROGRESS NOTE ADULT - SUBJECTIVE AND OBJECTIVE BOX
Interval Events: Patient was seen and examined at bedside.    Not making urine, magana cath, adjusted with return of urine. POCUS with larged RV, Severe PHTN, abnormal septal kinetics.     REVIEW OF SYSTEMS:  Constitutional: [ ] fevers [ ] chills [ ] weight loss [ ] weight gain  CV: [ ] chest pain [ ] orthopnea [ ] palpitations [ ] murmur  Resp: [ ] cough [ ] shortness of breath [ ] dyspnea [ ] wheezing [ ] sputum [ ] hemoptysis  [ ] All other systems negative  [ x] Unable to assess ROS because intubated and sedated.     OBJECTIVE:  ICU Vital Signs Last 24 Hrs  T(C): 36.3 (26 Oct 2022 16:45), Max: 37.1 (26 Oct 2022 00:00)  T(F): 97.4 (26 Oct 2022 16:45), Max: 98.8 (26 Oct 2022 00:00)  HR: 81 (26 Oct 2022 16:00) (75 - 116)  BP: 104/66 (26 Oct 2022 16:00) (71/50 - 143/76)  BP(mean): 75 (26 Oct 2022 16:00) (52 - 93)  ABP: --  ABP(mean): --  RR: 23 (26 Oct 2022 16:00) (20 - 30)  SpO2: 96% (26 Oct 2022 16:00) (96% - 100%)    O2 Parameters below as of 26 Oct 2022 08:00  Patient On (Oxygen Delivery Method): ventilator          Mode: AC/ CMV (Assist Control/ Continuous Mandatory Ventilation), RR (machine): 16, TV (machine): 370, FiO2: 30, PEEP: 5, ITime: 1, PIP: 23    10-25 @ :  -  10-26 @ 07:00  --------------------------------------------------------  IN: 2357.4 mL / OUT: 0 mL / NET: 2357.4 mL    10-26 @ 07:  -  10-26 @ 17:35  --------------------------------------------------------  IN: 330 mL / OUT: 157 mL / NET: 173 mL      CAPILLARY BLOOD GLUCOSE      POCT Blood Glucose.: 154 mg/dL (25 Oct 2022 18:49)    PHYSICAL EXAM:    Constitutional: well-developed; well-groomed; malnourished thin, frail appearing   Eyes: PERRL; EOMI  ENMT: Normal oropharnxy, no oral lesions, no erythema, no exudates  Neck:  Supple; no JVD; normal thyroid gland  Respiratory: airway patent; breath sounds equal; good air movement, no wheezing, no crackles, no rhonchi. no increase in WOB  Cardiovascular: regular rate and rhythm  no rub , no murmur, no gallops.   Gastrointestinal: soft; no distention, normal BS, no TTP, no organomegaly, no ascites.  Extremities: no clubbing; no cyanosis; no pedal edema, non-tender to palpation, DP and Radial pulses intact.  Neurological: alert and oriented x 0 intubated and sedated.   Skin: warm and dry; color normal: no rash: no ulcers  Psychiatric: Calm, no SI/HI          HOSPITAL MEDICATIONS:  MEDICATIONS  (STANDING):  budesonide 160 MICROgram(s)/formoterol 4.5 MICROgram(s) Inhaler 2 Puff(s) Inhalation two times a day  carvedilol 6.25 milliGRAM(s) Oral every 12 hours  chlorhexidine 0.12% Liquid 15 milliLiter(s) Oral Mucosa every 12 hours  coronavirus bivalent (EUA) Booster Vaccine (PFIZER) 0.3 milliLiter(s) IntraMuscular once  enoxaparin Injectable 40 milliGRAM(s) SubCutaneous every 24 hours  famotidine    Tablet 20 milliGRAM(s) Oral at bedtime  furosemide   Injectable 60 milliGRAM(s) IV Push every 12 hours  guaiFENesin ER 1200 milliGRAM(s) Oral every 12 hours  influenza  Vaccine (HIGH DOSE) 0.7 milliLiter(s) IntraMuscular once  norepinephrine Infusion 0.05 MICROgram(s)/kG/Min (4.26 mL/Hr) IV Continuous <Continuous>  piperacillin/tazobactam IVPB.. 3.375 Gram(s) IV Intermittent every 8 hours  propofol Infusion 10 MICROgram(s)/kG/Min (2.72 mL/Hr) IV Continuous <Continuous>  simvastatin 20 milliGRAM(s) Oral at bedtime  tamsulosin 0.4 milliGRAM(s) Oral at bedtime  tiotropium 18 MICROgram(s) Capsule 1 Capsule(s) Inhalation daily    MEDICATIONS  (PRN):  acetaminophen     Tablet .. 650 milliGRAM(s) Oral every 6 hours PRN Mild Pain (1 - 3)  acetaminophen     Tablet .. 650 milliGRAM(s) Oral every 6 hours PRN Temp greater or equal to 38C (100.4F), Mild Pain (1 - 3)  ALBUTerol    90 MICROgram(s) HFA Inhaler 2 Puff(s) Inhalation every 6 hours PRN Shortness of Breath and/or Wheezing  aluminum hydroxide/magnesium hydroxide/simethicone Suspension 30 milliLiter(s) Oral every 4 hours PRN Dyspepsia  melatonin 3 milliGRAM(s) Oral at bedtime PRN Insomnia  ondansetron Injectable 4 milliGRAM(s) IV Push every 8 hours PRN Nausea and/or Vomiting      LABS:                        11.5   8.42  )-----------( 142      ( 26 Oct 2022 06:26 )             35.2     Hgb Trend: 11.5<--, 11.7<--, 12.3<--, 12.6<--  10-26    135  |  105  |  41<H>  ----------------------------<  120<H>  5.0   |  23  |  1.64<H>    Ca    8.0<L>      26 Oct 2022 14:22  Phos  4.1     10-26  Mg     2.1     10-26    TPro  6.6  /  Alb  2.5<L>  /  TBili  0.6  /  DBili  x   /  AST  170<H>  /  ALT  90<H>  /  AlkPhos  114  10-25    Creatinine Trend: 1.64<--, 1.33<--, 1.16<--, 0.70<--, 0.75<--    Urinalysis Basic - ( 26 Oct 2022 11:30 )    Color: Yellow / Appearance: Slightly Turbid / S.015 / pH: x  Gluc: x / Ketone: Trace  / Bili: Negative / Urobili: Negative   Blood: x / Protein: 100 / Nitrite: Negative   Leuk Esterase: Small / RBC: 6-10 /HPF / WBC 6-10   Sq Epi: x / Non Sq Epi: Few / Bacteria: Moderate      Arterial Blood Gas:  10-26 @ 06:25  7.41/20/219/13/99/-9.6  ABG lactate: --  Arterial Blood Gas:  10-25 @ 21:38  7.34/25/278/14/100/-10.5  ABG lactate: --  Arterial Blood Gas:  10-25 @ 20:40  7.18/55/45/20/58/-8.3  ABG lactate: --    Venous Blood Gas:  10-26 @ 14:22  7.47/31/208/23/99.9  VBG Lactate: --    MICROBIOLOGY:     RADIOLOGY & EKG:  [x ] Reviewed and interpreted by me

## 2022-10-26 NOTE — PROGRESS NOTE ADULT - SUBJECTIVE AND OBJECTIVE BOX
Patient is a 89y old  Female who presents with a chief complaint of Chest pain (25 Oct 2022 13:47)      HPI:  PT SEEN AT THE BEDSIDE 10/24/2022 at 11:15 pm.     89 y/o F with PMH CAD, h/o right sided Lung ca s/p RT, HLD, and GERD, CBP, overactive bladder presented with pain on the left side of the chest under the left breast and left back that started yesterday and progressively worsened today. No radiation of the pain to the jaw or arms. No recent injury/trauma. Took tylenol without relief. No alleviating or aggravating factors. Denies fevers, chills, chest pain, SOB, abdominal pain, N/V, diarrhea/constipation. Pt reports that her medications ran out a few weeks ago and she has not been taking anything at home.   ER course: SpO2 92% in the ER. Labs: glucose 108, albumin 3.2. COVID and RVP negative. EKG: NSR with HR 95 bpm,  ms, complete RBBB, T wave inversions in lead III, AVF, V3-V4. The T wave inversions in V3-V4 are new compared to prior EKG from 1/2020 (personally reviewed).   CXR: right middle lobe consolidation, right hemidiaphragm elevation, cardiomegaly (personally reviewed).  CT chest: indeterminate small to moderate loculated right pleural effusion with right pleural thickening. Opacification of several right lower lobe bronchi. Partial atelectasis right lower lobe. Pulmonary fibrosis, emphysema, and indeterminate left upper lobe peripheral 1.5 cm nodule.   Reviewed previous imaging     10/26/2022  Patient had a PEA Cardiac Arrest 10/25 2/2 to aspiration event  Currently on ventilator Mode: AC/370/24/50/5, PIP < 30          HTN (hypertension)      Chronic back pain      Lung cancer      Overactive bladder      GERD (gastroesophageal reflux disease)      CAD (coronary artery disease)      HLD (hyperlipidemia)      History of tonsillectomy      S/P appendectomy          PREVIOUS DIAGNOSTIC TESTING:      MEDICATIONS  (STANDING):  azithromycin  IVPB 500 milliGRAM(s) IV Intermittent every 24 hours  budesonide 160 MICROgram(s)/formoterol 4.5 MICROgram(s) Inhaler 2 Puff(s) Inhalation two times a day  carvedilol 6.25 milliGRAM(s) Oral every 12 hours  cefTRIAXone   IVPB 1000 milliGRAM(s) IV Intermittent every 24 hours  coronavirus bivalent (EUA) Booster Vaccine (PFIZER) 0.3 milliLiter(s) IntraMuscular once  enoxaparin Injectable 40 milliGRAM(s) SubCutaneous every 24 hours  famotidine    Tablet 20 milliGRAM(s) Oral at bedtime  guaiFENesin ER 1200 milliGRAM(s) Oral every 12 hours  influenza  Vaccine (HIGH DOSE) 0.7 milliLiter(s) IntraMuscular once  simvastatin 20 milliGRAM(s) Oral at bedtime  tamsulosin 0.4 milliGRAM(s) Oral at bedtime  tiotropium 18 MICROgram(s) Capsule 1 Capsule(s) Inhalation daily    MEDICATIONS  (PRN):  acetaminophen     Tablet .. 650 milliGRAM(s) Oral every 6 hours PRN Mild Pain (1 - 3)  acetaminophen     Tablet .. 650 milliGRAM(s) Oral every 6 hours PRN Temp greater or equal to 38C (100.4F), Mild Pain (1 - 3)  ALBUTerol    90 MICROgram(s) HFA Inhaler 2 Puff(s) Inhalation every 6 hours PRN Shortness of Breath and/or Wheezing  aluminum hydroxide/magnesium hydroxide/simethicone Suspension 30 milliLiter(s) Oral every 4 hours PRN Dyspepsia  melatonin 3 milliGRAM(s) Oral at bedtime PRN Insomnia  ondansetron Injectable 4 milliGRAM(s) IV Push every 8 hours PRN Nausea and/or Vomiting      FAMILY HISTORY:  Family history of cancer in father (Father)        SOCIAL HISTORY:  ***    REVIEW OF SYSTEM:  denies dyspnea    ICU Vital Signs Last 24 Hrs  T(C): 37.1 (26 Oct 2022 00:00), Max: 37.1 (26 Oct 2022 00:00)  T(F): 98.8 (26 Oct 2022 00:00), Max: 98.8 (26 Oct 2022 00:00)  HR: 84 (26 Oct 2022 06:00) (82 - 116)  BP: 115/69 (26 Oct 2022 06:00) (86/53 - 143/76)  BP(mean): 81 (26 Oct 2022 06:00) (52 - 93)  ABP: --  ABP(mean): --  RR: 24 (26 Oct 2022 06:00) (17 - 30)  SpO2: 97% (26 Oct 2022 06:00) (97% - 100%)    O2 Parameters below as of 26 Oct 2022 06:00  Patient On (Oxygen Delivery Method): ventilator    O2 Concentration (%): 50          I&O's Summary    PHYSICAL EXAM  General Appearance: Intubated  HEENT: PERRL, conjunctiva clear, EOM's intact, non injected pharynx, no exudate, TM   normal  Neck: Supple, , no adenopathy, thyroid: not enlarged, no carotid bruit or JVD  Back: Symmetric, no  tenderness,no soft tissue tenderness  Lungs: coarse jarret  Heart: Regular rate and rhythm, S1, S2 normal, no murmur, rub or gallop  Abdomen: Soft, non-tender, bowel sounds active , no hepatosplenomegaly  Extremities: no cyanosis or edema, no joint swelling  Skin: Skin color, texture normal, no rashes   Neurologic: Sedated    ECG:    LABS:                          12.3   5.60  )-----------( 203      ( 25 Oct 2022 09:13 )             39.5     10-25    140  |  108  |  25<H>  ----------------------------<  90  4.1   |  27  |  0.70    Ca    8.9      25 Oct 2022 09:13  Mg     2.1     10-24    TPro  7.8  /  Alb  3.2<L>  /  TBili  0.7  /  DBili  x   /  AST  31  /  ALT  27  /  AlkPhos  80  10-24          Pro BNP  -- 10-25 @ 16:25  D Dimer  809 10-25 @ 16:25  Pro BNP  [id number] 10-25 @ 02:00  D Dimer  -- 10-25 @ 02:00              RADIOLOGY & ADDITIONAL STUDIES:

## 2022-10-26 NOTE — PROGRESS NOTE ADULT - ASSESSMENT
89 y/o F with PMHx CAD, lung CA, HLD, GERD, CBP, and overactive bladder admitted with:    1.    PLAN:  -  89 y/o F with PMHx CAD, lung CA, HLD, GERD, CBP, and overactive bladder admitted with:    1. Cardiac arrest, PEA  2. Acute hypoxic respiratory failure  3. Shock  4. Lactic acidosis  5. Aspiration PNA    PLAN:  - Low dose sedation with propofol to facilitate ventilator synchrony. Waking up, however, not following commands. Wean sedation today to better assess mental status.  - Actively titrating levophed to maintain MAP > 65.  - Appears cardiac arrest was 2/2 hypoxia/aspiration event.  - Actively titrating ventilator settings to maintain SpO2 > 92. Post intubation ABG obtained. Appropriate ventilator adjustments were made.   - Poor urine output. Will fluid challenge with 1 L bolus plus maintenance fluid.  - Charles in place for strict output monitoring.  - Lactate clearing.  - Empiric abx coverage with zosyn for aspiration.  - GI prophylaxis with protonix.  - Start tube feeds today.  - Lower extremity dopplers negative for DVT.  - Chemical DVT prophylaxis with lovenox.
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
## 2022-10-26 NOTE — PROVIDER CONTACT NOTE (OTHER) - ASSESSMENT
Pt asymptomatic, appeared to be resting comfortably in bed,, VS as charted,
Bladder scanned with total urine approximately 40mL.
Bladder scanned with total urine approximately 20mL.

## 2022-10-27 NOTE — PROGRESS NOTE ADULT - SUBJECTIVE AND OBJECTIVE BOX
Subjective:  Pt in bed, intubated and sedated     T(C): 37.1 (10-27-22 @ 06:00), Max: 37.1 (10-27-22 @ 06:00)  HR: 88 (10-27-22 @ 09:00) (75 - 88)  BP: 97/63 (10-27-22 @ 09:00) (71/50 - 114/95)  ABP: --  ABP(mean): --  RR: 20 (10-27-22 @ 09:00) (0 - 23)  SpO2: 96% (10-27-22 @ 09:00) (94% - 100%)   Wt(kg): --  CVP(mm Hg): --  CO: --  CI: --  PA: --                                              Tele: SR            10-27    133<L>  |  101  |  52<H>  ----------------------------<  137<H>  4.7   |  23  |  2.26<H>    Ca    7.8<L>      27 Oct 2022 06:14  Phos  5.3     10-27  Mg     2.2     10-27    TPro  6.6  /  Alb  2.5<L>  /  TBili  0.6  /  DBili  x   /  AST  170<H>  /  ALT  90<H>  /  AlkPhos  114  10-25                               11.8   9.32  )-----------( 167      ( 27 Oct 2022 06:14 )             36.1                 CAPILLARY BLOOD GLUCOSE               CXR:  < from: Xray Chest 1 View-PORTABLE IMMEDIATE (Xray Chest 1 View-PORTABLE IMMEDIATE .) (10.25.22 @ 19:54) >  INTERPRETATION:  Chest and bilateral RIBS. Patient has chest wall pain.    AP semierect chest on October 25, 2022 at 7:39 PM.    Heart is enlarged. Calcified mitral area noted.    Scattered pleural density in the mid lower lung field with adjacent   infiltrate is roughly similar to October 24.    Present film shows endotracheal tube and nasogastric tube inserted new   since prior.    Bilateral RIBS. 3 views. There is a mild right curve at thoracolumbar   junction and a mild left lumbar curve.    No gross fracture.    IMPRESSION: Significant right lung findings again noted. Tubes in place   as above. No gross fracture.    < end of copied text >          exam   Neuro:  intubated and sedated   Pulm: decreased b/l   CV: RRR S1 S2   Abd: soft   Extremities:  warm         Assessment:  89yFemale    with PAST MEDICAL & SURGICAL HISTORY:  Diverticulitis      HTN (hypertension)      Chronic back pain      Lung cancer      Overactive bladder      GERD (gastroesophageal reflux disease)      CAD (coronary artery disease)      HLD (hyperlipidemia)      History of tonsillectomy      S/P appendectomy            Plan:
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
## 2022-10-27 NOTE — DISCHARGE NOTE FOR THE EXPIRED PATIENT - HOSPITAL COURSE
89 y/o F with PMH CAD, h/o right sided Lung ca s/p RT, HLD, and GERD, CBP, overactive bladder presented with pain on the left side of the chest under the left breast and left back.   Patient admitted to the hospital, CT scan was fount to have RLL obstuction, trace localated pleural effusion. Was eating dinner and had a hypoxemic, bradycardic arrest s/p 2 epi, CPR 1 round with rosc.     Per the family she was having progressive LE edema, bed bound with limited functional status. Also concerning for aspiration events at home.  Bedside POCUS with severe RV dysfunction. She was in shock post arrest, and ATN with poor urine output. Was on mechanical ventilation. After multiple GOC with the family it was decided that the patient undergo compassionate extubation. Patient was started on medications for symptoms and extubated.

## 2022-10-27 NOTE — PROGRESS NOTE ADULT - ASSESSMENT
89 y/o F with PMH CAD, h/o right sided Lung ca s/p RT, HLD, and GERD, CBP, overactive bladder presented with pain on the left side of the chest under the left breast and left back.   Patient addmited, CT scan was fount to have RLL obstruction trace loculated pleural effusion. Was eating dinner and had a hypoxemic, bradycardic arrest s/p 2 epi, CPR 1 round with rosc.     # Acute hypoxemic RF  # S/P cardiac arrest  # Aspiration pna  # AMS  # KARINA- ATN  # Severe PHTN with RV dysfunction  # Shock likely cardiogenic\  - Severe pHTN and RV failure, unclear etiology, appears to be chronic, no KIERAN on file, start lasix  - KARINA 2/2 ATN, magana was malposition now repositioned under US guidance. In ATN from shock.  - AMS, post arrest, more awake holding sedation  - Frequent vent changes, gas now acceptable.   - C/W zosyn for aspiration pna, f/u cultures  - F/U official TTE  - On going GOC, patient not highly functional at home, s/p cardiac arrest, in renal failure. Per son Joey and Vitor is now DNR. MOLST was filled out. Family meeting this after for possible palliative extubation    D/W CCU team during am rounds.

## 2022-10-27 NOTE — GOALS OF CARE CONVERSATION - ADVANCED CARE PLANNING - CONVERSATION DETAILS
Family meeting with Vitor Cook her two sons. Have been having ongoing GOC with the family. Patient has poor functional status at home, bedbound prior to her admission and cardiac arrest. After discussion with family about various treatment options, long term prognosis, they agreed to compassionate extubation with medications to help control her symptoms at the end of life. Does not want her to be in distress with extubation. Patient will now be comfort measures with IV pain medications. Will start Dilaudid gtt and prn pain medications, with prn ativan and Robinul. Family all at bedside to say good bye. Will extubation when meds arrive.

## 2022-10-27 NOTE — PROGRESS NOTE ADULT - SUBJECTIVE AND OBJECTIVE BOX
Interval Events: Patient was seen and examined at bedside. No overnight events.    More awake off less sedation. PS trials today.     REVIEW OF SYSTEMS:  Constitutional: [ ] fevers [ ] chills [ ] weight loss [ ] weight gain  CV: [ ] chest pain [ ] orthopnea [ ] palpitations [ ] murmur  Resp: [ ] cough [ ] shortness of breath [ ] dyspnea [ ] wheezing [ ] sputum [ ] hemoptysis  [ ] All other systems negative  [ x] Unable to assess ROS because intubated and sedated.   OBJECTIVE:  ICU Vital Signs Last 24 Hrs  T(C): 35.8 (27 Oct 2022 10:00), Max: 37.1 (27 Oct 2022 06:00)  T(F): 96.5 (27 Oct 2022 10:00), Max: 98.8 (27 Oct 2022 06:00)  HR: 93 (27 Oct 2022 13:00) (77 - 94)  BP: 101/53 (27 Oct 2022 13:00) (88/58 - 124/71)  BP(mean): 65 (27 Oct 2022 13:00) (61 - 99)  ABP: --  ABP(mean): --  RR: 26 (27 Oct 2022 13:00) (0 - 31)  SpO2: 96% (27 Oct 2022 13:00) (94% - 99%)      Mode: CPAP with PS, FiO2: 30, PEEP: 5, PS: 10    10-26 @ 07:01  -  10-27 @ 07:00  --------------------------------------------------------  IN: 815.5 mL / OUT: 517 mL / NET: 298.5 mL      CAPILLARY BLOOD GLUCOSE      POCT Blood Glucose.: 154 mg/dL (25 Oct 2022 18:49)      PHYSICAL EXAM:    Constitutional: well-developed; well-groomed; malnourished thin, frail appearing   Eyes: PERRL; EOMI  ENMT: Normal oropharnxy, no oral lesions, no erythema, no exudates  Neck:  Supple; no JVD; normal thyroid gland  Respiratory: airway patent; breath sounds equal; good air movement, no wheezing, no crackles, no rhonchi. no increase in WOB  Cardiovascular: regular rate and rhythm  no rub , no murmur, no gallops.   Gastrointestinal: soft; no distention, normal BS, no TTP, no organomegaly, no ascites.  Extremities: no clubbing; no cyanosis; no pedal edema, non-tender to palpation, DP and Radial pulses intact.  Neurological: alert and oriented x 0 intubated and sedated.   Skin: warm and dry; color normal: no rash: no ulcers  Psychiatric: Calm, no SI/HI        HOSPITAL MEDICATIONS:  MEDICATIONS  (STANDING):  dexMEDEtomidine Infusion 0.2 MICROgram(s)/kG/Hr (2.27 mL/Hr) IV Continuous <Continuous>  HYDROmorphone Infusion 1 mG/Hr (5 mL/Hr) IV Continuous <Continuous>    MEDICATIONS  (PRN):  glycopyrrolate Injectable 0.2 milliGRAM(s) IV Push every 8 hours PRN secreations  HYDROmorphone  Injectable 2 milliGRAM(s) IV Push every 2 hours PRN Comfort measures  LORazepam   Injectable 1 milliGRAM(s) IV Push every 4 hours PRN Agitation      LABS:                        11.8   9.32  )-----------( 167      ( 27 Oct 2022 06:14 )             36.1     Hgb Trend: 11.8<--, 11.5<--, 11.7<--, 12.3<--, 12.6<--  10    133<L>  |  101  |  52<H>  ----------------------------<  137<H>  4.7   |  23  |  2.26<H>    Ca    7.8<L>      27 Oct 2022 06:14  Phos  5.3     10-27  Mg     2.2     10-27    TPro  6.6  /  Alb  2.5<L>  /  TBili  0.6  /  DBili  x   /  AST  170<H>  /  ALT  90<H>  /  AlkPhos  114  10    Creatinine Trend: 2.26<--, 1.64<--, 1.33<--, 1.16<--, 0.70<--, 0.75<--    Urinalysis Basic - ( 26 Oct 2022 11:30 )    Color: Yellow / Appearance: Slightly Turbid / S.015 / pH: x  Gluc: x / Ketone: Trace  / Bili: Negative / Urobili: Negative   Blood: x / Protein: 100 / Nitrite: Negative   Leuk Esterase: Small / RBC: 6-10 /HPF / WBC 6-10   Sq Epi: x / Non Sq Epi: Few / Bacteria: Moderate      Arterial Blood Gas:  10-26 @ 06:25  7.41/20/219/13/99/-9.6  ABG lactate: --  Arterial Blood Gas:  10-25 @ 21:38  7.34/25/278/14/100/-10.5  ABG lactate: --  Arterial Blood Gas:  10-25 @ 20:40  7.18/55/45/20/58/-8.3  ABG lactate: --    Venous Blood Gas:  10-27 @ 06:14  7.46/31/179/22/99.8  VBG Lactate: --  Venous Blood Gas:  10-26 @ 14:22  7.47/31/208/23/99.9  VBG Lactate: --    MICROBIOLOGY:     RADIOLOGY & EKG:  [x ] Reviewed and interpreted by me

## 2022-10-27 NOTE — PROGRESS NOTE ADULT - NUTRITIONAL ASSESSMENT
This patient has been assessed with a concern for Malnutrition and has been determined to have a diagnosis/diagnoses of Severe protein-calorie malnutrition and Underweight (BMI < 19).    This patient is being managed with:   Diet NPO with Tube Feed-  Tube Feeding Modality: Orogastric  Nepro with Carb Steady (NEPRORTH)  Total Volume for 24 Hours (mL): 840  Continuous  Starting Tube Feed Rate {mL per Hour}: 10  Increase Tube Feed Rate by (mL): 10     Every 10 hours  Until Goal Tube Feed Rate (mL per Hour): 35  Tube Feed Duration (in Hours): 24  Tube Feed Start Time: 00:00  Free Water Flush  Free Water Flush Instructions:  free water flush as per critical care MD to maintain hydration  No Carb Prosource TF     Qty per Day:  2  Entered: Oct 26 2022  1:04PM    
This patient has been assessed with a concern for Malnutrition and has been determined to have a diagnosis/diagnoses of Severe protein-calorie malnutrition and Underweight (BMI < 19).    This patient is being managed with:   Diet NPO with Tube Feed-  Tube Feeding Modality: Orogastric  Nepro with Carb Steady (NEPRORTH)  Total Volume for 24 Hours (mL): 840  Continuous  Starting Tube Feed Rate {mL per Hour}: 10  Increase Tube Feed Rate by (mL): 10     Every 10 hours  Until Goal Tube Feed Rate (mL per Hour): 35  Tube Feed Duration (in Hours): 24  Tube Feed Start Time: 00:00  Free Water Flush  Free Water Flush Instructions:  free water flush as per critical care MD to maintain hydration  No Carb Prosource TF     Qty per Day:  2  Entered: Oct 26 2022  1:04PM    
This patient has been assessed with a concern for Malnutrition and has been determined to have a diagnosis/diagnoses of Severe protein-calorie malnutrition and Underweight (BMI < 19).    This patient is being managed with:   Diet NPO-  Entered: Oct 25 2022  7:43PM    
This patient has been assessed with a concern for Malnutrition and has been determined to have a diagnosis/diagnoses of Severe protein-calorie malnutrition and Underweight (BMI < 19).    This patient is being managed with:   Diet DASH/TLC-  Sodium & Cholesterol Restricted  Entered: Oct 25 2022  1:09AM    
This patient has been assessed with a concern for Malnutrition and has been determined to have a diagnosis/diagnoses of Severe protein-calorie malnutrition and Underweight (BMI < 19).

## 2022-10-31 DIAGNOSIS — J69.0 PNEUMONITIS DUE TO INHALATION OF FOOD AND VOMIT: ICD-10-CM

## 2022-10-31 DIAGNOSIS — Z88.2 ALLERGY STATUS TO SULFONAMIDES: ICD-10-CM

## 2022-10-31 DIAGNOSIS — J84.10 PULMONARY FIBROSIS, UNSPECIFIED: ICD-10-CM

## 2022-10-31 DIAGNOSIS — N32.81 OVERACTIVE BLADDER: ICD-10-CM

## 2022-10-31 DIAGNOSIS — Z87.891 PERSONAL HISTORY OF NICOTINE DEPENDENCE: ICD-10-CM

## 2022-10-31 DIAGNOSIS — K21.9 GASTRO-ESOPHAGEAL REFLUX DISEASE WITHOUT ESOPHAGITIS: ICD-10-CM

## 2022-10-31 DIAGNOSIS — R33.9 RETENTION OF URINE, UNSPECIFIED: ICD-10-CM

## 2022-10-31 DIAGNOSIS — I45.10 UNSPECIFIED RIGHT BUNDLE-BRANCH BLOCK: ICD-10-CM

## 2022-10-31 DIAGNOSIS — R57.0 CARDIOGENIC SHOCK: ICD-10-CM

## 2022-10-31 DIAGNOSIS — Z74.01 BED CONFINEMENT STATUS: ICD-10-CM

## 2022-10-31 DIAGNOSIS — Z66 DO NOT RESUSCITATE: ICD-10-CM

## 2022-10-31 DIAGNOSIS — R73.9 HYPERGLYCEMIA, UNSPECIFIED: ICD-10-CM

## 2022-10-31 DIAGNOSIS — Z51.5 ENCOUNTER FOR PALLIATIVE CARE: ICD-10-CM

## 2022-10-31 DIAGNOSIS — R00.1 BRADYCARDIA, UNSPECIFIED: ICD-10-CM

## 2022-10-31 DIAGNOSIS — E88.09 OTHER DISORDERS OF PLASMA-PROTEIN METABOLISM, NOT ELSEWHERE CLASSIFIED: ICD-10-CM

## 2022-10-31 DIAGNOSIS — I46.8 CARDIAC ARREST DUE TO OTHER UNDERLYING CONDITION: ICD-10-CM

## 2022-10-31 DIAGNOSIS — J43.9 EMPHYSEMA, UNSPECIFIED: ICD-10-CM

## 2022-10-31 DIAGNOSIS — J96.01 ACUTE RESPIRATORY FAILURE WITH HYPOXIA: ICD-10-CM

## 2022-10-31 DIAGNOSIS — E78.5 HYPERLIPIDEMIA, UNSPECIFIED: ICD-10-CM

## 2022-10-31 DIAGNOSIS — I11.0 HYPERTENSIVE HEART DISEASE WITH HEART FAILURE: ICD-10-CM

## 2022-10-31 DIAGNOSIS — I25.10 ATHEROSCLEROTIC HEART DISEASE OF NATIVE CORONARY ARTERY WITHOUT ANGINA PECTORIS: ICD-10-CM

## 2022-10-31 DIAGNOSIS — Z90.49 ACQUIRED ABSENCE OF OTHER SPECIFIED PARTS OF DIGESTIVE TRACT: ICD-10-CM

## 2022-10-31 DIAGNOSIS — Z86.718 PERSONAL HISTORY OF OTHER VENOUS THROMBOSIS AND EMBOLISM: ICD-10-CM

## 2022-10-31 DIAGNOSIS — E43 UNSPECIFIED SEVERE PROTEIN-CALORIE MALNUTRITION: ICD-10-CM

## 2022-10-31 DIAGNOSIS — E87.20 ACIDOSIS, UNSPECIFIED: ICD-10-CM

## 2022-10-31 DIAGNOSIS — J90 PLEURAL EFFUSION, NOT ELSEWHERE CLASSIFIED: ICD-10-CM

## 2022-10-31 DIAGNOSIS — Z79.82 LONG TERM (CURRENT) USE OF ASPIRIN: ICD-10-CM

## 2022-10-31 DIAGNOSIS — I27.20 PULMONARY HYPERTENSION, UNSPECIFIED: ICD-10-CM

## 2022-10-31 DIAGNOSIS — Z92.3 PERSONAL HISTORY OF IRRADIATION: ICD-10-CM

## 2022-10-31 DIAGNOSIS — I50.810 RIGHT HEART FAILURE, UNSPECIFIED: ICD-10-CM

## 2022-10-31 DIAGNOSIS — R62.7 ADULT FAILURE TO THRIVE: ICD-10-CM

## 2022-10-31 DIAGNOSIS — N17.0 ACUTE KIDNEY FAILURE WITH TUBULAR NECROSIS: ICD-10-CM

## 2022-10-31 DIAGNOSIS — Z85.118 PERSONAL HISTORY OF OTHER MALIGNANT NEOPLASM OF BRONCHUS AND LUNG: ICD-10-CM

## 2023-01-05 NOTE — ED STATDOCS - CROS ED ROS STATEMENT
"OBSTETRICS TRIAGE ASSESSMENT NOTE    Kena Walsh is a 16 year old  at 34w2d gestation based on 32w US who has presented to maternity care for further evaluation of abdominal pain.     Patient comes in with one hour of intermittent, sharp abdominal pain located in her lower abdomen with radiation up and to the left side. It has become progressively worse in the last twenty minutes. She has felt this before in her pregnancy, both a few days ago and in November. It occurs randomly, and does not seem to have inciting/alleviating factors. Denies recent fevers, nausea, vomiting, dysuria, diarrhea or constipation.    Patient denies bleeding with the exception of seeing \"maybe a drop\" when she was giving her urine sample. Denies abnormal discharge. No LOF. Baby is moving normally. Placenta posterior on most recent US.    [place] recently? No    Denies a history of UTIs prior to pregnancy or kidney stones. States she had a procedure done on her left side, unclear what procedure during history, per chart perhaps an appendectomy.    Per chart review, appears there has been difficulty obtaining prenatal records from prior to transfer of care. With the information available, prenatal course significant for:  - patient's US around 32 weeks revealed abnormalities of the ventricles. Patient saw [facility], work-up significant for ventriculometry. CMV IgG positive. Has an fetal MRI scheduled for .  - passed 1h GTT  - third trimester Hgb 12.6  - was seen at [facility] in November and treated for a UTI (culture >100k mixed perla)  - last clinic visit was treated empirically for UTI although unclear if medication was taken    PRENATAL CARE  Seen by Dr. Hawkins at [facility] (late transfer from [facility]).         PAST MEDICAL HISTORY  Past Medical History:   Diagnosis Date     Depressive disorder        PAST SURGICAL HISTORY   Past Surgical History:   Procedure Laterality Date     APPENDECTOMY   " "      MEDICATIONS    Current Facility-Administered Medications:      lidocaine (LMX4) cream, , Topical, Q1H PRN, Melly Soto MD     lidocaine 1 % 0.1-1 mL, 0.1-1 mL, Other, Q1H PRN, Melly Soto MD     sodium chloride (PF) 0.9% PF flush 3 mL, 3 mL, Intracatheter, Q8H, Melly Soto MD     sodium chloride (PF) 0.9% PF flush 3 mL, 3 mL, Intracatheter, q1 min prn, Melly Soto MD    SOCIAL HISTORY:   Social History     Socioeconomic History     Marital status: Single     Spouse name: Not on file     Number of children: Not on file     Years of education: Not on file     Highest education level: Not on file   Occupational History     Not on file   Tobacco Use     Smoking status: Never     Smokeless tobacco: Never   Substance and Sexual Activity     Alcohol use: Not on file     Drug use: Not on file     Sexual activity: Not on file   Other Topics Concern     Not on file   Social History Narrative     Not on file     Social Determinants of Health     Financial Resource Strain: Not on file   Food Insecurity: Not on file   Transportation Needs: Not on file   Physical Activity: Not on file   Stress: Not on file   Intimate Partner Violence: Not on file   Housing Stability: Not on file       PHYSICAL EXAMINATION   /74 (BP Location: Right arm, Patient Position: Semi-Zheng's, Cuff Size: Adult Regular)   Pulse 111   Temp 98.3  F (36.8  C) (Oral)   Resp 18   Ht 1.549 m (5' 1\")   Wt 76.2 kg (168 lb)   BMI 31.74 kg/m    Gen: appears comfortable, no acute distress  CV: regular rate and rhythm, no murmur appreciated  Lungs: clear to ausculation, good air movement throughout  Abdomen: Gravid, non-tender fundus, mild tenderness in left lower quadrant, no CVA tenderness  Extremities: no lower extremity edema  Cervix: unable to visualize due to patient ending exam  FHT: Category 1 tracing; moderate variability and accelerations, no decelerations  Contractions: none    LAB RESULTS  Personally " reviewed.  No results found for this or any previous visit (from the past 24 hour(s)).    ASSESSMENT:  Kena Walsh is a 16 year old year old at 34w2d weeks, presenting with one hour of intermittent LLQ pain, most likely c/w round ligament pain. Category 1 FHT throughout monitoring. Patient's pain improved while here.     Monitor without contractions (labor). UA not indicative of UTI and wet prep normal (infection). No bleeding noted (abruption). No history of kidney stones, UA w/o blood. Patient states that she has regular, soft bowel movements (constipation).    PLAN:    Reassurance given, consider pregnancy band    Tylenol prn for pain, max daily dose 3G    Will try to get into clinic tomorrow vs. early next week for follow-up    Return precautions given    Lyndsey Loza MD  Family Medicine PGY-2    Precepted patient with Dr. Melly Soto who agrees with the plan above.    12:30 addendum:  Patient had an episode of emesis earlier while on the unit. NBNB. When asked about nausea/vomiting, patient does endorse a few episodes of emesis in the last few days despite denying earlier. Otherwise, denies fevers, changes in bowels, sick contacts. Appears non-toxic with pain improving.  - 1L bolus NS  - CBC/BMP    1:04 AM  Labs came back fairly normal. Hgb slightly down at 11.5. Electrolytes normal. No other episodes of emesis while here. Likely viral etiology vs. related to food intake.  - ok for discharge  - follow-up in clinic ether tomorrow/early next week (message sent)      Attestation:  This patient has been discussed with me, Melly Soto MD on 1/5/2023.  I saw and discussed the case with the resident and the care team. I agree with the findings and plan in this note. I have reviewed today's vital signs, medications, laboratory results and fetal monitor tracing.    Melly Soto MD      all other ROS negative except as per HPI

## 2023-01-15 NOTE — ED PROVIDER NOTE - CPE EDP SKIN NORM
[Current] : Current [5-9] : 5-9 [Yes] : Yes [2 - 4 times a month (2 pts)] : 2-4 times a month (2 points) [1 or 2 (0 pts)] : 1 or 2 (0 points) [Never (0 pts)] : Never (0 points) [No falls in past year] : Patient reported no falls in the past year [HIV test declined] : HIV test declined [Hepatitis C test declined] : Hepatitis C test declined [None] : None [Audit-CScore] : 2 [de-identified] : step [de-identified] : reg [Change in mental status noted] : No change in mental status noted [Language] : denies difficulty with language [Behavior] : denies difficulty with behavior [Learning/Retaining New Information] : denies difficulty learning/retaining new information [Handling Complex Tasks] : denies difficulty handling complex tasks [Reasoning] : denies difficulty with reasoning [Spatial Ability and Orientation] : denies difficulty with spatial ability and orientation normal...

## 2023-03-27 NOTE — DISCHARGE NOTE FOR THE EXPIRED PATIENT - TIME PATIENT WAS PRONOUNCED DEAD
27-Oct-2022 14:00 For pain, take Tylenol and Motrin OTC as needed  Keep dressings in place, will be removed in office  No strenous activity  Followup with Dr. Venegas in office on Tuesday 4/4/23

## 2023-03-30 NOTE — ED ADULT TRIAGE NOTE - NSWEIGHTCALCTOOLDRUG_GEN_A_CORE
HR=96 bpm, GMEO=562/93 mmhg, SpO2=97.0 %, Resp=19 B/min, EtCO2=38 mmHg, Apnea=1 Seconds, Pain=0, Phillips=2  used

## 2023-06-23 NOTE — PATIENT PROFILE ADULT - NSPRONUTRITIONRISK_GEN_A_NUR
[facility] [phone]    [facility] VIDEO VISIT PROGRESS NOTE    CHIEF COMPLAINT  Chief Complaint   Patient presents with   • URI   • Video Visit   • Video Visit       SUBJECTIVE  HISTORY OF PRESENT ILLNESS:   Rachael Harrison is a 34 year old female who consents to a two-way Video Visit (V-Visit) evaluation for upper respiratory illness concerns.    Rachael reports the following symptoms: congestion, sinus pain or pressure, post nasal drip and dry cough Symptom onset: 6 days.    COVID-19 vaccine status: The patient has been vaccinated against COVID-19.   The patient has been tested for COVID-19 for this illness. She reports having a negative COVID-19 test on day 2 of symptoms. She denies any known COVID-19 exposures.   The patient has tried Mucinex and Tylenol for their current symptoms, which has been somewhat effective.  The patient denies ill contacts.     Denies chest pain, shortness of breath, wheezing, nausea, vomiting, diarrhea, ear pain, headache, dizziness, body aches, fatigue, palpitations, uncontrolled fever, inability to tolerate fluids, abdominal pain. She is 37 weeks gestation. She reports having iron deficiency anemia. She denies having any other pregnancy complications. She admits her symptoms feel similar to when she had a sinus infection in the past.     ALLERGIES  ALLERGIES:  No Known Allergies     MEDICATIONS  Current Outpatient Medications   Medication Sig   • famotidine (PEPCID) 20 MG tablet Take 1 tablet by mouth 2 times daily as needed.   • [START ON 6/26/2023] amoxicillin (AMOXIL) 875 MG tablet Take 1 tablet by mouth in the morning and 1 tablet in the evening. Do all this for 7 days. Do not start before June 26, 2023.   • Prenatal-FeFum-FA-DHA w/o A (PRENATAL + DHA) 27-1 & 250 MG Therapy Pack Take 1 tablet by mouth daily.     No current facility-administered medications for this visit.        OBJECTIVE  PHYSICAL EXAM:   Information acquired with patient assistance,  demonstration, and feedback due to two-way video visit method of visit. Portions of assessment may be difficult to visualize precisely given nature of technology and limitations of assessment with virtual platform.     GENERAL: Awake, alert, oriented and in no acute distress.    SKIN: Appears pink and dry.     HENT: Normocephalic, atraumatic. Pupils are equal, round. Conjunctivae are not injected.     Mouth/Throat: Lips appear moist.    RESPIRATORY:  Breathing effort is normal. Able to speak in full sentences. No evidence of respiratory distress.    PSYCHIATRIC: The patient was able to demonstrate good judgement and reason.    ASSESSMENT/PLAN   Acute URI  - amoxicillin (AMOXIL) 875 MG tablet; Take 1 tablet by mouth in the morning and 1 tablet in the evening. Do all this for 7 days. Do not start before June 26, 2023.  Dispense: 14 tablet; Refill: 0    Acute cough    Mucinex  Saline nasal spray as needed to moisten nostrils  Tylenol every 4-6 hours as needed for pain/fevers  Nasal sinus rinse 1-2 times per day  Warm compresses over the sinus area  Steam inhalation  Rest and push fluids    After evaluating the patient, the presentation seems to be suggestive of an upper respiratory illness, including but not limited to the common cold, viral sinusitis, strep throat, influenza, or COVID-19. Symptomatic management recommended and included in patient instructions.     Informed patient that it is recommended to take a total of two home COVID-19, 48 hours apart, with a negative result in order to return to regular activities. Patient will complete testing with home kit and follow guidance based on result. They were educated on the diagnosis of suspected COVID-19 virus infection    Discussed guidelines for antibiotic indication of which they do not meet.  Encouraged symptomatic control as outlined above.  I will prescribe an antibiotic with a future start date if her symptoms worsen by day 9-10. Patient was advised to  continue supportive treatments as listed above and if worsening by day 9-10 start amoxicillin.     If antibiotic was prescribed, consumption of antibiotic advised until completion, despite clinical improvement.   Reviewed the guidelines published in 2015 by the American Academy of Otolaryngology-Head and Neck Surgery which constitutes the following:  \"Acute rhinosinusitis that is caused by, or is presumed to be by bacterial infection.  A clinician should diagnose ABRS when:  Symptoms or signs of acute rhinosinusitis fail to improve within 10 days or more beyond onset of upper respiratory symptoms OR symptoms or signs of acute rhinosinusitis worsen within 10 days after an initial improvement (double worsening).\"    FOLLOW-UP   Return for As needed .  If symptoms are no better after 10 days, they can return to [facility] for a follow up visit and discussion for further treatment. If symptoms worsen, they should be seen in Urgent Care, Immediate Care, or the Emergency Department.      PATIENT INSTRUCTIONS  Attached in After Visit Summary  The patient verbalizes understanding of the diagnosis and plan of care. There were no further questions or concerns.   They were advised to contact the [facility] RN with any questions at [phone].    CONSENT  Patient consent was obtained for this Video Visit using the [employer].   Clinician Location: [facility] Visit- Home office.   Patient is at home, in the [facility] at the time of this visit.    LESLI Campbell  6/23/2023  12:38 PM     No indicators present

## 2023-07-14 NOTE — ED PROVIDER NOTE - INCIDENT LOCATION
Pt observed from video monitoring bringing plastic dinner tray into room. Pt then observed squatting on the bed, appearing to rock bed back and forth, then standing on bed. Psych associate staff notified.    doctors office

## 2024-04-23 NOTE — ED ADULT NURSE NOTE - PRO INTERPRETER NEED 2
Nursing Home:  [facility]    The patient is being seen today for follow-up after recent admission to this facility for:  Chief Complaint   Patient presents with    Follow-Up from Hospital         SUBJECTIVE:  Patient being seen today for follow-up after admission to this facility from the hospital with primary diagnoses of generalized weakness, frequent falls, COPD, HTN, alcohol-induced neuropathy, hyperthyroidism, malnutrition, and gastric ulcer.    FAMILY AND SOCIAL HISTORY:  Refer to H&P.   Past Medical History:   Diagnosis Date    Arthritis     Hyperlipidemia     Hypertension     Thyroid disease      No family history on file.  Social History     Socioeconomic History    Marital status:      Spouse name: Not on file    Number of children: Not on file    Years of education: Not on file    Highest education level: Not on file   Occupational History    Not on file   Tobacco Use    Smoking status: Every Day    Smokeless tobacco: Never   Substance and Sexual Activity    Alcohol use: Yes    Drug use: Not on file    Sexual activity: Not on file   Other Topics Concern    Not on file   Social History Narrative    Not on file     Social Determinants of Health     Financial Resource Strain: Not on file   Food Insecurity: Not on file   Transportation Needs: Not on file   Physical Activity: Not on file   Stress: Not on file   Social Connections: Not on file   Intimate Partner Violence: Not on file   Housing Stability: Not on file     Allergies:  Patient has no known allergies.    MEDICATIONS:    Current Outpatient Medications on File Prior to Visit   Medication Sig Dispense Refill    levothyroxine (SYNTHROID) 25 MCG tablet TAKE ONE TABLET BY MOUTH EVERY DAY (Patient not taking: Reported on 4/4/2024)  5    vitamin B-1 (THIAMINE) 100 MG tablet Take 1 tablet by mouth daily       No current facility-administered medications on file prior to visit.         REVIEW OF SYSTEMS:  Resident denies any 
English

## 2024-05-21 NOTE — PATIENT PROFILE ADULT - NSASFUNCLEVELADLTOILET_GEN_A_NUR
"Chronic Pain -- Established Patient (Follow-up visit)    Referring Physician: self    PCP: Erica Reilly MD      Chief Complaint:   Chief Complaint   Patient presents with    Low-back Pain   Low back pain, right groin pain    Interval History (5/21/2024):  Patient presents today for follow-up visit.  Patient was last seen 3 months ago. At that visit, the plan was to continue gabapentin from Dr. Solomon, but patient has not been taking.  She is here today complaining of severe back pain.  She is using a cane today.  She reports that she went to the ER on 05/15/2024 and was given pain medication-met 500mg", which we believed to be Robaxin 500 mg.  She reports this medication is not helping.  She is also taking this along with Tylenol and Omar's OTC with limited pain relief.  She reports she has not been taking the gabapentin lately.  She is very scared of injections and does not want to move forward with this.  She reports that she has a physical therapy order to start on May 28th, but she would like to start sooner.  She states that she can not work like this and is asking for some immediate pain relief.  She inquires about 3 disc messed up in his wondering what she needs to do about this.  She has not currently taking any NSAIDs over-the-counter.  Patient reports pain as 10/10 today.    Initial HPI (2/19/2024):  Brissa Lopez is a 58 y.o. female with past medical history significant for hirsutism, uterine fibroids, type 2 diabetes, obesity who presents to the clinic for the evaluation of diffuse pain including pain in her abdomen, breasts ," lower back and legs.  Today she reports pain in the lower back which radiates down the lateral and posterior aspects of bilateral lower extremities in L4-S1 distribution to the feet.  Pain is intermittent and today is rated a 10/10.  Patient reports her pain has been present for 1.5 years without inciting accident injury or trauma.  Pain is described as sharp, " stabbing, aching, burning in nature.  Pain is exacerbated with prolonged standing or ambulation exceeding 10-15 minutes.  She does endorse associated weakness in the lower extremities associated with her pain.  Pain is improved with sitting.  Patient is currently taking gabapentin 300 mg twice daily per her PCP, Dr. Reilly but does not report noticeable improvement on this dosage.  She has not received prior interventional treatment.  Patient reports completing twice weekly sessions of physical therapy for 6 months, approximately 5 months prior at [facility].  Patient reports significant motor weakness and loss of sensations.  Patient denies night fever/night sweats, urinary incontinence, bowel incontinence, and significant weight loss.      Pain Disability Index Review:      5/21/2024     8:15 AM 2/19/2024    10:35 AM   Last 3 PDI Scores   Pain Disability Index (PDI) 47 70       Non-Pharmacologic Treatments:  Physical Therapy/Home Exercise: yes  Ice/Heat:yes  TENS: no  Acupuncture: no  Massage: no  Chiropractic: no    Other: no      Pain Medications:  - Adjuvant Medications: Neurontin (Gabapentin), Celebrex    Pain Procedures:   None               Review of Systems:  GENERAL:  No weight loss, malaise or fevers.  HEENT:   No recent changes in vision or hearing  NECK:  Negative for lumps, no difficulty with swallowing.  RESPIRATORY:  Negative for cough, wheezing or shortness of breath, patient denies any recent URI.  CARDIOVASCULAR:  Negative for chest pain or palpitations.  GI:  Negative for abdominal discomfort, blood in stools or black stools or change in bowel habits.  MUSCULOSKELETAL:  See HPI.  SKIN:  Negative for lesions, rash, and itching.  PSYCH:  No mood disorder or recent psychosocial stressors.   HEMATOLOGY/LYMPHOLOGY:  Negative for prolonged bleeding, bruising easily or swollen nodes.    NEURO:   No history of syncope, paralysis, seizures or tremors.  All other reviewed and negative other than  "HPI.        OBJECTIVE:  Physical Exam:  Vitals:    05/21/24 0814   BP: 131/74   Pulse: 94   Weight: 80 kg (176 lb 5.9 oz)   Height: 5' 5" (1.651 m)   PainSc: 10-Worst pain ever   PainLoc: Back    Body mass index is 29.35 kg/m².   (reviewed on 5/22/2024)    GENERAL: Well appearing, in no acute distress, alert and oriented x3.  PSYCH:  Mood and affect appropriate.  SKIN: Skin color, texture, turgor normal, no rashes or lesions.  HEAD/FACE:  Normocephalic, atraumatic. Cranial nerves grossly intact.    PULM: No evidence of respiratory difficulty, symmetric chest rise.  GI:  Soft and non-tender.    BACK: Straight leg raising in the sitting and supine positions is negative to radicular pain.  pain to palpation over the facet joints of the lumbar spine or spinous processes. Normal range of motion without pain reproduction.  EXTREMITIES: Peripheral joint ROM is full and pain free without obvious instability or laxity in all four extremities. No deformities, edema, or skin discoloration. Good capillary refill.  MUSCULOSKELETAL: Able to stand on heels & toes.   Shoulder, hip, and knee provocative maneuvers are negative.  There is no pain with palpation over the sacroiliac joints bilaterally.    Facet loading test is positive bilaterally.   Bilateral upper and lower extremity strength is normal and symmetric.  No atrophy or tone abnormalities are noted.    RIGHT Lower extremity: Hip flexion 5/5, Hip Abduction 5/5, Hip Adduction 5/5, Knee extension 5/5, Knee flexion 5/5, Ankle dorsiflexion5/5, Extensor hallucis longus 5/5, Ankle plantarflexion 5/5  LEFT Lower extremity:  Hip flexion 5/5, Hip Abduction 5/5,Hip Adduction 5/5, Knee extension 5/5, Knee flexion 5/5, Ankle dorsiflexion 5/5, Extensor hallucis longus 5/5, Ankle plantarflexion 5/5  -Normal testing knee (patellar) jerk and ankle (achilles) jerk    NEURO: Bilateral upper and lower extremity coordination and muscle stretch reflexes are physiologic and symmetric. No loss of " sensation is noted.  GAIT:  Antalgic gait.  Moving slowly, with cane.      Imaging (Reviewed on 5/22/2024):    X-ray right hip 06/17/2023  Discussion:  There is no fracture or malalignment. The soft tissues are unremarkable.   There are vascular calcifications.     MRI sacroiliac joints 12/20/2023  FINDINGS: The sacroiliac joints are normal and bilaterally symmetric. Normal bone marrow signal. Normal uniform SI joint width. No joint effusion. No erosions. No sclerosis. No spurring. No periarticular soft tissue swelling. No abnormal postcontrast enhancement. No bone lesion.         ASSESSMENT: 58 y.o. year old female with     1. Lumbar spondylosis  meloxicam (MOBIC) 7.5 MG tablet    Ambulatory referral/consult to Physical/Occupational Therapy      2. Lumbar facet joint pain  Ambulatory referral/consult to Physical/Occupational Therapy      3. Right groin pain  Ambulatory referral/consult to Physical/Occupational Therapy      4. Acute exacerbation of chronic low back pain  ketorolac injection 30 mg    methylPREDNISolone (MEDROL DOSEPACK) 4 mg tablet            PLAN:   - Interventions:   - Procedure note: An IM injection of (ketolorac 30mg/1mL) was administered during clinic visit by our medical assistant.  This was well tolerated.   - Previously noted: We discussed at length consideration of bilateral L3-5 lumbar medial branch block for axial back pain, bilateral greater trochanteric bursa injection for bursitis, versus lumbar epidural for radiculopathy. Explained the risks and benefits of the procedure in detail with the patient today in clinic along with alternative treatment options, and the patient deferred the intervention at this time.  Patient is adamantly against interventional treatment.    - Anticoagulation use: No no anticoagulation    - Medications:  -We previously discussed increasing gabapentin medication to a more therapeutic goal.  We discussed the mechanism of action of this medication along with  potential side effects and potential pain relief.  Patient would like to continue pharmacologic management per Dr. Reilly.  Patient was encouraged to start taking gabapentin more regularly.  - Start Mobic 7.5mg BID PRN pain. she denies any kidney or cardiac issues. Take with food.  Avoid other OTC NSAIDs (ex. Ibuprofen, naproxen) while taking this medication.   - Continue OTC Tylenol (acetaminophen) 500mg.  - Will prescribe Medrol Dose pack with instructions - for acute pain flare:  Day 1: 2 tablets before breakfast, 1 tablet after lunch, 1 tablet after dinner, 2 tablets at bedtime   Day 2: 1 tablet before breakfast, 1 tablet after lunch, 1 tablet after dinner, 2 tablets at bedtime   Day 3: 1 tablet before breakfast, 1 tablet after lunch, 1 tablet after dinner, 1 tablet at bedtime   Day 4: 1 tablet before breakfast, 1 tablet after lunch, 1 tablet at bedtime   Day 5: 1 tablet before breakfast, 1 tablet at bedtime   Day 6: 1 tablet before breakfast.      report:  Reviewed and consistent with medication use as prescribed.      - Therapy:   We discussed initiating aquatic physical therapy to help manage the patient/s painful condition. The patient was counseled that muscle strengthening will improve the long term prognosis in regards to pain and may also help increase range of motion and mobility. They were told that one of the goals of physical therapy is that they learn how to do the exercises so that they can do them independently at home daily upon completion.  Patient has initial evaluation on May 28th, and we will attempt to move this date up to a sooner date for her.    - Imaging: We previously discussed considering a lumbar x-ray and lumbar MRI to better evaluate pain, lumbar radiculopathy and weakness.  Patient deferred at this time.    - Other:  Work excuse given for patient today.    - Follow up visit: PRN    - Patient Questions: Answered all of the patient's questions regarding diagnosis, therapy, and  treatment.    - This condition does not require this patient to take time off of work, and the primary goal of our Pain Management services is to improve the patient's functional capacity.   - I discussed the risks, benefits, and alternatives to potential treatment options. All questions and concerns were fully addressed today in clinic.         Mojgan Blkae PA-C  Interventional Pain Management - [facility]    Disclaimer:  This note was prepared using voice recognition system and is likely to have sound alike errors that may have been overlooked even after proof reading.  Please call me with any questions.    0 = independent

## 2024-11-20 NOTE — ED ADULT TRIAGE NOTE - CADM TRG TX PRIOR TO ARRIVAL
Pulse: 74   Resp: 18   Temp: 99.1 °F (37.3 °C)   SpO2: 100%     General Appearance: alert ,conversing, in no acute distress, up in chair at bedside.  Cardiovascular: normal rate, regular rhythm, normal S1 and S2, no murmurs, rubs, clicks, or gallops  Pulmonary/Chest: clear to auscultation bilaterally- no wheezes, rales or rhonchi, normal air movement, no respiratory distress, RA  Neurological: alert, oriented, normal speech, no focal findings or movement disorder noted.   Pulses: Bilateral radial, femoral, DP, and PT pulses noted  Lower Extremity: No edema noted. Groin incisions healing appropriately-No signs of infection or hematoma.    Discharge Medications:         Medication List        CONTINUE taking these medications      amiodarone 200 MG tablet  Commonly known as: CORDARONE  Take 1 tablet by mouth daily     calcium carbonate 500 MG Tabs tablet  Commonly known as: OSCAL     ferrous sulfate 325 (65 Fe) MG tablet  Commonly known as: IRON 325     furosemide 20 MG tablet  Commonly known as: LASIX  Take 1 tablet by mouth daily     levothyroxine 75 MCG tablet  Commonly known as: SYNTHROID     metoprolol tartrate 25 MG tablet  Commonly known as: LOPRESSOR  Take 1 tablet by mouth 2 times daily     midodrine 5 MG tablet  Commonly known as: PROAMATINE  Take 1 tablet by mouth 3 times daily     rivaroxaban 15 MG Tabs tablet  Commonly known as: Xarelto  Take 1 tablet by mouth daily (with breakfast)     therapeutic multivitamin-minerals tablet     Ventolin  (90 Base) MCG/ACT inhaler  Generic drug: albuterol sulfate HFA              Antibiotic prophylaxis (amoxicillin 2 g once 60 minutes prior to procedure) for life months for:  Any dental procedures including cleanings  2.   Gastrointestinal (GI) or genitourinary () procedures in patients with ongoing GI or  tract infection  3.   Respiratory procedures involving incision or biopsy  4.   Procedures on infected skin or muscle        Follow Up Plan  1. Follow up 
see ambulance record

## 2025-04-02 NOTE — ED STATDOCS - NS_EDPROVIDERDISPOUSERTYPE_ED_A_ED
----- Message from Flavia BAE sent at 4/1/2025  2:15 PM EDT -----  Regarding: Care Gap Request  04/01/25 2:15 PM    Hello, our patient has had CRC: Colonoscopy completed/performed. Please assist in updating the patient chart by pulling the Care Everywhere (CE) document. The date of service is 1/5/2021.     Thank you,  Flavia Varghese  [facility] INTERNAL MED   Scribe Attestation (For Scribes USE Only)... Scribe Attestation (For Scribes USE Only).../Attending Attestation (For Attendings USE Only)...